# Patient Record
Sex: MALE | Race: WHITE | Employment: OTHER | URBAN - METROPOLITAN AREA
[De-identification: names, ages, dates, MRNs, and addresses within clinical notes are randomized per-mention and may not be internally consistent; named-entity substitution may affect disease eponyms.]

---

## 2017-01-11 ENCOUNTER — CARDPULM VISIT (OUTPATIENT)
Dept: CARDIAC REHAB | Facility: HOSPITAL | Age: 77
End: 2017-01-11
Attending: INTERNAL MEDICINE
Payer: MEDICARE

## 2017-01-13 ENCOUNTER — CARDPULM VISIT (OUTPATIENT)
Dept: CARDIAC REHAB | Facility: HOSPITAL | Age: 77
End: 2017-01-13
Attending: INTERNAL MEDICINE
Payer: MEDICARE

## 2017-01-16 ENCOUNTER — CARDPULM VISIT (OUTPATIENT)
Dept: CARDIAC REHAB | Facility: HOSPITAL | Age: 77
End: 2017-01-16
Attending: INTERNAL MEDICINE
Payer: MEDICARE

## 2017-01-17 ENCOUNTER — TELEPHONE (OUTPATIENT)
Dept: FAMILY MEDICINE CLINIC | Facility: CLINIC | Age: 77
End: 2017-01-17

## 2017-01-17 DIAGNOSIS — I10 ESSENTIAL HYPERTENSION WITH GOAL BLOOD PRESSURE LESS THAN 130/85: ICD-10-CM

## 2017-01-17 DIAGNOSIS — E11.8 CONTROLLED TYPE 2 DIABETES MELLITUS WITH COMPLICATION, WITHOUT LONG-TERM CURRENT USE OF INSULIN (HCC): Primary | ICD-10-CM

## 2017-01-17 RX ORDER — GABAPENTIN 300 MG/1
CAPSULE ORAL
Qty: 360 CAPSULE | Refills: 0 | Status: SHIPPED | OUTPATIENT
Start: 2017-01-17 | End: 2017-02-27

## 2017-01-17 RX ORDER — POTASSIUM CHLORIDE 20 MEQ/1
TABLET, EXTENDED RELEASE ORAL
Qty: 90 TABLET | Refills: 0 | Status: ON HOLD | OUTPATIENT
Start: 2017-01-17 | End: 2017-04-28

## 2017-01-17 NOTE — TELEPHONE ENCOUNTER
Pt needs refill of gabapentin 300mg, Potassium Chloride ER 20 MEQ Oral Tab CR, SITagliptin Phosphate (JANUVIA) 100 MG Oral. Sent to his [facility] 90 day supply pharmacy fax [phone].

## 2017-01-18 ENCOUNTER — CARDPULM VISIT (OUTPATIENT)
Dept: CARDIAC REHAB | Facility: HOSPITAL | Age: 77
End: 2017-01-18
Attending: INTERNAL MEDICINE
Payer: MEDICARE

## 2017-01-20 ENCOUNTER — CARDPULM VISIT (OUTPATIENT)
Dept: CARDIAC REHAB | Facility: HOSPITAL | Age: 77
End: 2017-01-20
Attending: INTERNAL MEDICINE
Payer: MEDICARE

## 2017-01-23 ENCOUNTER — CARDPULM VISIT (OUTPATIENT)
Dept: CARDIAC REHAB | Facility: HOSPITAL | Age: 77
End: 2017-01-23
Attending: INTERNAL MEDICINE
Payer: MEDICARE

## 2017-01-23 ENCOUNTER — PRIOR ORIGINAL RECORDS (OUTPATIENT)
Dept: OTHER | Age: 77
End: 2017-01-23

## 2017-01-25 ENCOUNTER — CARDPULM VISIT (OUTPATIENT)
Dept: CARDIAC REHAB | Facility: HOSPITAL | Age: 77
End: 2017-01-25
Attending: INTERNAL MEDICINE
Payer: MEDICARE

## 2017-01-27 ENCOUNTER — CARDPULM VISIT (OUTPATIENT)
Dept: CARDIAC REHAB | Facility: HOSPITAL | Age: 77
End: 2017-01-27
Attending: INTERNAL MEDICINE
Payer: MEDICARE

## 2017-01-30 ENCOUNTER — CARDPULM VISIT (OUTPATIENT)
Dept: CARDIAC REHAB | Facility: HOSPITAL | Age: 77
End: 2017-01-30
Attending: INTERNAL MEDICINE
Payer: MEDICARE

## 2017-02-01 ENCOUNTER — CARDPULM VISIT (OUTPATIENT)
Dept: CARDIAC REHAB | Facility: HOSPITAL | Age: 77
End: 2017-02-01
Attending: INTERNAL MEDICINE
Payer: MEDICARE

## 2017-02-03 ENCOUNTER — CARDPULM VISIT (OUTPATIENT)
Dept: CARDIAC REHAB | Facility: HOSPITAL | Age: 77
End: 2017-02-03
Attending: INTERNAL MEDICINE
Payer: MEDICARE

## 2017-02-06 ENCOUNTER — CARDPULM VISIT (OUTPATIENT)
Dept: CARDIAC REHAB | Facility: HOSPITAL | Age: 77
End: 2017-02-06
Attending: INTERNAL MEDICINE
Payer: MEDICARE

## 2017-02-08 ENCOUNTER — CARDPULM VISIT (OUTPATIENT)
Dept: CARDIAC REHAB | Facility: HOSPITAL | Age: 77
End: 2017-02-08
Attending: INTERNAL MEDICINE
Payer: MEDICARE

## 2017-02-10 ENCOUNTER — CARDPULM VISIT (OUTPATIENT)
Dept: CARDIAC REHAB | Facility: HOSPITAL | Age: 77
End: 2017-02-10
Attending: INTERNAL MEDICINE
Payer: MEDICARE

## 2017-02-13 PROBLEM — H90.3 SENSORY HEARING LOSS, BILATERAL: Status: ACTIVE | Noted: 2017-02-13

## 2017-02-15 ENCOUNTER — CARDPULM VISIT (OUTPATIENT)
Dept: CARDIAC REHAB | Facility: HOSPITAL | Age: 77
End: 2017-02-15
Attending: INTERNAL MEDICINE
Payer: MEDICARE

## 2017-02-21 ENCOUNTER — HOSPITAL ENCOUNTER (OUTPATIENT)
Dept: GENERAL RADIOLOGY | Facility: HOSPITAL | Age: 77
Discharge: HOME | End: 2017-02-21
Attending: INTERNAL MEDICINE
Payer: MEDICARE

## 2017-02-21 ENCOUNTER — LAB ENCOUNTER (OUTPATIENT)
Dept: LAB | Facility: HOSPITAL | Age: 77
End: 2017-02-21
Attending: INTERNAL MEDICINE
Payer: MEDICARE

## 2017-02-21 ENCOUNTER — MYAURORA ACCOUNT LINK (OUTPATIENT)
Dept: OTHER | Age: 77
End: 2017-02-21

## 2017-02-21 ENCOUNTER — PRIOR ORIGINAL RECORDS (OUTPATIENT)
Dept: OTHER | Age: 77
End: 2017-02-21

## 2017-02-21 DIAGNOSIS — R06.00 DYSPNEA: ICD-10-CM

## 2017-02-21 DIAGNOSIS — R06.03 ACUTE RESPIRATORY DISTRESS: ICD-10-CM

## 2017-02-21 DIAGNOSIS — R60.9 EDEMA: Primary | ICD-10-CM

## 2017-02-21 DIAGNOSIS — R60.9 EDEMA: ICD-10-CM

## 2017-02-21 LAB
BASOPHILS # BLD AUTO: 0.06 X10(3) UL (ref 0–0.1)
BASOPHILS NFR BLD AUTO: 1.2 %
BUN BLD-MCNC: 25 MG/DL (ref 8–20)
CALCIUM BLD-MCNC: 9.8 MG/DL (ref 8.3–10.3)
CHLORIDE: 100 MMOL/L (ref 101–111)
CO2: 33 MMOL/L (ref 22–32)
CREAT BLD-MCNC: 1.25 MG/DL (ref 0.7–1.3)
EOSINOPHIL # BLD AUTO: 0.21 X10(3) UL (ref 0–0.3)
EOSINOPHIL NFR BLD AUTO: 4.2 %
ERYTHROCYTE [DISTWIDTH] IN BLOOD BY AUTOMATED COUNT: 15.9 % (ref 11.5–16)
GLUCOSE BLD-MCNC: 99 MG/DL (ref 70–99)
HCT VFR BLD AUTO: 40.3 % (ref 37–53)
HGB BLD-MCNC: 12.7 G/DL (ref 13–17)
IMMATURE GRANULOCYTE COUNT: 0.02 X10(3) UL (ref 0–1)
IMMATURE GRANULOCYTE RATIO %: 0.4 %
LYMPHOCYTES # BLD AUTO: 0.64 X10(3) UL (ref 0.9–4)
LYMPHOCYTES NFR BLD AUTO: 12.7 %
MCH RBC QN AUTO: 27.8 PG (ref 27–33.2)
MCHC RBC AUTO-ENTMCNC: 31.5 G/DL (ref 31–37)
MCV RBC AUTO: 88.2 FL (ref 80–99)
MONOCYTES # BLD AUTO: 0.47 X10(3) UL (ref 0.1–0.6)
MONOCYTES NFR BLD AUTO: 9.3 %
NEUTROPHIL ABS PRELIM: 3.64 X10 (3) UL (ref 1.3–6.7)
NEUTROPHILS # BLD AUTO: 3.64 X10(3) UL (ref 1.3–6.7)
NEUTROPHILS NFR BLD AUTO: 72.2 %
PLATELET # BLD AUTO: 152 10(3)UL (ref 150–450)
POTASSIUM SERPL-SCNC: 4.2 MMOL/L (ref 3.6–5.1)
RBC # BLD AUTO: 4.57 X10(6)UL (ref 3.8–5.8)
RED CELL DISTRIBUTION WIDTH-SD: 50.5 FL (ref 35.1–46.3)
SODIUM SERPL-SCNC: 140 MMOL/L (ref 136–144)
WBC # BLD AUTO: 5 X10(3) UL (ref 4–13)

## 2017-02-21 PROCEDURE — 36415 COLL VENOUS BLD VENIPUNCTURE: CPT

## 2017-02-21 PROCEDURE — 71035 XR CHEST DECUBITUS (CPT=71035): CPT

## 2017-02-21 PROCEDURE — 71020 XR CHEST PA + LAT CHEST (CPT=71020): CPT

## 2017-02-21 PROCEDURE — 80048 BASIC METABOLIC PNL TOTAL CA: CPT

## 2017-02-21 PROCEDURE — 85025 COMPLETE CBC W/AUTO DIFF WBC: CPT

## 2017-02-23 ENCOUNTER — APPOINTMENT (OUTPATIENT)
Dept: LAB | Age: 77
End: 2017-02-23
Attending: FAMILY MEDICINE
Payer: MEDICARE

## 2017-02-23 ENCOUNTER — NURSE ONLY (OUTPATIENT)
Dept: CARDIAC REHAB | Facility: HOSPITAL | Age: 77
End: 2017-02-23
Attending: FAMILY MEDICINE

## 2017-02-23 DIAGNOSIS — I10 ESSENTIAL HYPERTENSION WITH GOAL BLOOD PRESSURE LESS THAN 130/85: ICD-10-CM

## 2017-02-23 DIAGNOSIS — E11.42 DIABETIC POLYNEUROPATHY ASSOCIATED WITH TYPE 2 DIABETES MELLITUS (HCC): ICD-10-CM

## 2017-02-23 DIAGNOSIS — E11.8 CONTROLLED TYPE 2 DIABETES MELLITUS WITH COMPLICATION, WITHOUT LONG-TERM CURRENT USE OF INSULIN (HCC): ICD-10-CM

## 2017-02-23 DIAGNOSIS — E78.00 PURE HYPERCHOLESTEROLEMIA: ICD-10-CM

## 2017-02-23 LAB
ALBUMIN SERPL-MCNC: 3.2 G/DL (ref 3.5–4.8)
ALP LIVER SERPL-CCNC: 146 U/L (ref 45–117)
ALT SERPL-CCNC: 14 U/L (ref 17–63)
AST SERPL-CCNC: 14 U/L (ref 15–41)
BILIRUB SERPL-MCNC: 0.7 MG/DL (ref 0.1–2)
BUN BLD-MCNC: 37 MG/DL (ref 8–20)
CALCIUM BLD-MCNC: 9.3 MG/DL (ref 8.3–10.3)
CHLORIDE: 95 MMOL/L (ref 101–111)
CHOLEST SMN-MCNC: 102 MG/DL (ref ?–200)
CO2: 33 MMOL/L (ref 22–32)
CREAT BLD-MCNC: 1.58 MG/DL (ref 0.7–1.3)
CREAT UR-SCNC: 42.7 MG/DL
EST. AVERAGE GLUCOSE BLD GHB EST-MCNC: 157 MG/DL (ref 68–126)
GLUCOSE BLD-MCNC: 117 MG/DL (ref 70–99)
HBA1C MFR BLD HPLC: 7.1 % (ref ?–5.7)
HDLC SERPL-MCNC: 37 MG/DL (ref 45–?)
HDLC SERPL: 2.76 {RATIO} (ref ?–4.97)
LDLC SERPL CALC-MCNC: 50 MG/DL (ref ?–130)
M PROTEIN MFR SERPL ELPH: 8 G/DL (ref 6.1–8.3)
MICROALBUMIN UR-MCNC: 0.94 MG/DL
MICROALBUMIN/CREAT 24H UR-RTO: 22 UG/MG (ref ?–30)
NONHDLC SERPL-MCNC: 65 MG/DL (ref ?–130)
POTASSIUM SERPL-SCNC: 4.1 MMOL/L (ref 3.6–5.1)
SODIUM SERPL-SCNC: 135 MMOL/L (ref 136–144)
TRIGLYCERIDES: 75 MG/DL (ref ?–150)
VLDL: 15 MG/DL (ref 5–40)

## 2017-02-23 PROCEDURE — 80053 COMPREHEN METABOLIC PANEL: CPT

## 2017-02-23 PROCEDURE — 83036 HEMOGLOBIN GLYCOSYLATED A1C: CPT

## 2017-02-23 PROCEDURE — 36415 COLL VENOUS BLD VENIPUNCTURE: CPT

## 2017-02-23 PROCEDURE — 82570 ASSAY OF URINE CREATININE: CPT

## 2017-02-23 PROCEDURE — 80061 LIPID PANEL: CPT

## 2017-02-23 PROCEDURE — 82043 UR ALBUMIN QUANTITATIVE: CPT

## 2017-02-27 ENCOUNTER — OFFICE VISIT (OUTPATIENT)
Dept: FAMILY MEDICINE CLINIC | Facility: CLINIC | Age: 77
End: 2017-02-27

## 2017-02-27 VITALS
WEIGHT: 218 LBS | BODY MASS INDEX: 28.89 KG/M2 | DIASTOLIC BLOOD PRESSURE: 60 MMHG | RESPIRATION RATE: 16 BRPM | HEIGHT: 73 IN | HEART RATE: 60 BPM | SYSTOLIC BLOOD PRESSURE: 110 MMHG | TEMPERATURE: 97 F

## 2017-02-27 DIAGNOSIS — E11.42 DIABETIC POLYNEUROPATHY ASSOCIATED WITH TYPE 2 DIABETES MELLITUS (HCC): ICD-10-CM

## 2017-02-27 DIAGNOSIS — I73.9 PERIPHERAL VASCULAR DISEASE, UNSPECIFIED (HCC): ICD-10-CM

## 2017-02-27 DIAGNOSIS — Z95.2 S/P AVR (AORTIC VALVE REPLACEMENT): ICD-10-CM

## 2017-02-27 DIAGNOSIS — E11.8 CONTROLLED TYPE 2 DIABETES MELLITUS WITH COMPLICATION, WITHOUT LONG-TERM CURRENT USE OF INSULIN (HCC): ICD-10-CM

## 2017-02-27 DIAGNOSIS — Z00.00 ENCOUNTER FOR ANNUAL HEALTH EXAMINATION: Primary | ICD-10-CM

## 2017-02-27 DIAGNOSIS — H90.3 SENSORY HEARING LOSS, BILATERAL: ICD-10-CM

## 2017-02-27 DIAGNOSIS — J44.9 CHRONIC OBSTRUCTIVE PULMONARY DISEASE, UNSPECIFIED COPD TYPE (HCC): ICD-10-CM

## 2017-02-27 DIAGNOSIS — I27.20 MILD PULMONARY HYPERTENSION (HCC): ICD-10-CM

## 2017-02-27 DIAGNOSIS — E78.00 PURE HYPERCHOLESTEROLEMIA: ICD-10-CM

## 2017-02-27 DIAGNOSIS — Z99.89 OSA ON CPAP: ICD-10-CM

## 2017-02-27 DIAGNOSIS — G47.33 OSA ON CPAP: ICD-10-CM

## 2017-02-27 DIAGNOSIS — I10 ESSENTIAL HYPERTENSION WITH GOAL BLOOD PRESSURE LESS THAN 130/85: ICD-10-CM

## 2017-02-27 PROCEDURE — 99214 OFFICE O/P EST MOD 30 MIN: CPT | Performed by: FAMILY MEDICINE

## 2017-02-27 PROCEDURE — G0439 PPPS, SUBSEQ VISIT: HCPCS | Performed by: FAMILY MEDICINE

## 2017-02-27 RX ORDER — RAMIPRIL 10 MG/1
CAPSULE ORAL
Qty: 90 CAPSULE | Refills: 1 | Status: ON HOLD | OUTPATIENT
Start: 2017-02-27 | End: 2017-03-21

## 2017-02-27 RX ORDER — GABAPENTIN 300 MG/1
900 CAPSULE ORAL 2 TIMES DAILY
Qty: 540 CAPSULE | Refills: 1 | Status: ON HOLD | OUTPATIENT
Start: 2017-02-27 | End: 2017-03-21

## 2017-02-27 RX ORDER — GLIMEPIRIDE 2 MG/1
TABLET ORAL
Qty: 135 TABLET | Refills: 1 | Status: ON HOLD | OUTPATIENT
Start: 2017-02-27 | End: 2017-03-21

## 2017-02-27 RX ORDER — TORSEMIDE 20 MG/1
TABLET ORAL
Qty: 108 TABLET | Refills: 1 | Status: ON HOLD | OUTPATIENT
Start: 2017-02-27 | End: 2017-03-21

## 2017-02-27 RX ORDER — ATORVASTATIN CALCIUM 40 MG/1
TABLET, FILM COATED ORAL
Qty: 90 TABLET | Refills: 1 | Status: ON HOLD | OUTPATIENT
Start: 2017-02-27 | End: 2017-03-21

## 2017-02-27 NOTE — PROGRESS NOTES
HPI:   Terrance Wooten is a 69year old male who presents for a Medicare Subsequent Annual Wellness visit (Pt already had Initial Annual Wellness). Terrance Wooten is a 77year old male who presents for recheck of hyperlipidemia.  Patient reports rosy 3.2* 02/23/2017   TSH 5.18* 12/07/2015   CREATSERUM 1.58* 02/23/2017   * 02/23/2017        CBC  (most recent labs)     Lab Results  Component Value Date   WBC 5.0 02/21/2017   HGB 12.7* 02/21/2017   .0 02/21/2017        ALLERGIES:   He is all intestinal obstruction ([facility] Prostatitis, unspecified; Aortic stenosis; COPD (chronic obstructive pulmonary disease) ([facility] Type II or unspecified type diabetes mellitus without mention of complication, not stated as uncontrolled;  Unspecified essential hy 28.77 kg/m2  Estimated body mass index is 28.77 kg/(m^2) as calculated from the following:    Height as of this encounter: 73\". Weight as of this encounter: 218 lb.     Medicare Hearing Assessment  (Required for AWV/SWV)    recent hearing test      Visu blood pressure less than 130/85  -     Detailed, Mod Complex (39977)  -     metoprolol Tartrate 25 MG Oral Tab; TAKE 1 BY MOUTH TWICE DAILY  -     ramipril 10 MG Oral Cap; TAKE 1 BY MOUTH DAILY  -     torsemide 20 MG Oral Tab; 30mg on t/thurs, 20mg all oth 2 - No    Has your appetite been poor?: Yes    How does the patient maintain a good energy level?: Other    How would you describe your daily physical activity?: Light    How would you describe your current health state?: Fair    How do you maintain positi and document. Then, refresh your progress note to see your input here.   Cognitive Assessment     What day of the week is this?: Correct    What month is it?: Correct    What year is it?: Correct    Recall \"Ball\": Correct    Recall \"Flag\": Correct Activity if applicable    Pneumoccocal 13 (Prevnar)   Orders placed or performed in visit on 09/26/16  -PNEUMOCOCCAL VACC, 13 MONY IM         Pneumococcal 23 (Pneumovax) No orders found for this or any previous visit.      Hepatitis B No orders found for [facility]

## 2017-02-27 NOTE — PATIENT INSTRUCTIONS
Dougie Campbell's SCREENING SCHEDULE   Tests on this list are recommended by your physician but may not be covered, or covered at this frequency, by your insurer. Please check with your insurance carrier before scheduling to verify coverage.     Lurdes Spencer screening (once between ages 73-68)  No results found for this or any previous visit.  Limited to patients who meet one of the following criteria:   • Men who are 73-68 years old and have smoked more than 100 cigarettes in their lifetime   • Anyone with a f any previous visit.  Medium/high risk factors:   End-stage renal disease   Hemophiliacs who received Factor VIII or IX concentrates   Clients of institutions for the mentally retarded   Persons who live in the same house as a HepB virus carrier   Homosexual

## 2017-02-28 LAB
BUN: 25 MG/DL
CALCIUM: 9.8 MG/DL
CHLORIDE: 100 MEQ/L
CREATININE, SERUM: 1.25 MG/DL
GLUCOSE: 99 MG/DL
HEMATOCRIT: 40.3 %
HEMOGLOBIN: 12.7 G/DL
PLATELETS: 152 K/UL
POTASSIUM, SERUM: 4.2 MEQ/L
RED BLOOD COUNT: 4.57 X 10-6/U
SODIUM: 140 MEQ/L
WHITE BLOOD COUNT: 5 X 10-3/U

## 2017-03-01 PROBLEM — I27.20 MILD PULMONARY HYPERTENSION (HCC): Status: ACTIVE | Noted: 2017-03-01

## 2017-03-20 ENCOUNTER — APPOINTMENT (OUTPATIENT)
Dept: NUCLEAR MEDICINE | Facility: HOSPITAL | Age: 77
End: 2017-03-20
Attending: EMERGENCY MEDICINE
Payer: MEDICARE

## 2017-03-20 ENCOUNTER — APPOINTMENT (OUTPATIENT)
Dept: ULTRASOUND IMAGING | Facility: HOSPITAL | Age: 77
End: 2017-03-20
Attending: EMERGENCY MEDICINE
Payer: MEDICARE

## 2017-03-20 ENCOUNTER — APPOINTMENT (OUTPATIENT)
Dept: GENERAL RADIOLOGY | Facility: HOSPITAL | Age: 77
End: 2017-03-20
Payer: MEDICARE

## 2017-03-20 ENCOUNTER — OFFICE VISIT (OUTPATIENT)
Dept: FAMILY MEDICINE CLINIC | Facility: CLINIC | Age: 77
End: 2017-03-20

## 2017-03-20 ENCOUNTER — HOSPITAL ENCOUNTER (OUTPATIENT)
Facility: HOSPITAL | Age: 77
Setting detail: OBSERVATION
LOS: 1 days | Discharge: HOME | End: 2017-03-22
Attending: EMERGENCY MEDICINE | Admitting: HOSPITALIST
Payer: MEDICARE

## 2017-03-20 VITALS
OXYGEN SATURATION: 96 % | WEIGHT: 214 LBS | HEART RATE: 56 BPM | RESPIRATION RATE: 24 BRPM | TEMPERATURE: 97 F | BODY MASS INDEX: 28 KG/M2 | DIASTOLIC BLOOD PRESSURE: 60 MMHG | SYSTOLIC BLOOD PRESSURE: 118 MMHG

## 2017-03-20 DIAGNOSIS — R07.9 ACUTE CHEST PAIN: ICD-10-CM

## 2017-03-20 DIAGNOSIS — R06.00 DYSPNEA, UNSPECIFIED TYPE: ICD-10-CM

## 2017-03-20 DIAGNOSIS — R06.00 DYSPNEA, UNSPECIFIED TYPE: Primary | ICD-10-CM

## 2017-03-20 DIAGNOSIS — R07.9 CHEST PAIN, UNSPECIFIED TYPE: Primary | ICD-10-CM

## 2017-03-20 DIAGNOSIS — R05.9 COUGH: ICD-10-CM

## 2017-03-20 LAB
ALBUMIN SERPL-MCNC: 3.2 G/DL (ref 3.5–4.8)
ALP LIVER SERPL-CCNC: 142 U/L (ref 45–117)
ALT SERPL-CCNC: 17 U/L (ref 17–63)
AST SERPL-CCNC: 19 U/L (ref 15–41)
BASOPHILS # BLD AUTO: 0.07 X10(3) UL (ref 0–0.1)
BASOPHILS NFR BLD AUTO: 1.4 %
BILIRUB SERPL-MCNC: 0.5 MG/DL (ref 0.1–2)
BUN BLD-MCNC: 27 MG/DL (ref 8–20)
CALCIUM BLD-MCNC: 9.5 MG/DL (ref 8.3–10.3)
CHLORIDE: 101 MMOL/L (ref 101–111)
CO2: 31 MMOL/L (ref 22–32)
CREAT BLD-MCNC: 1.27 MG/DL (ref 0.7–1.3)
D-DIMER: 8.47 UG/ML FEU (ref 0–0.49)
EOSINOPHIL # BLD AUTO: 0.22 X10(3) UL (ref 0–0.3)
EOSINOPHIL NFR BLD AUTO: 4.4 %
ERYTHROCYTE [DISTWIDTH] IN BLOOD BY AUTOMATED COUNT: 16.6 % (ref 11.5–16)
GLUCOSE BLD-MCNC: 94 MG/DL (ref 70–99)
HCT VFR BLD AUTO: 40 % (ref 37–53)
HGB BLD-MCNC: 13 G/DL (ref 13–17)
IMMATURE GRANULOCYTE COUNT: 0.02 X10(3) UL (ref 0–1)
IMMATURE GRANULOCYTE RATIO %: 0.4 %
LYMPHOCYTES # BLD AUTO: 0.61 X10(3) UL (ref 0.9–4)
LYMPHOCYTES NFR BLD AUTO: 12.1 %
M PROTEIN MFR SERPL ELPH: 8.2 G/DL (ref 6.1–8.3)
MCH RBC QN AUTO: 28 PG (ref 27–33.2)
MCHC RBC AUTO-ENTMCNC: 32.5 G/DL (ref 31–37)
MCV RBC AUTO: 86.2 FL (ref 80–99)
MONOCYTES # BLD AUTO: 0.52 X10(3) UL (ref 0.1–0.6)
MONOCYTES NFR BLD AUTO: 10.3 %
NEUTROPHIL ABS PRELIM: 3.6 X10 (3) UL (ref 1.3–6.7)
NEUTROPHILS # BLD AUTO: 3.6 X10(3) UL (ref 1.3–6.7)
NEUTROPHILS NFR BLD AUTO: 71.4 %
PLATELET # BLD AUTO: 158 10(3)UL (ref 150–450)
POTASSIUM SERPL-SCNC: 4.5 MMOL/L (ref 3.6–5.1)
PRO-BETA NATRIURETIC PEPTIDE: 960 PG/ML (ref ?–450)
RBC # BLD AUTO: 4.64 X10(6)UL (ref 3.8–5.8)
RED CELL DISTRIBUTION WIDTH-SD: 51.8 FL (ref 35.1–46.3)
SODIUM SERPL-SCNC: 137 MMOL/L (ref 136–144)
TROPONIN: <0.046 NG/ML (ref ?–0.05)
WBC # BLD AUTO: 5 X10(3) UL (ref 4–13)

## 2017-03-20 PROCEDURE — 93970 EXTREMITY STUDY: CPT

## 2017-03-20 PROCEDURE — 93000 ELECTROCARDIOGRAM COMPLETE: CPT | Performed by: FAMILY MEDICINE

## 2017-03-20 PROCEDURE — 99214 OFFICE O/P EST MOD 30 MIN: CPT | Performed by: FAMILY MEDICINE

## 2017-03-20 PROCEDURE — 78582 LUNG VENTILAT&PERFUS IMAGING: CPT

## 2017-03-20 PROCEDURE — 99223 1ST HOSP IP/OBS HIGH 75: CPT | Performed by: HOSPITALIST

## 2017-03-20 PROCEDURE — 71010 XR CHEST AP PORTABLE  (CPT=71010): CPT

## 2017-03-20 RX ORDER — BUDESONIDE AND FORMOTEROL FUMARATE DIHYDRATE 160; 4.5 UG/1; UG/1
1 AEROSOL RESPIRATORY (INHALATION) 2 TIMES DAILY
Refills: 4 | Status: ON HOLD | COMMUNITY
Start: 2017-02-07 | End: 2017-12-12

## 2017-03-20 RX ORDER — ASPIRIN 81 MG/1
324 TABLET, CHEWABLE ORAL ONCE
Status: COMPLETED | OUTPATIENT
Start: 2017-03-20 | End: 2017-03-20

## 2017-03-20 RX ORDER — FUROSEMIDE 10 MG/ML
40 INJECTION INTRAMUSCULAR; INTRAVENOUS ONCE
Status: COMPLETED | OUTPATIENT
Start: 2017-03-20 | End: 2017-03-20

## 2017-03-20 RX ORDER — ONDANSETRON 2 MG/ML
4 INJECTION INTRAMUSCULAR; INTRAVENOUS EVERY 4 HOURS PRN
Status: DISCONTINUED | OUTPATIENT
Start: 2017-03-20 | End: 2017-03-22

## 2017-03-20 NOTE — CONSULTS
Cardiology Consult Note     PRIMARY CARDIOLOGIST: OPAL/JAROCHO      CONSULT FOR: WORSENING DYSPNEA, CHEST PAIN      HISTORY: 70 Y/O MALE WITH HX OF NONOBSTRUCTIVE CAD AS NOTED ON PRE AO REPLACEMENT CATH  IN 2015.  HAS HAD INCREASING SOB OVER PAST WEEKS AND H

## 2017-03-20 NOTE — PROGRESS NOTES
Esha Rasmussen is a 69year old male. HPI:   Mr. Murtaza Posey is a 67-year-old male who gives a history of approximately 10-14 days of increasing shortness of breath. Denies outright fever. He has had a cough.   He states he has been taking his medications Tiotropium Bromide Monohydrate (SPIRIVA RESPIMAT) 2.5 MCG/ACT Inhalation Aero Soln Inhale 2.5 mcg into the lungs daily. Disp:  Rfl:    aspirin 81 MG Oral Tab EC Take 1 tablet by mouth daily.  Disp: 30 tablet Rfl: 11   Fluticasone Propionate (FLONASE) 50 Smokeless Status: Never Used                        Alcohol Use: Yes                Comment: occ.         REVIEW OF SYSTEMS:   GENERAL HEALTH: feels well otherwise  SKIN: denies any unusual skin lesions or rashes  RESPIRATORY: denies shortness of breath wit

## 2017-03-20 NOTE — HISTORICAL OFFICE NOTE
Ne Foster  : 1940  ACCOUNT:  [id number]  [phone]  PCP: Dr. Kevin Dockery     TODAY'S DATE: 2017  DICTATED BY:  Drue Kawasaki Frommelt, APN]    CHIEF COMPLAINT: [Followup of Dyspnea, Followup of Hypertension and benign.]    HPI: [On  and advised to quit 1 ppd. CAFFEINE: >5 cups daily. ALCOHOL: drinks 1-2 per day and wine 1-2 per day. EXERCISE: cardiac rehab. DIET: no special diet and low carb. MARITAL STATUS: . EDUCATION: high school graduate. OCCUPATION: . up with in 3 months. ASSESSMENT:  1. Aortic stenosis  2. Dyspnea  3. Aneurysm, abdominal  4. Aortic insufficiency  5. COPD  6. DM, Type II, with PV disease  7. Edema, localized  8. Hypercholesteremia, pure  9. Hypertension, benign  10. Obesity  11.  Ple

## 2017-03-20 NOTE — ED INITIAL ASSESSMENT (HPI)
Chest pain since am, EKG changes at MD appt today.  Started having cough on Thurs night after pulmonary therapy

## 2017-03-20 NOTE — ED PROVIDER NOTES
Patient Seen in: [facility] Emergency Department    History   Patient presents with:  Chest Pain Angina (cardiovascular)    Stated Complaint: chest pain with ekg changes - dyspnea    HPI    Of chest pain.   This is a 43-year-old male who arrives here Sleep apnea            Past Surgical History    CHOLECYSTECTOMY  1980    HERNIA SURGERY  2006    Comment ventral hernia repair; complex ventral herniorrhaphy with composix mesh    ARTHROSCOPY OF JOINT UNLISTED Left 1994    Comment knee    OTHER SURGICAL HI Strip,  TEST BLOOD AS DIRECTED  Patient taking differently: TEST BLOOD AS DIRECTED once daily   Glucose Blood (ASCENSIA MICROFILL TEST) In Vitro Strip,  by Other route.  TESTS ONCE DAILY   Betamethasone Dipropionate 0.05 % Apply Externally Cream,  2 (two) t abdomen is soft nondistended nontender. There is no rebound. There is no guarding. EXT: There is good pulses bilaterally. There is no calf tenderness. There is no rash noted. There is no edema    NEURO: Alert and oriented x3.   Muscle strength and s The patient was placed on monitors, IV was started, blood was drawn. The EKG shows sinus bradycardia there is no acute ST elevations o. 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 There are some nonspecific ST changes noted. .  The rest of the

## 2017-03-21 ENCOUNTER — APPOINTMENT (OUTPATIENT)
Dept: CV DIAGNOSTICS | Facility: HOSPITAL | Age: 77
End: 2017-03-21
Attending: INTERNAL MEDICINE
Payer: MEDICARE

## 2017-03-21 ENCOUNTER — PRIOR ORIGINAL RECORDS (OUTPATIENT)
Dept: OTHER | Age: 77
End: 2017-03-21

## 2017-03-21 LAB
ATRIAL RATE: 47 BPM
ATRIAL RATE: 57 BPM
BUN BLD-MCNC: 27 MG/DL (ref 8–20)
CALCIUM BLD-MCNC: 9.2 MG/DL (ref 8.3–10.3)
CHLORIDE: 104 MMOL/L (ref 101–111)
CHOLEST SMN-MCNC: 92 MG/DL (ref ?–200)
CO2: 31 MMOL/L (ref 22–32)
CREAT BLD-MCNC: 1.08 MG/DL (ref 0.7–1.3)
GLUCOSE BLD-MCNC: 110 MG/DL (ref 65–99)
GLUCOSE BLD-MCNC: 129 MG/DL (ref 65–99)
GLUCOSE BLD-MCNC: 130 MG/DL (ref 65–99)
GLUCOSE BLD-MCNC: 130 MG/DL (ref 70–99)
GLUCOSE BLD-MCNC: 138 MG/DL (ref 65–99)
GLUCOSE BLD-MCNC: 141 MG/DL (ref 65–99)
HAV IGM SER QL: 2.5 MG/DL (ref 1.7–3)
HDLC SERPL-MCNC: 30 MG/DL (ref 45–?)
HDLC SERPL: 3.07 {RATIO} (ref ?–4.97)
LDLC SERPL CALC-MCNC: 42 MG/DL (ref ?–130)
NONHDLC SERPL-MCNC: 62 MG/DL (ref ?–130)
P AXIS: 32 DEGREES
P AXIS: 59 DEGREES
P-R INTERVAL: 232 MS
P-R INTERVAL: 256 MS
POTASSIUM SERPL-SCNC: 3.9 MMOL/L (ref 3.6–5.1)
Q-T INTERVAL: 462 MS
Q-T INTERVAL: 480 MS
QRS DURATION: 100 MS
QRS DURATION: 90 MS
QTC CALCULATION (BEZET): 424 MS
QTC CALCULATION (BEZET): 449 MS
R AXIS: -32 DEGREES
R AXIS: -41 DEGREES
RESPIRATORY PANEL NEG:: NEGATIVE
SODIUM SERPL-SCNC: 140 MMOL/L (ref 136–144)
T AXIS: -29 DEGREES
T AXIS: -32 DEGREES
TRIGLYCERIDES: 100 MG/DL (ref ?–150)
TROPONIN: <0.046 NG/ML (ref ?–0.05)
TROPONIN: <0.046 NG/ML (ref ?–0.05)
VENTRICULAR RATE: 47 BPM
VENTRICULAR RATE: 57 BPM
VLDL: 20 MG/DL (ref 5–40)

## 2017-03-21 PROCEDURE — 93306 TTE W/DOPPLER COMPLETE: CPT

## 2017-03-21 PROCEDURE — 93306 TTE W/DOPPLER COMPLETE: CPT | Performed by: INTERNAL MEDICINE

## 2017-03-21 PROCEDURE — 99225 SUBSEQUENT OBSERVATION CARE: CPT | Performed by: INTERNAL MEDICINE

## 2017-03-21 RX ORDER — BETAMETHASONE DIPROPIONATE 0.5 MG/G
CREAM TOPICAL 2 TIMES DAILY PRN
Status: DISCONTINUED | OUTPATIENT
Start: 2017-03-21 | End: 2017-03-22

## 2017-03-21 RX ORDER — ACETAMINOPHEN 325 MG/1
650 TABLET ORAL EVERY 6 HOURS PRN
Status: DISCONTINUED | OUTPATIENT
Start: 2017-03-21 | End: 2017-03-22

## 2017-03-21 RX ORDER — TORSEMIDE 20 MG/1
30 TABLET ORAL SEE ADMIN INSTRUCTIONS
Status: ON HOLD | COMMUNITY
End: 2017-03-22

## 2017-03-21 RX ORDER — DEXTROSE MONOHYDRATE 25 G/50ML
50 INJECTION, SOLUTION INTRAVENOUS
Status: DISCONTINUED | OUTPATIENT
Start: 2017-03-21 | End: 2017-03-22

## 2017-03-21 RX ORDER — GLIMEPIRIDE 2 MG/1
1 TABLET ORAL EVERY EVENING
COMMUNITY
End: 2017-10-10

## 2017-03-21 RX ORDER — TORSEMIDE 20 MG/1
20 TABLET ORAL SEE ADMIN INSTRUCTIONS
Status: ON HOLD | COMMUNITY
End: 2017-03-22

## 2017-03-21 RX ORDER — NITROGLYCERIN 0.4 MG/1
0.4 TABLET SUBLINGUAL EVERY 5 MIN PRN
Status: DISCONTINUED | OUTPATIENT
Start: 2017-03-21 | End: 2017-03-22

## 2017-03-21 RX ORDER — ATORVASTATIN CALCIUM 40 MG/1
40 TABLET, FILM COATED ORAL NIGHTLY
Status: ON HOLD | COMMUNITY
End: 2017-12-08

## 2017-03-21 RX ORDER — GABAPENTIN 300 MG/1
900 CAPSULE ORAL 2 TIMES DAILY
Status: DISCONTINUED | OUTPATIENT
Start: 2017-03-21 | End: 2017-03-22

## 2017-03-21 RX ORDER — TORSEMIDE 20 MG/1
20 TABLET ORAL DAILY
Status: DISCONTINUED | OUTPATIENT
Start: 2017-03-21 | End: 2017-03-21

## 2017-03-21 RX ORDER — IPRATROPIUM BROMIDE AND ALBUTEROL SULFATE 2.5; .5 MG/3ML; MG/3ML
3 SOLUTION RESPIRATORY (INHALATION)
Status: DISCONTINUED | OUTPATIENT
Start: 2017-03-21 | End: 2017-03-22

## 2017-03-21 RX ORDER — RAMIPRIL 10 MG/1
5 CAPSULE ORAL DAILY
Status: ON HOLD | COMMUNITY
End: 2017-07-18

## 2017-03-21 RX ORDER — ASPIRIN 325 MG
325 TABLET ORAL DAILY
Status: DISCONTINUED | OUTPATIENT
Start: 2017-03-21 | End: 2017-03-22

## 2017-03-21 RX ORDER — TORSEMIDE 20 MG/1
20 TABLET ORAL
Status: DISCONTINUED | OUTPATIENT
Start: 2017-03-22 | End: 2017-03-22

## 2017-03-21 RX ORDER — RAMIPRIL 5 MG/1
10 CAPSULE ORAL DAILY
Status: DISCONTINUED | OUTPATIENT
Start: 2017-03-21 | End: 2017-03-22

## 2017-03-21 RX ORDER — POTASSIUM CHLORIDE 20 MEQ/1
20 TABLET, EXTENDED RELEASE ORAL DAILY
COMMUNITY
End: 2017-06-14

## 2017-03-21 RX ORDER — DOXYCYCLINE HYCLATE 100 MG/1
100 CAPSULE ORAL EVERY 12 HOURS SCHEDULED
Status: DISCONTINUED | OUTPATIENT
Start: 2017-03-21 | End: 2017-03-22

## 2017-03-21 RX ORDER — TEMAZEPAM 15 MG/1
15 CAPSULE ORAL NIGHTLY PRN
Status: DISCONTINUED | OUTPATIENT
Start: 2017-03-21 | End: 2017-03-22

## 2017-03-21 RX ORDER — GLIMEPIRIDE 2 MG/1
2 TABLET ORAL 2 TIMES DAILY
COMMUNITY
End: 2017-11-16

## 2017-03-21 RX ORDER — ASPIRIN 81 MG/1
81 TABLET ORAL DAILY
Status: DISCONTINUED | OUTPATIENT
Start: 2017-03-21 | End: 2017-03-21

## 2017-03-21 RX ORDER — GABAPENTIN 300 MG/1
900 CAPSULE ORAL 2 TIMES DAILY
COMMUNITY
End: 2017-11-08

## 2017-03-21 RX ORDER — ATORVASTATIN CALCIUM 40 MG/1
40 TABLET, FILM COATED ORAL NIGHTLY
Status: DISCONTINUED | OUTPATIENT
Start: 2017-03-21 | End: 2017-03-22

## 2017-03-21 RX ORDER — ALBUTEROL SULFATE 90 UG/1
2 AEROSOL, METERED RESPIRATORY (INHALATION) EVERY 6 HOURS PRN
Status: DISCONTINUED | OUTPATIENT
Start: 2017-03-21 | End: 2017-03-22

## 2017-03-21 RX ORDER — TORSEMIDE 20 MG/1
30 TABLET ORAL
Status: DISCONTINUED | OUTPATIENT
Start: 2017-03-21 | End: 2017-03-22

## 2017-03-21 NOTE — RESPIRATORY THERAPY NOTE
SEBLE - Equipment Use Daily Summary:                  . Set Mode:                . Usage in hours:                . 90% Pressure (EPAP) level:                . 90% Insp. Pressure (IPAP): Farrah Dos Santos AHI:                .  Supplemental Oxygen:

## 2017-03-21 NOTE — CM/SW NOTE
Patient failed inpatient criteria. Second level of review completed and supports observation.  UR committee in agreement. Discussed with Dr. Karsten Salvador who approves observation status.  Observation SILVA letter given to the patient and order written.     Katja

## 2017-03-21 NOTE — CONSULTS
[address]    PATIENT'S NAME: Lilibeth Campbell   ATTENDING PHYSICIAN: DANYELLE Cruz [facility]: Shaila Abad M.D.    PATIENT ACCOUNT#:   [de-identified]    LOCATION:  [address] [place] [address]  MEDICAL RECORD #:   [id number]       DATE OF NABIL mg 1-1/2 two times a day, total of 30 mg twice a day. Lipitor 40 mg at bedtime, Symbicort, Januvia, Lyrica, Spiriva, aspirin, Flonase, glimepiride, metoprolol 25 twice a day, potassium 20 mEq a day. ALLERGIES:  Iodine; previous anaphylaxis.     SOCIAL H workup in light of his known history of nonobstructive CAD two years ago. Still waiting for blood work, but ischemic workup is in order in conjunction with his echo.      Dictated By Siri Marrero M.D.  d: 03/20/2017 17:47:59  t: 03/20/2017 23:36:55  J

## 2017-03-21 NOTE — PROGRESS NOTES
03/20/17 2203 03/20/17 2205 03/20/17 2207   Vital Signs   Pulse 54 52 53   Heart Rate Source Monitor Monitor Monitor   Resp 18 18 20   Respiratory Quality Normal Normal Normal   /63 mmHg 126/65 mmHg 131/59 mmHg   BP Location Right arm Right arm Ri

## 2017-03-21 NOTE — PROCEDURES
This test includes spirometry and flow volume loop done at the bedside during an inpatient hospitalization. Spirometry and  flow volume loop show evidence of moderate  obstruction. Spirometry  suggests  restriction.  Complete PFT with lung volume gilmer

## 2017-03-21 NOTE — H&P
[facility] HOSPITALIST  History and Physical     Melba Herrera Patient Status:  Inpatient    1940 MRN [id number]   [facility] 2NE-A Attending Ralph Rico MD   [facility] Day # 1 PCP Matilda Horvath MD     Chief Complaint: Patient pre Procedure: CARPAL TUNNEL RELEASE;  Surgeon: Radha Mcclure MD;  Location: [facility]    VALVE REPLACEMENT       Social History:  reports that he quit smoking about 11 years ago. His smoking use included Cigarettes.  He has a 100 pack-year smoking histo daily. Disp: 30 tablet Rfl: 11   metolazone 2.5 MG Oral Tab  Disp:  Rfl: 2   Glucose Blood (PARAS CONTOUR NEXT TEST) In Vitro Strip Use as directed.  Disp: 100 strip Rfl: 1   Albuterol Sulfate HFA (PROAIR HFA) 108 (90 BASE) MCG/ACT Inhalation Aero [facility]: Appropriate mood and affect.       Diagnostic Data:      Labs:  Recent Labs   Lab  03/20/17   1708   WBC  5.0   HGB  13.0   MCV  86.2   PLT  158.0       Recent Labs   Lab  03/20/17   1708   GLU  94   BUN  27*   CREATSERUM  1.27   CA  9.5   AL

## 2017-03-21 NOTE — PLAN OF CARE
Received pt from er w/ chief complaint of worsening sob and chest discomfort. Alert. Oriented. sbrady per tele. Hr 40-50's. Asymptomatic. No edema to ble. abd soft, distended. bs present. scds in place. Upon arrival to floor pt been pain, free. Denies sob.

## 2017-03-21 NOTE — PROGRESS NOTES
[facility] HOSPITAL[facility]  Progress Note     [facility] Patient Status:  Inpatient    1940 MRN [id number]   [facility] 2NE-A Attending Oneil Freedman MD   Hosp Day # 1 PCP Shania Turcios MD     Chief Complaint: chest pain    S: P Oral Daily   • aspirin  325 mg Oral Daily   • insulin aspart  1-5 Units Subcutaneous TID CC and HS   • [START ON 3/22/2017] torsemide  20 mg Oral Once per day on Sun Mon Wed Fri Sat   • torsemide  30 mg Oral Once per day on Tue Thu       ASSESSMENT / PLAN:

## 2017-03-21 NOTE — CM/SW NOTE
03/21/17 1200   CM/SW Referral Data   Referral Source Physician;Nurse   Reason for Referral Protocol order set   Specify order set CHF   Informant Patient;Spouse   Pertinent Medical Hx   Primary Care Physician Name Dr. Clark Mccullough   Date of Last C Trino Duarte, [address]

## 2017-03-21 NOTE — PROGRESS NOTES
[facility]  Cardiology Progress Note    Lilian Hernandez Patient Status:  Inpatient    1940 MRN [id number]   [facility] 2NE-A Attending Omar Dominguez MD   Hosp Day # 1 PCP Ubaldo Lino MD     Subjective:  Still with SOB, Assessment:  1. Dyspnea, chest pain - troponin negative x 3, EKG changes, Echo and stress test pending. Improvement with lasix. 2.  CAD, normal LVEF , diastolic dysfunction  3. History of  Bioprosthetic AVR  4. HTN  5. DM  6.  Hypercholesterolem

## 2017-03-21 NOTE — PLAN OF CARE
Nuclear stress test was not able to be performed due to VQ scan yesterday. Stress test will be scheduled for tomorrow morning.

## 2017-03-21 NOTE — CONSULTS
[facility]  Report of Consultation    Lamberto Villegas Patient Status:  Inpatient    1940 MRN [id number]   [facility] 2NE-A Attending Charles Douglas MD   Hosp Day # 1 PCP Edita Driscoll MD     Reason for Consultation:  Dy atherosclerosis of unspecified type of vessel, native or graft    • Visual impairment    • Renal disorder      hx of nephrolithiasis (hx of multiple UTI's)   • Pericarditis    • Aortic valve replaced    • Hypercholesterolemia    • Neuropathy (HCC)    • Ashly mg, Oral, Nightly  •  ramipril (ALTACE) cap 10 mg, 10 mg, Oral, Daily  •  Fluticasone Furoate-Vilanterol (BREO ELLIPTA) 200-25 MCG/INH inhaler 1 puff, 1 puff, Inhalation, Daily  •  bisacodyl (DULCOLAX) EC tab 5 mg, 5 mg, Oral, Daily  •  acetaminophen (TYLE S1S2, no murmur. Abdomen: soft, non-tender, non-distended, no masses, no guarding, no     rebound, positive BS. Extremity:  no edema, no cyanosis   Skin: No rashes or lesions.    Neurological: Alert, interactive, no focal deficit    Vital signs in last CHOB      Cultures:       Radiology:  Reviewed  Personally       Assessment:  1. Dyspnea and cough for 4 days and CP x1 day - possible bronchitis with COPD exac  2. Chest pain - troponin negative x 3, EKG changes, Echo and stress test pending.  No PE vy VQ

## 2017-03-22 ENCOUNTER — APPOINTMENT (OUTPATIENT)
Dept: CV DIAGNOSTICS | Facility: HOSPITAL | Age: 77
End: 2017-03-22
Attending: NURSE PRACTITIONER
Payer: MEDICARE

## 2017-03-22 VITALS
TEMPERATURE: 98 F | BODY MASS INDEX: 27.55 KG/M2 | OXYGEN SATURATION: 99 % | WEIGHT: 207.88 LBS | HEIGHT: 73 IN | RESPIRATION RATE: 18 BRPM | HEART RATE: 57 BPM | SYSTOLIC BLOOD PRESSURE: 129 MMHG | DIASTOLIC BLOOD PRESSURE: 65 MMHG

## 2017-03-22 LAB
GLUCOSE BLD-MCNC: 141 MG/DL (ref 65–99)
GLUCOSE BLD-MCNC: 142 MG/DL (ref 65–99)

## 2017-03-22 PROCEDURE — 78452 HT MUSCLE IMAGE SPECT MULT: CPT

## 2017-03-22 PROCEDURE — 99217 OBSERVATION CARE DISCHARGE: CPT | Performed by: HOSPITALIST

## 2017-03-22 PROCEDURE — 93018 CV STRESS TEST I&R ONLY: CPT | Performed by: NURSE PRACTITIONER

## 2017-03-22 PROCEDURE — 93017 CV STRESS TEST TRACING ONLY: CPT

## 2017-03-22 RX ORDER — IPRATROPIUM BROMIDE AND ALBUTEROL SULFATE 2.5; .5 MG/3ML; MG/3ML
3 SOLUTION RESPIRATORY (INHALATION)
Qty: 360 ML | Refills: 0 | Status: SHIPPED | OUTPATIENT
Start: 2017-03-22 | End: 2017-04-21

## 2017-03-22 RX ORDER — TORSEMIDE 20 MG/1
40 TABLET ORAL DAILY
Qty: 15 TABLET | Refills: 5 | Status: ON HOLD | OUTPATIENT
Start: 2017-03-22 | End: 2017-06-18

## 2017-03-22 RX ORDER — DOXYCYCLINE HYCLATE 100 MG/1
100 CAPSULE ORAL EVERY 12 HOURS SCHEDULED
Qty: 14 CAPSULE | Refills: 0 | Status: SHIPPED | OUTPATIENT
Start: 2017-03-22 | End: 2017-03-29

## 2017-03-22 RX ORDER — TORSEMIDE 20 MG/1
20 TABLET ORAL EVERY OTHER DAY
Qty: 15 TABLET | Refills: 5 | Status: SHIPPED | OUTPATIENT
Start: 2017-03-22 | End: 2017-03-30 | Stop reason: ALTCHOICE

## 2017-03-22 NOTE — PROGRESS NOTES
[facility]  Cardiology Progress Note    Olivia Linares Patient Status:  Observation    1940 MRN [id number]   [facility] 2NE-A Attending Prem Rosenthal MD   Hosp Day # 2 PCP Gee Degroot MD     Subjective:  Patient states normal.  6. Right atrium: The atrium was mildly to moderately dilated. 7. Tricuspid valve: There was mild-moderate regurgitation. 8. Pulmonary arteries: Systolic pressure was moderately increased, estimated     to be 44mm Hg.   9. Pericardium, extracardia statin    Young Duty, APN  3/22/2017  11:01 AM    Addendum: Nuclear stress test was negative for ischemia, EF 52%. 2:10 PM    Seen and examined. Notes reviewed. Discussed with Dr. Tia Boxer and nursing staff. Looks and feels better.  Exam still with de

## 2017-03-22 NOTE — CM/SW NOTE
Patient discussed in rounds with RN. Patient to have stress test today, if negative, will be ready for discharge home later. Patient declined [place] [id number] services, will return home with family at discharge.     [place], [facility]

## 2017-03-22 NOTE — PROGRESS NOTES
PRELIMINARY LEXISCAN NUCLEAR EKG NOTE  Pt denied cardiac symptoms, no ekg changes. Nuclear images pending.

## 2017-03-22 NOTE — PROGRESS NOTES
[facility]  Progress Note    Eula Christianson Patient Status:  Observation    1940 MRN [id number]   [facility] 2NE-A Attending Brielle Vasquez MD   Hosp Day # 2 PCP Joann Valdovinos MD     Assessment:  1.  Dyspnea and cough fo Continuous  Oximetry Probe Site Changed: No (pt does remove at times )  Pulse Ox Probe Location: Left hand  Blood pressure 129/65, pulse 57, temperature 98.2 °F (36.8 °C), temperature source Oral, resp.  rate 18, height 6' 1\" (1.854 m), weight 207 lb 14.3 Inhalation Daily   Albuterol Sulfate  (90 Base) MCG/ACT inhaler 2 puff 2 puff Inhalation Q6H PRN   gabapentin (NEURONTIN) cap 900 mg 900 mg Oral BID   metoprolol Tartrate (LOPRESSOR) tab 25 mg 25 mg Oral 2x Daily(Beta Blocker)   Atorvastatin Calcium

## 2017-03-22 NOTE — PLAN OF CARE
Assumed care of patient at 1100, a+ox 4, denies chest pain or sob at this time. Patient ambulated in halls without problems on ra and o2 sats above 95% during ambulation. All mds stated he could be discharged today.  All paperwork and instructions given to

## 2017-03-22 NOTE — RESPIRATORY THERAPY NOTE
SEBLE Equipment Usage Summary :            Set Mode :CPAP          Usage in Hours:1;05          90% Pressure (EPAP) : 10           90% Insp Pressure (IPAP);           AHI : 2          Supplemental Oxygen :      LPM

## 2017-03-23 NOTE — CM/SW NOTE
03/23/17 0800   Discharge disposition   Discharged to: Home or [address] [place] [address] services after discharge None   Patient Refuses Rehab Services Yes   Discharge transportation Private car   Patient discharged 3/22/17

## 2017-03-23 NOTE — DISCHARGE SUMMARY
[facility]  DISCHARGE SUMMARY     Mich Werner Patient Status:  Observation    1940 MRN [id number]   [facility] 2NE-A Attending No att. providers found   Hosp Day # 2 PCP Javi Schaeffer MD     Date of Admission: 3/20/20 a mild nonproductive cough which is his baseline.  He states that this morning he started to develop a mild mid chest discomfort.  He has difficulty describing the quality of the pain.  In the emergency room the patient had an EKG with nonspecific ST and T CHANGE how you take these medications       Instructions Prescription details    torsemide 20 MG Tabs   Last time this was given:  20 mg on 3/22/2017  7:43 AM   Commonly known as:  DEMADEX   What changed:    - how much to take  - when to take this 25 MG Tabs   Last time this was given:  25 mg on 3/21/2017  5:35 PM   Commonly known as:  LOPRESSOR        Take 25 mg by mouth 2 (two) times daily.     Refills:  0       Potassium Chloride ER 20 MEQ Tbcr   Commonly known as:  K-DUR M20        Take 20 mEq by weeks        Vital signs:  Temp:  [98.1 °F (36.7 °C)-98.2 °F (36.8 °C)] 98.2 °F (36.8 °C)  Pulse:  [52-76] 57  Resp:  [16-18] 18  BP: (112-135)/(60-65) 129/65 mmHg    Physical Exam:    General: No acute distress.    Respiratory: Clear to auscultation bilate

## 2017-03-30 ENCOUNTER — OFFICE VISIT (OUTPATIENT)
Dept: FAMILY MEDICINE CLINIC | Facility: CLINIC | Age: 77
End: 2017-03-30

## 2017-03-30 ENCOUNTER — HOSPITAL ENCOUNTER (OUTPATIENT)
Dept: GENERAL RADIOLOGY | Facility: HOSPITAL | Age: 77
Discharge: HOME | End: 2017-03-30
Attending: FAMILY MEDICINE
Payer: MEDICARE

## 2017-03-30 VITALS
WEIGHT: 208 LBS | BODY MASS INDEX: 27.57 KG/M2 | HEIGHT: 73 IN | DIASTOLIC BLOOD PRESSURE: 52 MMHG | OXYGEN SATURATION: 98 % | HEART RATE: 54 BPM | TEMPERATURE: 97 F | SYSTOLIC BLOOD PRESSURE: 112 MMHG | RESPIRATION RATE: 14 BRPM

## 2017-03-30 DIAGNOSIS — R31.9 HEMATURIA: ICD-10-CM

## 2017-03-30 DIAGNOSIS — I10 ESSENTIAL HYPERTENSION: Primary | ICD-10-CM

## 2017-03-30 DIAGNOSIS — I50.42 CHF (CONGESTIVE HEART FAILURE), NYHA CLASS II, CHRONIC, COMBINED (HCC): ICD-10-CM

## 2017-03-30 PROBLEM — I50.9 CHF (CONGESTIVE HEART FAILURE), NYHA CLASS II (HCC): Status: ACTIVE | Noted: 2017-03-30

## 2017-03-30 PROCEDURE — 81001 URINALYSIS AUTO W/SCOPE: CPT | Performed by: FAMILY MEDICINE

## 2017-03-30 PROCEDURE — 71020 XR CHEST PA + LAT CHEST (CPT=71020): CPT

## 2017-03-30 PROCEDURE — 81003 URINALYSIS AUTO W/O SCOPE: CPT | Performed by: FAMILY MEDICINE

## 2017-03-30 PROCEDURE — 99214 OFFICE O/P EST MOD 30 MIN: CPT | Performed by: FAMILY MEDICINE

## 2017-03-30 NOTE — PROGRESS NOTES
Estrada Grande is a 69year old male. HPI:   Patient presents today for a hospital follow up. Patient was hospitalized 3/20/17-3/22/17 for shortness of breath and chest pain. He has been doing albuterol neb treatments about 3 times per day.  He reports h Albuterol Sulfate HFA (PROAIR HFA) 108 (90 BASE) MCG/ACT Inhalation Aero Soln Inhale 2 puffs into the lungs every 6 (six) hours as needed for Wheezing or Shortness of Breath.  Disp: 1 Inhaler Rfl: 0   Tiotropium Bromide Monohydrate (SPIRIVA RESPIMAT) 2.5 adenopathy, thyroid normal to palpation  LUNGS: clear to auscultation no rales, rhonchi or wheezes  CARDIO: RRR without murmur  EXTREMITIES: no cyanosis, clubbing or edema  Musculoskeletal: No gross deficit  Neurological: nerves II through XII grossly [place]

## 2017-04-03 ENCOUNTER — HOSPITAL ENCOUNTER (OUTPATIENT)
Dept: CT IMAGING | Facility: HOSPITAL | Age: 77
Discharge: HOME | End: 2017-04-03
Attending: FAMILY MEDICINE
Payer: MEDICARE

## 2017-04-03 DIAGNOSIS — R31.9 HEMATURIA: ICD-10-CM

## 2017-04-03 PROCEDURE — 74176 CT ABD & PELVIS W/O CONTRAST: CPT

## 2017-04-26 ENCOUNTER — HOSPITAL ENCOUNTER (OUTPATIENT)
Dept: CT IMAGING | Facility: HOSPITAL | Age: 77
Discharge: HOME | End: 2017-04-26
Attending: INTERNAL MEDICINE
Payer: MEDICARE

## 2017-04-26 DIAGNOSIS — J90 PLEURISY WITH EFFUSION: ICD-10-CM

## 2017-04-26 DIAGNOSIS — R06.00 DYSPNEA: ICD-10-CM

## 2017-04-26 PROCEDURE — 71250 CT THORAX DX C-: CPT

## 2017-04-28 RX ORDER — POTASSIUM CHLORIDE 20 MEQ/1
TABLET, EXTENDED RELEASE ORAL
Qty: 90 TABLET | Refills: 0 | Status: ON HOLD | OUTPATIENT
Start: 2017-04-28 | End: 2017-07-18

## 2017-05-09 ENCOUNTER — PRIOR ORIGINAL RECORDS (OUTPATIENT)
Dept: OTHER | Age: 77
End: 2017-05-09

## 2017-06-09 ENCOUNTER — PRIOR ORIGINAL RECORDS (OUTPATIENT)
Dept: OTHER | Age: 77
End: 2017-06-09

## 2017-06-14 ENCOUNTER — PRIOR ORIGINAL RECORDS (OUTPATIENT)
Dept: OTHER | Age: 77
End: 2017-06-14

## 2017-06-14 RX ORDER — PREDNISONE 50 MG/1
50 TABLET ORAL EVERY 6 HOURS
Status: ON HOLD | COMMUNITY
End: 2017-06-18

## 2017-06-14 NOTE — HISTORICAL OFFICE NOTE
[facility]  : 1940  ACCOUNT:  [id number]  [phone]  PCP: Dr. Clark Mccullough    TODAY'S DATE: 2017  DICTATED BY:  Veronique Camejo M.D.]    CHIEF COMPLAINT: [Followup of Dyspnea, Followup of Hypercholesteremia, pure, Followup of Hyper limiting arthritis. NEURO: no localized deficits. HEM/LYMPH: easy bruising. ALL: no new food or environmental allergies.     PAST HISTORY: prostatitis, COPD, nephrolithiasis, neuropathy, SEBLE, pleural effusion 5/2015, cholecystectomy 1980, hernia surgery 200 rub. AUSC:  regular rhythm, normal S1, S2 without S3; 1/6 2/6 systolic ejection murmur, 1/6 2/6 aortic and 1/6 2/6 diastolic murmur. CAROTIDS: carotid pulses normal. ABD AORTA: difficult to assess abdominal aorta. FEM: femoral pulses intact.  PEDAL: difficu 30 mg twice daily on Tues and Thurs,  10/25/16 Symbicort             160-4.5M  as directed  03/08/16 Januvia               100MG     daily  03/08/16 Lyrica                50MG      one tablet nightly  07/22/15 Spiriva Respimat      2. 5MCG/A  as directed

## 2017-06-16 ENCOUNTER — HOSPITAL ENCOUNTER (OUTPATIENT)
Dept: CV DIAGNOSTICS | Facility: HOSPITAL | Age: 77
Discharge: HOME | DRG: 286 | End: 2017-06-16
Attending: INTERNAL MEDICINE
Payer: MEDICARE

## 2017-06-16 ENCOUNTER — HOSPITAL ENCOUNTER (INPATIENT)
Dept: INTERVENTIONAL RADIOLOGY/VASCULAR | Facility: HOSPITAL | Age: 77
LOS: 2 days | Discharge: HOME | DRG: 286 | End: 2017-06-18
Attending: INTERNAL MEDICINE | Admitting: INTERNAL MEDICINE
Payer: MEDICARE

## 2017-06-16 ENCOUNTER — HOSPITAL ENCOUNTER (OUTPATIENT)
Dept: INTERVENTIONAL RADIOLOGY/VASCULAR | Facility: HOSPITAL | Age: 77
Discharge: HOME | DRG: 286 | End: 2017-06-16
Attending: INTERNAL MEDICINE
Payer: MEDICARE

## 2017-06-16 DIAGNOSIS — I35.1 AORTIC VALVE INSUFFICIENCY: ICD-10-CM

## 2017-06-16 DIAGNOSIS — R06.00 DYSPNEA: ICD-10-CM

## 2017-06-16 PROCEDURE — 93005 ELECTROCARDIOGRAM TRACING: CPT

## 2017-06-16 PROCEDURE — 4A023N8 MEASUREMENT OF CARDIAC SAMPLING AND PRESSURE, BILATERAL, PERCUTANEOUS APPROACH: ICD-10-PCS | Performed by: INTERNAL MEDICINE

## 2017-06-16 PROCEDURE — 93320 DOPPLER ECHO COMPLETE: CPT | Performed by: INTERNAL MEDICINE

## 2017-06-16 PROCEDURE — 99153 MOD SED SAME PHYS/QHP EA: CPT

## 2017-06-16 PROCEDURE — 93010 ELECTROCARDIOGRAM REPORT: CPT | Performed by: INTERNAL MEDICINE

## 2017-06-16 PROCEDURE — 94640 AIRWAY INHALATION TREATMENT: CPT

## 2017-06-16 PROCEDURE — 94660 CPAP INITIATION&MGMT: CPT

## 2017-06-16 PROCEDURE — 93567 NJX CAR CTH SPRVLV AORTGRPHY: CPT

## 2017-06-16 PROCEDURE — 82962 GLUCOSE BLOOD TEST: CPT

## 2017-06-16 PROCEDURE — 93460 R&L HRT ART/VENTRICLE ANGIO: CPT

## 2017-06-16 PROCEDURE — B246ZZ4 ULTRASONOGRAPHY OF RIGHT AND LEFT HEART, TRANSESOPHAGEAL: ICD-10-PCS | Performed by: INTERNAL MEDICINE

## 2017-06-16 PROCEDURE — 93325 DOPPLER ECHO COLOR FLOW MAPG: CPT | Performed by: INTERNAL MEDICINE

## 2017-06-16 PROCEDURE — B310YZZ FLUOROSCOPY OF THORACIC AORTA USING OTHER CONTRAST: ICD-10-PCS | Performed by: INTERNAL MEDICINE

## 2017-06-16 PROCEDURE — B211YZZ FLUOROSCOPY OF MULTIPLE CORONARY ARTERIES USING OTHER CONTRAST: ICD-10-PCS | Performed by: INTERNAL MEDICINE

## 2017-06-16 PROCEDURE — B215YZZ FLUOROSCOPY OF LEFT HEART USING OTHER CONTRAST: ICD-10-PCS | Performed by: INTERNAL MEDICINE

## 2017-06-16 PROCEDURE — 85027 COMPLETE CBC AUTOMATED: CPT | Performed by: INTERNAL MEDICINE

## 2017-06-16 PROCEDURE — 80048 BASIC METABOLIC PNL TOTAL CA: CPT | Performed by: INTERNAL MEDICINE

## 2017-06-16 PROCEDURE — 93312 ECHO TRANSESOPHAGEAL: CPT

## 2017-06-16 PROCEDURE — 99152 MOD SED SAME PHYS/QHP 5/>YRS: CPT

## 2017-06-16 RX ORDER — SODIUM CHLORIDE 9 MG/ML
INJECTION, SOLUTION INTRAVENOUS CONTINUOUS
Status: DISCONTINUED | OUTPATIENT
Start: 2017-06-16 | End: 2017-06-18

## 2017-06-16 RX ORDER — POTASSIUM CHLORIDE 20 MEQ/1
20 TABLET, EXTENDED RELEASE ORAL
Status: DISCONTINUED | OUTPATIENT
Start: 2017-06-16 | End: 2017-06-18

## 2017-06-16 RX ORDER — ALBUTEROL SULFATE 90 UG/1
2 AEROSOL, METERED RESPIRATORY (INHALATION) EVERY 6 HOURS PRN
Status: DISCONTINUED | OUTPATIENT
Start: 2017-06-16 | End: 2017-06-18

## 2017-06-16 RX ORDER — GABAPENTIN 300 MG/1
900 CAPSULE ORAL 2 TIMES DAILY
Status: DISCONTINUED | OUTPATIENT
Start: 2017-06-16 | End: 2017-06-18

## 2017-06-16 RX ORDER — SODIUM CHLORIDE 9 MG/ML
INJECTION, SOLUTION INTRAVENOUS CONTINUOUS
Status: ACTIVE | OUTPATIENT
Start: 2017-06-16 | End: 2017-06-16

## 2017-06-16 RX ORDER — DIPHENHYDRAMINE HYDROCHLORIDE 50 MG/ML
INJECTION INTRAMUSCULAR; INTRAVENOUS
Status: DISCONTINUED
Start: 2017-06-16 | End: 2017-06-16 | Stop reason: WASHOUT

## 2017-06-16 RX ORDER — ASPIRIN 81 MG/1
81 TABLET ORAL DAILY
Status: DISCONTINUED | OUTPATIENT
Start: 2017-06-17 | End: 2017-06-18

## 2017-06-16 RX ORDER — HEPARIN SODIUM 5000 [USP'U]/ML
INJECTION, SOLUTION INTRAVENOUS; SUBCUTANEOUS
Status: COMPLETED
Start: 2017-06-16 | End: 2017-06-16

## 2017-06-16 RX ORDER — MIDAZOLAM HYDROCHLORIDE 1 MG/ML
1 INJECTION INTRAMUSCULAR; INTRAVENOUS ONCE AS NEEDED
Status: COMPLETED | OUTPATIENT
Start: 2017-06-16 | End: 2017-06-16

## 2017-06-16 RX ORDER — MIDAZOLAM HYDROCHLORIDE 1 MG/ML
INJECTION INTRAMUSCULAR; INTRAVENOUS
Status: COMPLETED
Start: 2017-06-16 | End: 2017-06-16

## 2017-06-16 RX ORDER — MIDAZOLAM HYDROCHLORIDE 1 MG/ML
INJECTION INTRAMUSCULAR; INTRAVENOUS
Status: DISCONTINUED
Start: 2017-06-16 | End: 2017-06-16 | Stop reason: WASHOUT

## 2017-06-16 RX ORDER — RAMIPRIL 5 MG/1
10 CAPSULE ORAL DAILY
Status: DISCONTINUED | OUTPATIENT
Start: 2017-06-17 | End: 2017-06-18

## 2017-06-16 RX ORDER — TORSEMIDE 20 MG/1
40 TABLET ORAL DAILY
Status: DISCONTINUED | OUTPATIENT
Start: 2017-06-16 | End: 2017-06-17

## 2017-06-16 RX ORDER — ATORVASTATIN CALCIUM 40 MG/1
40 TABLET, FILM COATED ORAL NIGHTLY
Status: DISCONTINUED | OUTPATIENT
Start: 2017-06-16 | End: 2017-06-18

## 2017-06-16 RX ORDER — LIDOCAINE HYDROCHLORIDE 10 MG/ML
INJECTION, SOLUTION INFILTRATION; PERINEURAL
Status: COMPLETED
Start: 2017-06-16 | End: 2017-06-16

## 2017-06-16 RX ADMIN — SODIUM CHLORIDE: 9 INJECTION, SOLUTION INTRAVENOUS at 10:00:00

## 2017-06-16 RX ADMIN — Medication 25 MG: at 17:26:00

## 2017-06-16 RX ADMIN — ATORVASTATIN CALCIUM 40 MG: 40 TABLET, FILM COATED ORAL at 19:35:00

## 2017-06-16 RX ADMIN — MIDAZOLAM HYDROCHLORIDE 1 MG: 1 INJECTION INTRAMUSCULAR; INTRAVENOUS at 11:52:00

## 2017-06-16 RX ADMIN — SODIUM CHLORIDE: 9 INJECTION, SOLUTION INTRAVENOUS at 14:15:00

## 2017-06-16 RX ADMIN — MIDAZOLAM HYDROCHLORIDE 2 MG: 1 INJECTION INTRAMUSCULAR; INTRAVENOUS at 11:51:00

## 2017-06-16 RX ADMIN — GABAPENTIN 900 MG: 300 CAPSULE ORAL at 19:35:00

## 2017-06-16 NOTE — PROGRESS NOTES
Pt transferred to unit via bed; only IVF running. Right femoral site verified with CCL transporting RNs. Pedal pulse site is moderate, 2+. Telemetry monitor applied. VSS.   Right groin site is clean, dry, intact (sheaths were already pulled in CCL; righ

## 2017-06-16 NOTE — PROGRESS NOTES
[facility] CARDIOLOGY  CRISTOBAL Note    Estrada Grande Location:      N [id number]   Admission Date 6/16/2017 Operation Date 6/16/2017   Attending Physician Larissa Stallworth MD Operating Physician Paige Wharton MD     Pre-Operative Diagnosis: Dyspnea, prosthe

## 2017-06-16 NOTE — PLAN OF CARE
CARDIOVASCULAR - ADULT    • Maintains optimal cardiac output and hemodynamic stability Not Progressing    • Absence of cardiac arrhythmias or at baseline Not Progressing        RECEIVED IN BED. PT A&OX4.  VSS, R GROIN SITE D/I/C, NO OOZING NO HEMATOMA NOTED

## 2017-06-16 NOTE — PROGRESS NOTES
Prelim cath    Normal LV function    Equal and elevated filling pressures c/w constriction    Mild AI with no significant valvular gradient. Non-obstructive CAD    Needs Cardiac MRI and surgical consultation.

## 2017-06-16 NOTE — PROGRESS NOTES
Care transferred to Selwyn Serar, CARISA; endorsed to oncoming RN that admission navigator was not completed. Right groin site without s/s complications when care transferred.

## 2017-06-17 ENCOUNTER — APPOINTMENT (OUTPATIENT)
Dept: GENERAL RADIOLOGY | Facility: HOSPITAL | Age: 77
DRG: 286 | End: 2017-06-17
Attending: INTERNAL MEDICINE
Payer: MEDICARE

## 2017-06-17 PROCEDURE — 71020 XR CHEST PA + LAT CHEST (CPT=71020): CPT | Performed by: INTERNAL MEDICINE

## 2017-06-17 PROCEDURE — 93010 ELECTROCARDIOGRAM REPORT: CPT | Performed by: INTERNAL MEDICINE

## 2017-06-17 PROCEDURE — 82962 GLUCOSE BLOOD TEST: CPT

## 2017-06-17 PROCEDURE — 93005 ELECTROCARDIOGRAM TRACING: CPT

## 2017-06-17 RX ORDER — FUROSEMIDE 10 MG/ML
40 INJECTION INTRAMUSCULAR; INTRAVENOUS ONCE
Status: COMPLETED | OUTPATIENT
Start: 2017-06-17 | End: 2017-06-17

## 2017-06-17 RX ADMIN — ATORVASTATIN CALCIUM 40 MG: 40 TABLET, FILM COATED ORAL at 20:13:00

## 2017-06-17 RX ADMIN — FUROSEMIDE 40 MG: 10 INJECTION INTRAMUSCULAR; INTRAVENOUS at 13:00:00

## 2017-06-17 RX ADMIN — RAMIPRIL 10 MG: 5 CAPSULE ORAL at 09:33:00

## 2017-06-17 RX ADMIN — GABAPENTIN 900 MG: 300 CAPSULE ORAL at 20:13:00

## 2017-06-17 RX ADMIN — GABAPENTIN 900 MG: 300 CAPSULE ORAL at 09:33:00

## 2017-06-17 RX ADMIN — Medication 25 MG: at 09:33:00

## 2017-06-17 RX ADMIN — TORSEMIDE 40 MG: 20 TABLET ORAL at 09:33:00

## 2017-06-17 RX ADMIN — POTASSIUM CHLORIDE 20 MEQ: 20 TABLET, EXTENDED RELEASE ORAL at 09:33:00

## 2017-06-17 RX ADMIN — ASPIRIN 81 MG: 81 TABLET ORAL at 09:33:00

## 2017-06-17 NOTE — RESPIRATORY THERAPY NOTE
SEBLE - Equipment Use Daily Summary:                  . Set Mode: AUTO CPAP WITH C-FLEX                . Usage in hours: 5:54                . 90% Pressure (EPAP) level: 15.9                . 90% Insp. Pressure (IPAP): Jaden Lombardo  AHI: 0                .

## 2017-06-17 NOTE — PROGRESS NOTES
Reviewed in detail with Dr. Gian Serrano and again today with patient and his family. Looks good this am yet continues to experience profound dyspnea with minimal to no exertion. No LE edema but feels abdomen is bloated.  Central pressures high on cath as documen

## 2017-06-17 NOTE — PLAN OF CARE
CARDIOVASCULAR - ADULT    • Maintains optimal cardiac output and hemodynamic stability Progressing    • Absence of cardiac arrhythmias or at baseline Progressing        Tele monitoring. I/o. Cardiac MRI.  Possible discharge in am.

## 2017-06-17 NOTE — CM/SW NOTE
06/17/17 1500   CM/SW Referral Data   Referral Source Social Work (self-referral)   Reason for Referral Discharge planning;Psychoscial assessment   Informant Patient   Pertinent Medical Hx   Primary Care Physician Name Tripp Verdin MD   Patient I

## 2017-06-17 NOTE — PROGRESS NOTES
Pt seen by Dr. Bea Morgan 6/16, case dicussed with Dr. Nader Flood. Right heart cath c/w constriction. Recommend consult to [facility] for possible pericardectomy. Further recommendations per cardiology.

## 2017-06-17 NOTE — PLAN OF CARE
CARDIOVASCULAR - ADULT    • Maintains optimal cardiac output and hemodynamic stability Progressing    • Absence of cardiac arrhythmias or at baseline Progressing          Denies c/o pain, malaise, or cardiac symptoms. Remains in SB-NSR, NL S1, S2, RRR.   A

## 2017-06-18 ENCOUNTER — PRIOR ORIGINAL RECORDS (OUTPATIENT)
Dept: OTHER | Age: 77
End: 2017-06-18

## 2017-06-18 ENCOUNTER — APPOINTMENT (OUTPATIENT)
Dept: MRI IMAGING | Facility: HOSPITAL | Age: 77
DRG: 286 | End: 2017-06-18
Attending: INTERNAL MEDICINE
Payer: MEDICARE

## 2017-06-18 VITALS
SYSTOLIC BLOOD PRESSURE: 110 MMHG | OXYGEN SATURATION: 92 % | HEIGHT: 73 IN | RESPIRATION RATE: 20 BRPM | HEART RATE: 51 BPM | BODY MASS INDEX: 26.33 KG/M2 | WEIGHT: 198.63 LBS | TEMPERATURE: 98 F | DIASTOLIC BLOOD PRESSURE: 39 MMHG

## 2017-06-18 PROCEDURE — 82962 GLUCOSE BLOOD TEST: CPT

## 2017-06-18 PROCEDURE — 75561 CARDIAC MRI FOR MORPH W/DYE: CPT | Performed by: INTERNAL MEDICINE

## 2017-06-18 PROCEDURE — A9575 INJ GADOTERATE MEGLUMI 0.1ML: HCPCS | Performed by: INTERNAL MEDICINE

## 2017-06-18 PROCEDURE — 84443 ASSAY THYROID STIM HORMONE: CPT | Performed by: INTERNAL MEDICINE

## 2017-06-18 PROCEDURE — 80048 BASIC METABOLIC PNL TOTAL CA: CPT | Performed by: INTERNAL MEDICINE

## 2017-06-18 RX ORDER — TORSEMIDE 20 MG/1
40 TABLET ORAL 2 TIMES DAILY
Qty: 6 TABLET | Refills: 0 | Status: SHIPPED | OUTPATIENT
Start: 2017-06-19 | End: 2017-06-21

## 2017-06-18 RX ORDER — TORSEMIDE 20 MG/1
40 TABLET ORAL DAILY
Qty: 30 TABLET | Refills: 11 | Status: ON HOLD | OUTPATIENT
Start: 2017-06-22 | End: 2017-07-18

## 2017-06-18 RX ORDER — POTASSIUM CHLORIDE 20 MEQ/1
40 TABLET, EXTENDED RELEASE ORAL ONCE
Status: COMPLETED | OUTPATIENT
Start: 2017-06-18 | End: 2017-06-18

## 2017-06-18 RX ADMIN — POTASSIUM CHLORIDE 40 MEQ: 20 TABLET, EXTENDED RELEASE ORAL at 09:26:00

## 2017-06-18 RX ADMIN — ASPIRIN 81 MG: 81 TABLET ORAL at 09:25:00

## 2017-06-18 RX ADMIN — RAMIPRIL 10 MG: 5 CAPSULE ORAL at 09:26:00

## 2017-06-18 RX ADMIN — GABAPENTIN 900 MG: 300 CAPSULE ORAL at 09:26:00

## 2017-06-18 NOTE — RESPIRATORY THERAPY NOTE
SEBLE - Equipment Use Daily Summary:                  . Set Mode: AUTO CPAP WITH C-FLEX                . Usage in hours: 6:13                . 90% Pressure (EPAP) level: 10.6                . 90% Insp. Pressure (IPAP): Julian Mondragon  AHI: 0.5

## 2017-06-18 NOTE — PLAN OF CARE
CARDIOVASCULAR - ADULT    • Maintains optimal cardiac output and hemodynamic stability Progressing    • Absence of cardiac arrhythmias or at baseline Progressing          Denies c/o pain, malaise, or cardiac symptoms.   Remains in SB-NSR on tele, NL S1, S2,

## 2017-06-18 NOTE — PROGRESS NOTES
· [facility] Cardiology Progress Note     Subjective:  Very tired after MRI.   Feels abdomen is softening with diuresis    Objective:  110/39   SB  I/O - 2180  BUN/cr 31/1.22  TSH 5.060       Cardiac:S1 S2 regular  Lungs: clear anterior  Abdomen:softer pe

## 2017-06-18 NOTE — PLAN OF CARE
CARDIOVASCULAR - ADULT    • Maintains optimal cardiac output and hemodynamic stability Progressing    • Absence of cardiac arrhythmias or at baseline Progressing          Assumed care for pt. At 1.  Pt. A&Ox4, sitting up in chair with visitors at bedside

## 2017-06-20 ENCOUNTER — PATIENT OUTREACH (OUTPATIENT)
Dept: CASE MANAGEMENT | Age: 77
End: 2017-06-20

## 2017-06-20 DIAGNOSIS — Z02.9 ENCOUNTERS FOR ADMINISTRATIVE PURPOSE: ICD-10-CM

## 2017-06-22 LAB
BUN: 31 MG/DL
CALCIUM: 9.2 MG/DL
CHLORIDE: 101 MEQ/L
CREATININE, SERUM: 1.22 MG/DL
GLUCOSE: 138 MG/DL
POTASSIUM, SERUM: 3.7 MEQ/L
SODIUM: 139 MEQ/L

## 2017-06-23 ENCOUNTER — HOSPITAL ENCOUNTER (OUTPATIENT)
Dept: GENERAL RADIOLOGY | Facility: HOSPITAL | Age: 77
Discharge: HOME | End: 2017-06-23
Attending: INTERNAL MEDICINE
Payer: MEDICARE

## 2017-06-23 DIAGNOSIS — J90 PLEURAL EFFUSION: ICD-10-CM

## 2017-06-23 PROCEDURE — 71020 XR CHEST PA + LAT CHEST (CPT=71020): CPT | Performed by: INTERNAL MEDICINE

## 2017-06-23 NOTE — PROCEDURES
[facility]    PATIENT'S NAME: Ricardo Bucio   ATTENDING PHYSICIAN: Geo Celaya M.D. OPERATING PHYSICIAN: Geo Celaya M.D.    PATIENT ACCOUNT#:   [de-identified]    LOCATION:  [address] [place] [address]  MEDICAL RECORD #:   [id number]       DATE OF NABIL right ventricular pressure tracings were performed. The diastolic curves were virtually superimposable with equal end-diastolic pressures approximately 18-20 mmHg. No significant gradient was present across the aortic valve.   The dual-lumen pigtail gilmer atherosclerosis with preserved LV systolic function. He has no significant gradient across his bioprosthetic aortic valve. He does demonstrate mild aortic insufficiency.     Right heart catheterization reveals pulmonary arterial hypertension, and the find

## 2017-06-24 DIAGNOSIS — E11.8 CONTROLLED TYPE 2 DIABETES MELLITUS WITH COMPLICATION, WITHOUT LONG-TERM CURRENT USE OF INSULIN (HCC): ICD-10-CM

## 2017-06-28 ENCOUNTER — TELEPHONE (OUTPATIENT)
Dept: FAMILY MEDICINE CLINIC | Facility: CLINIC | Age: 77
End: 2017-06-28

## 2017-06-28 LAB
ALBUMIN: 3.2 G/DL
ALKALINE PHOSPHATATE(ALK PHOS): 142 IU/L
BILIRUBIN TOTAL: 0.5 MG/DL
BUN: 27 MG/DL
BUN: 27 MG/DL
CALCIUM: 9.2 MG/DL
CALCIUM: 9.5 MG/DL
CHLORIDE: 101 MEQ/L
CHLORIDE: 104 MEQ/L
CHOLESTEROL, TOTAL: 92 MG/DL
CREATININE, SERUM: 1.08 MG/DL
CREATININE, SERUM: 1.27 MG/DL
GLUCOSE: 130 MG/DL
GLUCOSE: 94 MG/DL
HDL CHOLESTEROL: 30 MG/DL
HEMATOCRIT: 40 %
HEMOGLOBIN: 13 G/DL
LDL CHOLESTEROL: 42 MG/DL
MAGNESIUM: 2.5 MG/DL
PLATELETS: 158 K/UL
POTASSIUM, SERUM: 3.9 MEQ/L
POTASSIUM, SERUM: 4.5 MEQ/L
PROBNP: 960 PG/ML
PROTEIN, TOTAL: 8.2 G/DL
RED BLOOD COUNT: 4.64 X 10-6/U
SGOT (AST): 19 IU/L
SGPT (ALT): 17 IU/L
SODIUM: 137 MEQ/L
SODIUM: 140 MEQ/L
TRIGLYCERIDES: 100 MG/DL
WHITE BLOOD COUNT: 5 X 10-3/U

## 2017-06-28 NOTE — TELEPHONE ENCOUNTER
No HFU appts currently available with you between now and 7/2/17. Please advise-we will then try to reach pt-thanks!

## 2017-06-28 NOTE — PROGRESS NOTES
TE sent to PCP office advising NCM has not been able to reach pt for TCM and does not have HFU appt scheduled at this time.

## 2017-06-28 NOTE — TELEPHONE ENCOUNTER
Multiple attempts to reach pt with no return call. Pt does not have TCM/HFU appt scheduled at this time. TCM/HFU appt recommended by 7/2/17 as pt is a moderate risk for readmission. Please advise.     TRIAGE:  Please f/u with pt and try to get him to [facility]

## 2017-06-29 NOTE — DISCHARGE SUMMARY
Discharge Summary    Admit date: 6/16/2017    Discharge date: 6/18/2017  6:10 PM     Discharge Diagnoses:   · Progressive exertional dyspnea. · S/p bioprostheric AVR. 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   Thickened pericardiu

## 2017-06-30 ENCOUNTER — LAB ENCOUNTER (OUTPATIENT)
Dept: LAB | Age: 77
End: 2017-06-30
Attending: FAMILY MEDICINE
Payer: MEDICARE

## 2017-06-30 ENCOUNTER — OFFICE VISIT (OUTPATIENT)
Dept: FAMILY MEDICINE CLINIC | Facility: CLINIC | Age: 77
End: 2017-06-30

## 2017-06-30 VITALS
OXYGEN SATURATION: 94 % | WEIGHT: 194 LBS | TEMPERATURE: 98 F | SYSTOLIC BLOOD PRESSURE: 118 MMHG | RESPIRATION RATE: 44 BRPM | BODY MASS INDEX: 26 KG/M2 | HEART RATE: 80 BPM | DIASTOLIC BLOOD PRESSURE: 62 MMHG

## 2017-06-30 DIAGNOSIS — E78.00 PURE HYPERCHOLESTEROLEMIA: ICD-10-CM

## 2017-06-30 DIAGNOSIS — I10 ESSENTIAL HYPERTENSION: ICD-10-CM

## 2017-06-30 DIAGNOSIS — I31.8 RESTRICTIVE PERICARDITIS: ICD-10-CM

## 2017-06-30 DIAGNOSIS — E11.8 CONTROLLED TYPE 2 DIABETES MELLITUS WITH COMPLICATION, WITHOUT LONG-TERM CURRENT USE OF INSULIN (HCC): ICD-10-CM

## 2017-06-30 DIAGNOSIS — I31.8 RESTRICTIVE PERICARDITIS: Primary | ICD-10-CM

## 2017-06-30 LAB
ALBUMIN SERPL-MCNC: 3.4 G/DL (ref 3.5–4.8)
ALP LIVER SERPL-CCNC: 157 U/L (ref 45–117)
ALT SERPL-CCNC: 18 U/L (ref 17–63)
AST SERPL-CCNC: 15 U/L (ref 15–41)
BASOPHILS # BLD AUTO: 0.04 X10(3) UL (ref 0–0.1)
BASOPHILS NFR BLD AUTO: 0.4 %
BILIRUB SERPL-MCNC: 0.7 MG/DL (ref 0.1–2)
BUN BLD-MCNC: 18 MG/DL (ref 8–20)
CALCIUM BLD-MCNC: 9.9 MG/DL (ref 8.3–10.3)
CHLORIDE: 98 MMOL/L (ref 101–111)
CHOLEST SMN-MCNC: 136 MG/DL (ref ?–200)
CO2: 33 MMOL/L (ref 22–32)
CREAT BLD-MCNC: 1.31 MG/DL (ref 0.7–1.3)
CREAT UR-SCNC: 44.6 MG/DL
EOSINOPHIL # BLD AUTO: 0.09 X10(3) UL (ref 0–0.3)
EOSINOPHIL NFR BLD AUTO: 0.9 %
ERYTHROCYTE [DISTWIDTH] IN BLOOD BY AUTOMATED COUNT: 16.1 % (ref 11.5–16)
EST. AVERAGE GLUCOSE BLD GHB EST-MCNC: 140 MG/DL (ref 68–126)
GLUCOSE BLD-MCNC: 165 MG/DL (ref 70–99)
HBA1C MFR BLD HPLC: 6.5 % (ref ?–5.7)
HCT VFR BLD AUTO: 45.3 % (ref 37–53)
HDLC SERPL-MCNC: 44 MG/DL (ref 45–?)
HDLC SERPL: 3.09 {RATIO} (ref ?–4.97)
HGB BLD-MCNC: 14.5 G/DL (ref 13–17)
IMMATURE GRANULOCYTE COUNT: 0.03 X10(3) UL (ref 0–1)
IMMATURE GRANULOCYTE RATIO %: 0.3 %
LDLC SERPL CALC-MCNC: 75 MG/DL (ref ?–130)
LYMPHOCYTES # BLD AUTO: 0.5 X10(3) UL (ref 0.9–4)
LYMPHOCYTES NFR BLD AUTO: 5.3 %
M PROTEIN MFR SERPL ELPH: 8.5 G/DL (ref 6.1–8.3)
MCH RBC QN AUTO: 27.9 PG (ref 27–33.2)
MCHC RBC AUTO-ENTMCNC: 32 G/DL (ref 31–37)
MCV RBC AUTO: 87.3 FL (ref 80–99)
MICROALBUMIN UR-MCNC: 3.7 MG/DL
MICROALBUMIN/CREAT 24H UR-RTO: 83 UG/MG (ref ?–30)
MONOCYTES # BLD AUTO: 0.56 X10(3) UL (ref 0.1–0.6)
MONOCYTES NFR BLD AUTO: 5.9 %
NEUTROPHIL ABS PRELIM: 8.28 X10 (3) UL (ref 1.3–6.7)
NEUTROPHILS # BLD AUTO: 8.28 X10(3) UL (ref 1.3–6.7)
NEUTROPHILS NFR BLD AUTO: 87.2 %
NONHDLC SERPL-MCNC: 92 MG/DL (ref ?–130)
PLATELET # BLD AUTO: 171 10(3)UL (ref 150–450)
POTASSIUM SERPL-SCNC: 4.3 MMOL/L (ref 3.6–5.1)
RBC # BLD AUTO: 5.19 X10(6)UL (ref 3.8–5.8)
RED CELL DISTRIBUTION WIDTH-SD: 51.2 FL (ref 35.1–46.3)
SODIUM SERPL-SCNC: 139 MMOL/L (ref 136–144)
TRIGLYCERIDES: 84 MG/DL (ref ?–150)
VLDL: 17 MG/DL (ref 5–40)
WBC # BLD AUTO: 9.5 X10(3) UL (ref 4–13)

## 2017-06-30 PROCEDURE — 80053 COMPREHEN METABOLIC PANEL: CPT

## 2017-06-30 PROCEDURE — 83036 HEMOGLOBIN GLYCOSYLATED A1C: CPT

## 2017-06-30 PROCEDURE — 36415 COLL VENOUS BLD VENIPUNCTURE: CPT

## 2017-06-30 PROCEDURE — 80061 LIPID PANEL: CPT

## 2017-06-30 PROCEDURE — 99495 TRANSJ CARE MGMT MOD F2F 14D: CPT | Performed by: FAMILY MEDICINE

## 2017-06-30 PROCEDURE — 85025 COMPLETE CBC W/AUTO DIFF WBC: CPT

## 2017-06-30 PROCEDURE — 82570 ASSAY OF URINE CREATININE: CPT

## 2017-06-30 PROCEDURE — 82043 UR ALBUMIN QUANTITATIVE: CPT

## 2017-06-30 NOTE — PROGRESS NOTES
HPI:    Terrance Wooten is a 69year old male here today for hospital follow up.    He was discharged from Inpatient hospital, [facility] to Home   Admit Date: 6/16/17  Discharge Date: 6/18/17  Hospital Discharge Diagnosis:    Constrictive pericar catheterization showed elevated filling pressures compatible with constriction. 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 He had a good response to furosemide drip.   He is able to be dischar cholesterol; Hypercholesterolemia; Neuropathy ([facility] [address] Other and unspecified hyperlipidemia; Pericarditis; Prostatitis, unspecified; Renal disorder;  Shortness of breath; Sleep apnea; Type II or unspecified type diabetes mellitus without mention of complicati Patient Position: Sitting, Cuff Size: adult)   Pulse 80   Temp (!) 97.5 °F (36.4 °C)   Resp (!) 44   Wt 194 lb   SpO2 94%   BMI 25.60 kg/m²    GENERAL: well developed, well nourished, in no apparent distress  SKIN: no rashes, no suspicious lesions  HEENT: Reviewed: moderate  · Risk of Significant Complications, Morbidity, and/or Mortality: moderate    Overall Risk:   moderate    Patient seen within 14 days from date of discharge.      Johanna Rey MD, 6/30/2017

## 2017-06-30 NOTE — PROGRESS NOTES
Zora Springer is a 69year old male. HPI:   ***    Current Outpatient Prescriptions:  Set.fm CONTOUR NEXT TEST In Vitro Strip USE AS DIRECTED TO TEST BLOOD GLUCOSE ONCE DAILY.  Disp: 100 strip Rfl: 3   torsemide 20 MG Oral Tab Take 2 tablets (40 mg total unspecified type diabetes mellitus without mention of complication, not stated as uncontrolled    • Unspecified essential hypertension    • Unspecified intestinal obstruction (HCC)    • Visual impairment       Social History:  Smoking status: Former Smoker

## 2017-06-30 NOTE — PROGRESS NOTES
HPI:    Estrada Grande is a 69year old male here today for hospital follow up.    He was discharged from {Discharged from which location:7092} to {Discharged to which location:7093::\"Home\"}   Admit Date: ***  Discharge Date: [facility] Discharge D nightly. glimepiride 2 MG Oral Tab Take 2 mg by mouth daily with breakfast.   glimepiride 2 MG Oral Tab Take 1 mg by mouth every evening.   gabapentin 300 MG Oral Cap Take 900 mg by mouth 2 (two) times daily.    SYMBICORT 160-4.5 MCG/ACT Inhalation Aeroso smoking use included Cigarettes. He has a 100.00 pack-year smoking history. He has never used smokeless tobacco. He reports that he drinks alcohol. He reports that he does not use drugs.      ROS:   GENERAL: weight stable, energy stable, no sweating  SKIN: encounter.       Meds & Refills for this Visit:  No prescriptions requested or ordered in this encounter    Imaging & Consults:  None    ***     Transitional Care Management Certification:  I certify that the following are true:  Communication with the artur

## 2017-07-03 ENCOUNTER — TELEPHONE (OUTPATIENT)
Dept: FAMILY MEDICINE CLINIC | Facility: CLINIC | Age: 77
End: 2017-07-03

## 2017-07-03 DIAGNOSIS — R77.9 ELEVATED SERUM PROTEIN LEVEL: ICD-10-CM

## 2017-07-03 DIAGNOSIS — E11.8 CONTROLLED TYPE 2 DIABETES MELLITUS WITH COMPLICATION, WITHOUT LONG-TERM CURRENT USE OF INSULIN (HCC): Primary | ICD-10-CM

## 2017-07-05 ENCOUNTER — APPOINTMENT (OUTPATIENT)
Dept: LAB | Age: 77
DRG: 292 | End: 2017-07-05
Attending: FAMILY MEDICINE
Payer: MEDICARE

## 2017-07-05 DIAGNOSIS — R77.9 ELEVATED SERUM PROTEIN LEVEL: ICD-10-CM

## 2017-07-05 PROCEDURE — 86334 IMMUNOFIX E-PHORESIS SERUM: CPT

## 2017-07-05 PROCEDURE — 84165 PROTEIN E-PHORESIS SERUM: CPT

## 2017-07-05 PROCEDURE — 83883 ASSAY NEPHELOMETRY NOT SPEC: CPT

## 2017-07-05 PROCEDURE — 36415 COLL VENOUS BLD VENIPUNCTURE: CPT

## 2017-07-07 ENCOUNTER — TELEPHONE (OUTPATIENT)
Dept: FAMILY MEDICINE CLINIC | Facility: CLINIC | Age: 77
End: 2017-07-07

## 2017-07-07 ENCOUNTER — PRIOR ORIGINAL RECORDS (OUTPATIENT)
Dept: OTHER | Age: 77
End: 2017-07-07

## 2017-07-07 ENCOUNTER — HOSPITAL ENCOUNTER (INPATIENT)
Facility: HOSPITAL | Age: 77
LOS: 11 days | Discharge: ANOTHER HOSPITAL | DRG: 292 | End: 2017-07-18
Attending: INTERNAL MEDICINE | Admitting: INTERNAL MEDICINE
Payer: MEDICARE

## 2017-07-07 LAB
A/G RATIO: 1.03
ALBUMIN, SERUM: 4.06 G/DL (ref 3.5–4.8)
ALPHA-1 GLOBULIN: 0.33 G/DL (ref 0.1–0.3)
ALPHA-2 GLOBULIN: 1.09 G/DL (ref 0.6–1)
ATRIAL RATE: 72 BPM
BASOPHILS # BLD AUTO: 0.06 X10(3) UL (ref 0–0.1)
BASOPHILS NFR BLD AUTO: 0.9 %
BETA GLOBULIN: 0.91 G/DL (ref 0.7–1.2)
BUN BLD-MCNC: 21 MG/DL (ref 8–20)
CALCIUM BLD-MCNC: 9.7 MG/DL (ref 8.3–10.3)
CHLORIDE: 98 MMOL/L (ref 101–111)
CO2: 35 MMOL/L (ref 22–32)
CREAT BLD-MCNC: 1.17 MG/DL (ref 0.7–1.3)
EOSINOPHIL # BLD AUTO: 0.11 X10(3) UL (ref 0–0.3)
EOSINOPHIL NFR BLD AUTO: 1.7 %
ERYTHROCYTE [DISTWIDTH] IN BLOOD BY AUTOMATED COUNT: 15.4 % (ref 11.5–16)
EST. AVERAGE GLUCOSE BLD GHB EST-MCNC: 143 MG/DL (ref 68–126)
GAMMA GLOBULIN: 1.61 G/DL (ref 0.6–1.6)
GLUCOSE BLD-MCNC: 111 MG/DL (ref 65–99)
GLUCOSE BLD-MCNC: 161 MG/DL (ref 65–99)
GLUCOSE BLD-MCNC: 65 MG/DL (ref 65–99)
GLUCOSE BLD-MCNC: 71 MG/DL (ref 65–99)
GLUCOSE BLD-MCNC: 79 MG/DL (ref 70–99)
HBA1C MFR BLD HPLC: 6.6 % (ref ?–5.7)
HCT VFR BLD AUTO: 46.2 % (ref 37–53)
HGB BLD-MCNC: 15 G/DL (ref 13–17)
IMMATURE GRANULOCYTE COUNT: 0.03 X10(3) UL (ref 0–1)
IMMATURE GRANULOCYTE RATIO %: 0.5 %
KAPPA FREE LIGHT CHAIN: 4.19 MG/DL (ref 0.33–1.94)
KAPPA/LAMBDA FLC RATIO: 1.38 (ref 0.26–1.65)
LAMBDA FREE LIGHT CHAIN: 3.03 MG/DL (ref 0.57–2.63)
LYMPHOCYTES # BLD AUTO: 0.63 X10(3) UL (ref 0.9–4)
LYMPHOCYTES NFR BLD AUTO: 9.9 %
MCH RBC QN AUTO: 27.8 PG (ref 27–33.2)
MCHC RBC AUTO-ENTMCNC: 32.5 G/DL (ref 31–37)
MCV RBC AUTO: 85.6 FL (ref 80–99)
MONOCYTES # BLD AUTO: 0.62 X10(3) UL (ref 0.1–0.6)
MONOCYTES NFR BLD AUTO: 9.7 %
NEUTROPHIL ABS PRELIM: 4.93 X10 (3) UL (ref 1.3–6.7)
NEUTROPHILS # BLD AUTO: 4.93 X10(3) UL (ref 1.3–6.7)
NEUTROPHILS NFR BLD AUTO: 77.3 %
P AXIS: -87 DEGREES
P-R INTERVAL: 152 MS
PLATELET # BLD AUTO: 192 10(3)UL (ref 150–450)
POTASSIUM SERPL-SCNC: 3.9 MMOL/L (ref 3.6–5.1)
Q-T INTERVAL: 418 MS
QRS DURATION: 136 MS
QTC CALCULATION (BEZET): 457 MS
R AXIS: -52 DEGREES
RBC # BLD AUTO: 5.4 X10(6)UL (ref 3.8–5.8)
RED CELL DISTRIBUTION WIDTH-SD: 47.9 FL (ref 35.1–46.3)
SODIUM SERPL-SCNC: 138 MMOL/L (ref 136–144)
T AXIS: -22 DEGREES
TOTAL PROTEIN,SERUM: 8 G/DL (ref 6.1–8.3)
VENTRICULAR RATE: 72 BPM
WBC # BLD AUTO: 6.4 X10(3) UL (ref 4–13)

## 2017-07-07 PROCEDURE — 99223 1ST HOSP IP/OBS HIGH 75: CPT | Performed by: INTERNAL MEDICINE

## 2017-07-07 PROCEDURE — 82962 GLUCOSE BLOOD TEST: CPT

## 2017-07-07 PROCEDURE — 5A09357 ASSISTANCE WITH RESPIRATORY VENTILATION, LESS THAN 24 CONSECUTIVE HOURS, CONTINUOUS POSITIVE AIRWAY PRESSURE: ICD-10-PCS | Performed by: INTERNAL MEDICINE

## 2017-07-07 RX ORDER — RAMIPRIL 5 MG/1
5 CAPSULE ORAL DAILY
Status: DISCONTINUED | OUTPATIENT
Start: 2017-07-08 | End: 2017-07-10

## 2017-07-07 RX ORDER — GABAPENTIN 300 MG/1
900 CAPSULE ORAL 2 TIMES DAILY
Status: DISCONTINUED | OUTPATIENT
Start: 2017-07-07 | End: 2017-07-18

## 2017-07-07 RX ORDER — FUROSEMIDE 10 MG/ML
40 INJECTION INTRAMUSCULAR; INTRAVENOUS ONCE
Status: COMPLETED | OUTPATIENT
Start: 2017-07-07 | End: 2017-07-07

## 2017-07-07 RX ORDER — ATORVASTATIN CALCIUM 40 MG/1
40 TABLET, FILM COATED ORAL NIGHTLY
Status: DISCONTINUED | OUTPATIENT
Start: 2017-07-07 | End: 2017-07-18

## 2017-07-07 RX ORDER — GLIMEPIRIDE 2 MG/1
2 TABLET ORAL
Status: DISCONTINUED | OUTPATIENT
Start: 2017-07-08 | End: 2017-07-07

## 2017-07-07 RX ORDER — ALBUTEROL SULFATE 90 UG/1
2 AEROSOL, METERED RESPIRATORY (INHALATION) EVERY 6 HOURS PRN
Status: DISCONTINUED | OUTPATIENT
Start: 2017-07-07 | End: 2017-07-18

## 2017-07-07 RX ORDER — DEXTROSE MONOHYDRATE 25 G/50ML
50 INJECTION, SOLUTION INTRAVENOUS
Status: DISCONTINUED | OUTPATIENT
Start: 2017-07-07 | End: 2017-07-18

## 2017-07-07 RX ORDER — ASPIRIN 81 MG/1
81 TABLET ORAL DAILY
Status: DISCONTINUED | OUTPATIENT
Start: 2017-07-08 | End: 2017-07-18

## 2017-07-07 RX ORDER — GLIMEPIRIDE 1 MG/1
1 TABLET ORAL EVERY EVENING
Status: DISCONTINUED | OUTPATIENT
Start: 2017-07-07 | End: 2017-07-07

## 2017-07-07 NOTE — HISTORICAL OFFICE NOTE
Sunithacaden Lisbeth  : 1940  ACCOUNT:  [id number]  [phone]  PCP: Dr. Nicole Blizzard    TODAY'S DATE: 2017  DICTATED BY:  Charisma Faith M.D.]    CHIEF COMPLAINT: [Followup of Dyspnea, Followup of Hypercholesteremia, pure, Followup of Hyper limiting arthritis. NEURO: no localized deficits. HEM/LYMPH: easy bruising. ALL: no new food or environmental allergies.     PAST HISTORY: prostatitis, COPD, nephrolithiasis, neuropathy, SEBLE, pleural effusion 5/2015, cholecystectomy 1980, hernia surgery 200 rub. AUSC:  regular rhythm, normal S1, S2 without S3; 1/6 2/6 systolic ejection murmur, 1/6 2/6 aortic and 1/6 2/6 diastolic murmur. CAROTIDS: carotid pulses normal. ABD AORTA: difficult to assess abdominal aorta. FEM: femoral pulses intact.  PEDAL: difficu 30 mg twice daily on Tues and Thurs,  10/25/16 Symbicort             160-4.5M  as directed  03/08/16 Januvia               100MG     daily  03/08/16 Lyrica                50MG      one tablet nightly  07/22/15 Spiriva Respimat      2. 5MCG/A  as directed enteric-coated aspirin 81 mg daily; atorvastatin 40 mg daily; metoprolol was discontinued; gabapentin, Dulcolax, and glimepiride as at home; potassium 20 mEq daily; albuterol, Symbicort, linagliptin as at home.      Plan is to decrease the ramipril to 10 mg

## 2017-07-07 NOTE — TELEPHONE ENCOUNTER
Pt spoke with cardiologist DR Lillie Corrales. He will be directly admitted to [facility] today.

## 2017-07-07 NOTE — TELEPHONE ENCOUNTER
Pt referred to [facility] for testing and possible surgery they advised he could not get in till Sept 14 or 15 wife has concurrences as to this being so far out please advise [phone]

## 2017-07-07 NOTE — H&P
[facility]    Report of Consultation    Nguyen Courser Patient Status:  Inpatient    1940 MRN [id number]   [facility] 2NE-A Attending Yessica Nascimento MD   Hosp Day # 0 PCP Gabi Rice MD     Date of Admission:   aneurysm  1/30/2015: OTHER SURGICAL HISTORY      Comment: aortic valve replacement  4/1/2016: REVISE MEDIAN N/CARPAL TUNNEL SURG Left      Comment: Procedure: CARPAL TUNNEL RELEASE;  Surgeon:                Juan Hillman MD;  Location: [facility] °F (36.4 °C), temperature source Oral, resp. rate 20, height 185.4 cm (6' 1\"), weight 190 lb (86.2 kg), SpO2 95 %.   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 Needs surgery -- if unable to be seen at [facility] until 9/14 will reconsult Dr. Chantelle Shah.  If he cannot operate on him here  Then hopefully he can advise us of an alternative here in t

## 2017-07-08 LAB
BUN BLD-MCNC: 19 MG/DL (ref 8–20)
CALCIUM BLD-MCNC: 9.8 MG/DL (ref 8.3–10.3)
CHLORIDE: 95 MMOL/L (ref 101–111)
CO2: 38 MMOL/L (ref 22–32)
CREAT BLD-MCNC: 1.22 MG/DL (ref 0.7–1.3)
GLUCOSE BLD-MCNC: 104 MG/DL (ref 65–99)
GLUCOSE BLD-MCNC: 126 MG/DL (ref 65–99)
GLUCOSE BLD-MCNC: 136 MG/DL (ref 65–99)
GLUCOSE BLD-MCNC: 136 MG/DL (ref 65–99)
GLUCOSE BLD-MCNC: 140 MG/DL (ref 70–99)
POTASSIUM SERPL-SCNC: 4.2 MMOL/L (ref 3.6–5.1)
SODIUM SERPL-SCNC: 137 MMOL/L (ref 136–144)

## 2017-07-08 PROCEDURE — 99232 SBSQ HOSP IP/OBS MODERATE 35: CPT | Performed by: HOSPITALIST

## 2017-07-08 PROCEDURE — 82962 GLUCOSE BLOOD TEST: CPT

## 2017-07-08 RX ORDER — HEPARIN SODIUM 5000 [USP'U]/ML
5000 INJECTION, SOLUTION INTRAVENOUS; SUBCUTANEOUS EVERY 8 HOURS SCHEDULED
Status: DISCONTINUED | OUTPATIENT
Start: 2017-07-08 | End: 2017-07-13

## 2017-07-08 RX ORDER — BUDESONIDE AND FORMOTEROL FUMARATE DIHYDRATE 160; 4.5 UG/1; UG/1
1 AEROSOL RESPIRATORY (INHALATION) 2 TIMES DAILY
Status: DISCONTINUED | OUTPATIENT
Start: 2017-07-08 | End: 2017-07-18

## 2017-07-08 NOTE — CONSULTS
[facility]  Report of Consultation    Shwetha Humphries Patient Status:  Inpatient    1940 MRN [id number]   [facility] 2NE-A Attending Symone Toro MD   Hosp Day # 0 PCP Cuong Lomeli MD     Reason for Consultation:  [facility] obstructive pulmonary disease) ([facility])    • Coronary atherosclerosis of unspecified type of vessel, native or graft    • Difficulty breathing    • High blood pressure    • High cholesterol    • Hypercholesterolemia    • Neuropathy ([facility])     hands, legs, feet inhaler 2 puff, 2 puff, Inhalation, Q6H PRN  •  [START ON 7/8/2017] aspirin EC tab 81 mg, 81 mg, Oral, Daily  •  atorvastatin (LIPITOR) tab 40 mg, 40 mg, Oral, Nightly  •  [START ON 7/8/2017] bisacodyl (DULCOLAX) EC tab 5 mg, 5 mg, Oral, Daily  •  gabapent .0 07/07/2017   CREATSERUM 1.17 07/07/2017   BUN 21 07/07/2017    07/07/2017   K 3.9 07/07/2017   CL 98 07/07/2017   CO2 35.0 07/07/2017   GLU 79 07/07/2017   CA 9.7 07/07/2017   PGLU 111 07/07/2017               ASSESSMENT / PLAN:     1.  Ex

## 2017-07-08 NOTE — RESPIRATORY THERAPY NOTE
SEBLE : EQUIPMENT USE: DAILY SUMMARY                                            SET MODE:                                          USAGE IN HOURS:                                          90% EXP. PRESSURE(EPAP):

## 2017-07-08 NOTE — DIETARY MALNUTRITION NOTE
[address] ASSESSMENT    Pt is at moderate nutrition risk. Pt meets malnutrition criteria.     NUTRITION DIAGNOSIS/PROBLEM:    Malnutrition related to inadequate protein-energy intake due to chronic illness as evidenced by 14% unin calories/day (25-30 calories per kg)  Protein: 102-127 grams protein/day (1.2-1.5 grams protein per kg)  Fluid: ~1 ml/kcal or per MD discretion    MONITOR AND EVALUATE/NUTRITION GOALS:    1. PO intake to meet at least 75% patient nutrition prescription  2.

## 2017-07-08 NOTE — PROGRESS NOTES
[facility]  Progress Note     Jovi Beck Patient Status:  Inpatient    1940 MRN [id number]   [facility] 2NE-A Attending Gino Melendez MD   Hosp Day # 1 PCP Clark Mccullough MD     Chief Complaint: SOB    S: Patient ASSESSMENT / PLAN:     1. 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 Diuresis per cardiology  2. CVS/cardiology trying to expedite surgical intervention at [facility]  2. DMII  1. SSI  3. COPD  1. Controlled   4. Dyslipidemia  1.  Statin

## 2017-07-08 NOTE — PROGRESS NOTES
· [facility] Progress Note     Subjective:  Hasn't been up so can't comment on dyspnea. Good urine output overnight. No cp. Has been getting vega since a few months after surgery in 2015, but worsening since march.      Objective:  Afebrile  13

## 2017-07-09 LAB
ALBUMIN SERPL-MCNC: 3.1 G/DL (ref 3.5–4.8)
ALP LIVER SERPL-CCNC: 158 U/L (ref 45–117)
ALT SERPL-CCNC: 23 U/L (ref 17–63)
AST SERPL-CCNC: 20 U/L (ref 15–41)
BILIRUB SERPL-MCNC: 0.5 MG/DL (ref 0.1–2)
BUN BLD-MCNC: 22 MG/DL (ref 8–20)
CALCIUM BLD-MCNC: 9.6 MG/DL (ref 8.3–10.3)
CHLORIDE: 95 MMOL/L (ref 101–111)
CO2: 39 MMOL/L (ref 22–32)
CREAT BLD-MCNC: 1.28 MG/DL (ref 0.7–1.3)
ERYTHROCYTE [DISTWIDTH] IN BLOOD BY AUTOMATED COUNT: 15.2 % (ref 11.5–16)
GLUCOSE BLD-MCNC: 101 MG/DL (ref 70–99)
GLUCOSE BLD-MCNC: 114 MG/DL (ref 65–99)
GLUCOSE BLD-MCNC: 114 MG/DL (ref 65–99)
GLUCOSE BLD-MCNC: 133 MG/DL (ref 65–99)
GLUCOSE BLD-MCNC: 134 MG/DL (ref 65–99)
HAV IGM SER QL: 2.1 MG/DL (ref 1.7–3)
HCT VFR BLD AUTO: 46.3 % (ref 37–53)
HGB BLD-MCNC: 15.2 G/DL (ref 13–17)
M PROTEIN MFR SERPL ELPH: 8.2 G/DL (ref 6.1–8.3)
MCH RBC QN AUTO: 27.8 PG (ref 27–33.2)
MCHC RBC AUTO-ENTMCNC: 32.8 G/DL (ref 31–37)
MCV RBC AUTO: 84.6 FL (ref 80–99)
PLATELET # BLD AUTO: 193 10(3)UL (ref 150–450)
POTASSIUM SERPL-SCNC: 3.8 MMOL/L (ref 3.6–5.1)
PRO-BETA NATRIURETIC PEPTIDE: 244 PG/ML (ref ?–450)
RBC # BLD AUTO: 5.47 X10(6)UL (ref 3.8–5.8)
RED CELL DISTRIBUTION WIDTH-SD: 47.1 FL (ref 35.1–46.3)
SODIUM SERPL-SCNC: 138 MMOL/L (ref 136–144)
WBC # BLD AUTO: 5.6 X10(3) UL (ref 4–13)

## 2017-07-09 PROCEDURE — 99232 SBSQ HOSP IP/OBS MODERATE 35: CPT | Performed by: HOSPITALIST

## 2017-07-09 PROCEDURE — 82962 GLUCOSE BLOOD TEST: CPT

## 2017-07-09 RX ORDER — POTASSIUM CHLORIDE 20 MEQ/1
40 TABLET, EXTENDED RELEASE ORAL ONCE
Status: COMPLETED | OUTPATIENT
Start: 2017-07-09 | End: 2017-07-09

## 2017-07-09 NOTE — RESPIRATORY THERAPY NOTE
SEBLE : EQUIPMENT USE: DAILY SUMMARY                                            SET MODE:  CPAP WITH CFLEX                                          USAGE IN HOURS: 0:48                                          90% EX

## 2017-07-09 NOTE — PLAN OF CARE
Pt is alert and oriented denies any pain or discomfort. He is on RA 95% lungs diminished no crackles noted pt is on a lasix gtt at 10cc/hr. TELE SR no edema to le skin is dry and discolored to le 1+PP pt is on a fluid restriction  He has qid glucoscans.

## 2017-07-09 NOTE — PROGRESS NOTES
· [facility] Progress Note     Subjective:  No real changes, still STEVENSON. Objective:  Afebrile  118/51   SR  I/O -1162   BUN/cr 22/1.28    Neuro:awake/alert; nml affect.    HEENT: JVD present and level increases with inspiration; , moist muco

## 2017-07-09 NOTE — PROGRESS NOTES
[facility]  Progress Note     Kathy Johnson Patient Status:  Inpatient    1940 MRN [id number]   [facility] 2NE-A Attending Oriana Garcia MD   Hosp Day # 2 PCP Myrna Pate MD     Chief Complaint: SOB    S: Patient BID   • Tiotropium Bromide Monohydrate  1 puff Inhalation Daily   • aspirin  81 mg Oral Daily   • atorvastatin  40 mg Oral Nightly   • bisacodyl  5 mg Oral Daily   • gabapentin  900 mg Oral BID   • ramipril  5 mg Oral Daily   • insulin aspart  1-10 Units S

## 2017-07-10 LAB
BUN BLD-MCNC: 24 MG/DL (ref 8–20)
CALCIUM BLD-MCNC: 10.3 MG/DL (ref 8.3–10.3)
CHLORIDE: 95 MMOL/L (ref 101–111)
CO2: 38 MMOL/L (ref 22–32)
CREAT BLD-MCNC: 1.22 MG/DL (ref 0.7–1.3)
GLUCOSE BLD-MCNC: 111 MG/DL (ref 65–99)
GLUCOSE BLD-MCNC: 117 MG/DL (ref 65–99)
GLUCOSE BLD-MCNC: 130 MG/DL (ref 70–99)
GLUCOSE BLD-MCNC: 150 MG/DL (ref 65–99)
GLUCOSE BLD-MCNC: 215 MG/DL (ref 65–99)
POTASSIUM SERPL-SCNC: 4 MMOL/L (ref 3.6–5.1)
POTASSIUM SERPL-SCNC: 4 MMOL/L (ref 3.6–5.1)
SODIUM SERPL-SCNC: 137 MMOL/L (ref 136–144)

## 2017-07-10 PROCEDURE — 99232 SBSQ HOSP IP/OBS MODERATE 35: CPT | Performed by: HOSPITALIST

## 2017-07-10 PROCEDURE — 82962 GLUCOSE BLOOD TEST: CPT

## 2017-07-10 PROCEDURE — 80048 BASIC METABOLIC PNL TOTAL CA: CPT | Performed by: NURSE PRACTITIONER

## 2017-07-10 PROCEDURE — 84132 ASSAY OF SERUM POTASSIUM: CPT | Performed by: HOSPITALIST

## 2017-07-10 RX ORDER — RAMIPRIL 2.5 MG/1
2.5 CAPSULE ORAL DAILY
Status: DISCONTINUED | OUTPATIENT
Start: 2017-07-11 | End: 2017-07-18

## 2017-07-10 NOTE — PLAN OF CARE
Patient is alert and oriented. Patient saturates well on room air, CPAP at night. NSR on the tele. Patient denies any pain or SOB. Patient ambulates with SBA. POC updated with patient and family at bedside.   Call light within reach, will continue to [facility]

## 2017-07-10 NOTE — PLAN OF CARE
Pt currently resting comfortably in bed. RA, spo2>93%. SR on tele. A&Ox3. Lasix gtt 10 mg/hr as ordered. Up ad lester. Voids per urinal.  Awaiting plan from Dr. Ciro Wright service. Will continue to monitor.                                 CARDIOVASCULAR - AD

## 2017-07-10 NOTE — PROGRESS NOTES
Seen and examined. Weekend course reviewed with Dr. Bonnetta Najjar. Doesn't feel any better from breathing standpoint.     Afebrile  121/45  62 regular    Lungs clear anteriorly  Ht RRR  abd soft  Ext no edema    Negative almost 4 liters since admission    24/

## 2017-07-10 NOTE — CM/SW NOTE
ORLY met with patient to assess for discharge needs. Patient was recently discharged from [facility] 6/18. Patient states he understood his discharge instructions, knew who to call if things got worse, and was taking his medications as directed.   Aida discharge was follow-up appointment? 8-14 days   Was full assessment completed by SW/CM on prior admission? Yes   Was the recommended discharge plan achieved? Yes   Was pt. discharged w/out services?  Yes   Pertinent Medical Hx   Primary Care Physician Name

## 2017-07-10 NOTE — RESPIRATORY THERAPY NOTE
SEBLE - Equipment Use Daily Summary:                  . Set Mode: CPAP WITH C-FLEX                . Usage in hours: 5:02                . 90% Pressure (EPAP) level:10                . 90% Insp. Pressure (IPAP): Dimitri Dalton AHI:0.4                .  Supple

## 2017-07-11 LAB
BUN BLD-MCNC: 23 MG/DL (ref 8–20)
CALCIUM BLD-MCNC: 9.8 MG/DL (ref 8.3–10.3)
CHLORIDE: 96 MMOL/L (ref 101–111)
CO2: 36 MMOL/L (ref 22–32)
CREAT BLD-MCNC: 1.28 MG/DL (ref 0.7–1.3)
GLUCOSE BLD-MCNC: 108 MG/DL (ref 65–99)
GLUCOSE BLD-MCNC: 128 MG/DL (ref 65–99)
GLUCOSE BLD-MCNC: 134 MG/DL (ref 70–99)
GLUCOSE BLD-MCNC: 151 MG/DL (ref 65–99)
GLUCOSE BLD-MCNC: 152 MG/DL (ref 65–99)
POTASSIUM SERPL-SCNC: 3.8 MMOL/L (ref 3.6–5.1)
SODIUM SERPL-SCNC: 137 MMOL/L (ref 136–144)

## 2017-07-11 PROCEDURE — 99232 SBSQ HOSP IP/OBS MODERATE 35: CPT | Performed by: HOSPITALIST

## 2017-07-11 PROCEDURE — 82962 GLUCOSE BLOOD TEST: CPT

## 2017-07-11 RX ORDER — POTASSIUM CHLORIDE 20 MEQ/1
40 TABLET, EXTENDED RELEASE ORAL ONCE
Status: COMPLETED | OUTPATIENT
Start: 2017-07-11 | End: 2017-07-11

## 2017-07-11 NOTE — PROGRESS NOTES
[facility]  Progress Note     Fer Ibarra Patient Status:  Inpatient    1940 MRN [id number]   [facility] 2NE-A Attending Ashish Farfan MD   [place] Day # 4 PCP Newton Slaughter MD     Chief Complaint: SOB    S: Patient • Tiotropium Bromide Monohydrate  1 puff Inhalation Daily   • aspirin  81 mg Oral Daily   • atorvastatin  40 mg Oral Nightly   • bisacodyl  5 mg Oral Daily   • gabapentin  900 mg Oral BID   • insulin aspart  1-10 Units Subcutaneous TID AC and HS   • insu

## 2017-07-11 NOTE — PROGRESS NOTES
Seen and examined. No major change clinically.     Afebrile  120/39    66 regular    Lungs decreased BS right base  Ht RRR  abd soft  Ext no edema    Good urine output    23/1.3    A/P: Constrictive pericarditis with acute on chronic diastolic heart failure

## 2017-07-11 NOTE — RESPIRATORY THERAPY NOTE
SEBLE - Equipment Use Daily Summary:                  . Set Mode: CPAP WITH C-FLEX                . Usage in hours: 3:44                . 90% Pressure (EPAP) level: 10                . 90% Insp. Pressure (IPAP): Ruchi Mathews AHI: 0                .  Supple

## 2017-07-12 ENCOUNTER — APPOINTMENT (OUTPATIENT)
Dept: CT IMAGING | Facility: HOSPITAL | Age: 77
DRG: 292 | End: 2017-07-12
Attending: NURSE PRACTITIONER
Payer: MEDICARE

## 2017-07-12 LAB
BUN BLD-MCNC: 24 MG/DL (ref 8–20)
CALCIUM BLD-MCNC: 9.7 MG/DL (ref 8.3–10.3)
CHLORIDE: 96 MMOL/L (ref 101–111)
CO2: 35 MMOL/L (ref 22–32)
CREAT BLD-MCNC: 1.18 MG/DL (ref 0.7–1.3)
GLUCOSE BLD-MCNC: 108 MG/DL (ref 65–99)
GLUCOSE BLD-MCNC: 130 MG/DL (ref 70–99)
GLUCOSE BLD-MCNC: 133 MG/DL (ref 65–99)
GLUCOSE BLD-MCNC: 160 MG/DL (ref 65–99)
GLUCOSE BLD-MCNC: 199 MG/DL (ref 65–99)
GLUCOSE BLD-MCNC: 99 MG/DL (ref 65–99)
POTASSIUM SERPL-SCNC: 3.7 MMOL/L (ref 3.6–5.1)
POTASSIUM SERPL-SCNC: 3.7 MMOL/L (ref 3.6–5.1)
PRO-BETA NATRIURETIC PEPTIDE: 232 PG/ML (ref ?–450)
SODIUM SERPL-SCNC: 137 MMOL/L (ref 136–144)

## 2017-07-12 PROCEDURE — 71250 CT THORAX DX C-: CPT | Performed by: NURSE PRACTITIONER

## 2017-07-12 PROCEDURE — 82962 GLUCOSE BLOOD TEST: CPT

## 2017-07-12 PROCEDURE — 99232 SBSQ HOSP IP/OBS MODERATE 35: CPT | Performed by: INTERNAL MEDICINE

## 2017-07-12 RX ORDER — SPIRONOLACTONE 25 MG/1
25 TABLET ORAL DAILY
Status: DISCONTINUED | OUTPATIENT
Start: 2017-07-12 | End: 2017-07-18

## 2017-07-12 RX ORDER — POTASSIUM CHLORIDE 20 MEQ/1
40 TABLET, EXTENDED RELEASE ORAL ONCE
Status: COMPLETED | OUTPATIENT
Start: 2017-07-12 | End: 2017-07-12

## 2017-07-12 NOTE — PROGRESS NOTES
[facility]  Progress Note     Bradly Gandhi Patient Status:  Inpatient    1940 MRN [id number]   [facility] 2NE-A Attending Yudy Clement MD   Hosp Day # 5 PCP Johanna Rey MD     Chief Complaint: SOB    S: Patient Oral Daily   • gabapentin  900 mg Oral BID   • Insulin Aspart Pen  1-10 Units Subcutaneous TID AC and HS   • Insulin Aspart Pen  1-10 Units Subcutaneous TID CC       ASSESSMENT / PLAN:     1. 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  Diuresis pe

## 2017-07-12 NOTE — RESPIRATORY THERAPY NOTE
SEBLE - Equipment Use Daily Summary:                  . Set Mode:  CPAP WITH C-FLEX                . Usage in hours: 4:37                . 90% Pressure (EPAP) level: 10                . 90% Insp. Pressure (IPAP): Zakia Montana AHI: 0.2                .  Sup

## 2017-07-12 NOTE — DIETARY NOTE
[address]    Pt is at moderate nutrition risk. Pt meets malnutrition criteria.     NUTRITION DIAGNOSIS/PROBLEM:    Malnutrition related to inadequate protein-energy intake due to chronic illness as evidenced by 14% uni PHYSICAL FINDINGS:     1. Body Fat/Muscle Mass: some visible muscle wasting present     2.  Fluid Accumulation:     NUTRITION PRESCRIPTION:  Calories: 6284-5287 calories/day (25-30 calories per kg)  Protein: 102-127 grams protein/day (1.2-1.5 grams protein

## 2017-07-12 NOTE — PLAN OF CARE
CT disc given to patient. Plan to [facility] on 7/20/17.     Elisabeth Allen, 07/12/17, 2:05 PM

## 2017-07-12 NOTE — PROGRESS NOTES
[facility]  Cardiology Progress Note    Lamberto Villegas Patient Status:  Inpatient    1940 MRN [id number]   [facility] 2NE-A Attending Stefan Bob MD   Hosp Day # 5 PCP Edita Driscoll MD       Subjective:  Feels tired. JVD  Cardiac:  S1, S2, regular rate soft COSTA  Lungs:   Diminished  BS in the bases  Abdomen:  distended, soft, non-tender, BS+. Extremities:    No LE edema. 2+ peripheral pulses.   Neuro:   Alert and oriented x 3; moves all extremities equally   Skin:   w

## 2017-07-12 NOTE — PLAN OF CARE
Problem: CARDIOVASCULAR - ADULT  Goal: Maintains optimal cardiac output and hemodynamic stability  INTERVENTIONS:  - Monitor vital signs, rhythm, and trends  - Monitor for bleeding, hypotension and signs of decreased cardiac output  - Evaluate effectivenes bladder, last bowel movement was 7/09/17; up with a standby assist, states he uses a walker at home for long distance; furosemide drip infusing as ordered; patient with no complaints.      1215: Per patient's wife, patient is to be at the [facility] on 7/2

## 2017-07-12 NOTE — PHYSICAL THERAPY NOTE
PHYSICAL THERAPY EVALUATION - INPATIENT     Room Number: 7141/8698-K  Evaluation Date: 7/12/2017  Type of Evaluation: Initial  Physician Order: PT Eval and Treat    Presenting Problem: CHF  Reason for Therapy: Mobility Dysfunction and Discharge Planning complex ventral                herniorrhaphy with composix mesh  02/11/2005: OTHER SURGICAL HISTORY      Comment: surgery for abdominal aortic aneurysm  1/30/2015: OTHER SURGICAL HISTORY      Comment: aortic valve replacement  4/1/2016: REVISE MEDIAN N/CAR Fair +  Static Standing: Fair -  Dynamic Standing: Poor +      ACTIVITY TOLERANCE  O2 Saturation at rest 94-95% and with activity 100%  Room air  Shortness of breath  Heart Rate: at rest 64-66 bpm and with activity 79 bpm     MODIFIED ALICIA DYSPNEA SCALE - definitions per department policy    Skilled Therapy Provided: The pt was approached for physical therapy lying supine in bed. Pt completed supine>sit to EOB with Mod I.  Pt completed sit>stand to RW with SBA. Pt ambulated 50 feet with CGA using a RW.   P training;Strengthening;Transfer training;Balance training; Endurance  Rehab Potential : Good  Frequency (Obs): 3x/week  Number of Visits to Meet Established Goals: 3      CURRENT GOALS    Goal #1 Patient is able to demonstrate supine - sit EOB @ level: lizbeth

## 2017-07-12 NOTE — PLAN OF CARE
Call from Prem Parker RN that wife was refusing CT of the chest. She was told it would be done at [facility]. Informed Dr Jackelyn Holt. Later, patient agreed to go for the test. Will get results copied to a CD for the patient.     Carolina Melissa, 07/12/17, 12:53 PM

## 2017-07-13 PROBLEM — E46 MALNUTRITION (HCC): Status: ACTIVE | Noted: 2017-07-13

## 2017-07-13 PROBLEM — I50.30: Status: ACTIVE | Noted: 2017-07-13

## 2017-07-13 LAB
ALLENS TEST: POSITIVE
ARTERIAL BLD GAS O2 SATURATION: 91 % (ref 92–100)
ARTERIAL BLOOD GAS BASE EXCESS: 5.4
ARTERIAL BLOOD GAS HCO3: 31.3 MEQ/L (ref 22–26)
ARTERIAL BLOOD GAS PCO2: 48 MM HG (ref 35–45)
ARTERIAL BLOOD GAS PH: 7.43 (ref 7.35–7.45)
ARTERIAL BLOOD GAS PO2: 60 MM HG (ref 80–105)
BUN BLD-MCNC: 25 MG/DL (ref 8–20)
CALCIUM BLD-MCNC: 9.9 MG/DL (ref 8.3–10.3)
CALCULATED O2 SATURATION: 93 % (ref 92–100)
CARBOXYHEMOGLOBIN: 1.7 % SAT (ref 0–3)
CHLORIDE: 97 MMOL/L (ref 101–111)
CO2: 32 MMOL/L (ref 22–32)
CREAT BLD-MCNC: 1.07 MG/DL (ref 0.7–1.3)
GLUCOSE BLD-MCNC: 108 MG/DL (ref 65–99)
GLUCOSE BLD-MCNC: 131 MG/DL (ref 65–99)
GLUCOSE BLD-MCNC: 134 MG/DL (ref 70–99)
GLUCOSE BLD-MCNC: 163 MG/DL (ref 65–99)
GLUCOSE BLD-MCNC: 181 MG/DL (ref 65–99)
METHEMOGLOBIN: 0.6 % SAT (ref 0.4–1.5)
PATIENT TEMPERATURE: 97 F
POTASSIUM SERPL-SCNC: 3.9 MMOL/L (ref 3.6–5.1)
SODIUM SERPL-SCNC: 136 MMOL/L (ref 136–144)
TOTAL HEMOGLOBIN: 15.5 G/DL (ref 12.6–17.4)

## 2017-07-13 PROCEDURE — 99232 SBSQ HOSP IP/OBS MODERATE 35: CPT | Performed by: INTERNAL MEDICINE

## 2017-07-13 PROCEDURE — 82962 GLUCOSE BLOOD TEST: CPT

## 2017-07-13 NOTE — PROGRESS NOTES
[facility]  Cardiology Progress Note    Estrada Grande Patient Status:  Inpatient    1940 MRN [id number]   [facility] 2NE-A Attending Larissa Stallworth MD   [address] Day # 6 PCP Crissy Hopkins MD       Subjective:  C/O STEVENSON and d • Insulin Aspart Pen  1-10 Units Subcutaneous TID CC       Physical Exam:  General:  Alert, in no apparent distress  Neck:  mild JVD  Cardiac:  S1, S2, regular rate soft COSTA  Lungs:   Diminished  BS in the bases, especially on right.   Tachypnea, abdomina

## 2017-07-13 NOTE — PLAN OF CARE
Problem: Diabetes/Glucose Control  Goal: Glucose maintained within prescribed range  INTERVENTIONS:  - Monitor Blood Glucose as ordered  - Assess for signs and symptoms of hyperglycemia and hypoglycemia  - Administer ordered medications to maintain glucose for suctioning and perform as needed  - Assess and instruct to report SOB or any respiratory difficulty  - Respiratory Therapy support as indicated  - Manage/alleviate anxiety  - Monitor for signs/symptoms of CO2 retention   Outcome: Not Progressing  .

## 2017-07-13 NOTE — CONSULTS
[facility]  Report of Consultation    Terrance Wooten Patient Status:  Inpatient    1940 MRN [id number]   [facility] 2NE-A Attending Julius Middleton MD   [facility] Day # 6 PCP Marshal Ordoñez MD     Reason for Consultation:   Ac since admission he feels no better. There is minimal cough at most he denies any chest pain ongoing mild orthopnea.   He has had no ankle edema but notes increasing abdominal distention    History:  Past Medical History:   Diagnosis Date   • Aortic stenosi Disp:  Rfl:  7/7/2017 at Unknown time   glimepiride 2 MG Oral Tab Take 1 mg by mouth every evening. Disp:  Rfl:  7/6/2017 at Unknown time   gabapentin 300 MG Oral Cap Take 900 mg by mouth 2 (two) times daily.  Disp:  Rfl:  7/7/2017 at Unknown time   [facility] last 24 hours:  Patient Vitals for the past 24 hrs:   BP Temp Temp src Pulse Resp SpO2 Weight   07/13/17 1230 106/53 97.9 °F (36.6 °C) Oral 66 20 94 % -   07/13/17 0906 119/58 (!) 97.4 °F (36.3 °C) Oral 67 20 93 % -   07/13/17 0500 - 98 °F (36.7 °C) Oral 5 plaquing    Assessment and Plan:  Patient Active Problem List:     Peripheral vascular disease, unspecified (HCC)     COPD (chronic obstructive pulmonary disease) ([facility] [address]     Diabetes mellitus type 2, controlled, with complications ([facility]     Essential hypert pleasant patient        Alexis Hernadez  7/13/2017  2:28 PM

## 2017-07-13 NOTE — PROGRESS NOTES
[facility]  Progress Note     Karyna Chase Patient Status:  Inpatient    1940 MRN [id number]   [facility] 2NE-A Attending Bobby Kessler MD   [address] Day # 6 PCP Demario Mclaughlin MD     Chief Complaint: SOB    S: Patient 900 mg Oral BID   • Insulin Aspart Pen  1-10 Units Subcutaneous TID AC and HS   • Insulin Aspart Pen  1-10 Units Subcutaneous TID CC       ASSESSMENT / PLAN:     1. 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  Diuresis per cardiology - on lasix gtt

## 2017-07-13 NOTE — RESPIRATORY THERAPY NOTE
SEBLE : EQUIPMENT USE: DAILY SUMMARY                                            SET MODE: CPAP WITH CFLEX                                          USAGE IN HOURS: 1:51                                          90% EXP

## 2017-07-13 NOTE — PLAN OF CARE
Assumed care of patient around 1930, alert and oriented X4, no c/o CP/SOB, SpO2 95 % on RA, CPAP at night  Rhythm/HR: NSR 60s  Lasix gtt running at 10m/hr  Plan of care updated with patient, all questions answered       CARDIOVASCULAR - ADULT    • Maintain

## 2017-07-14 ENCOUNTER — APPOINTMENT (OUTPATIENT)
Dept: ULTRASOUND IMAGING | Facility: HOSPITAL | Age: 77
DRG: 292 | End: 2017-07-14
Attending: INTERNAL MEDICINE
Payer: MEDICARE

## 2017-07-14 ENCOUNTER — APPOINTMENT (OUTPATIENT)
Dept: GENERAL RADIOLOGY | Facility: HOSPITAL | Age: 77
DRG: 292 | End: 2017-07-14
Attending: RADIOLOGY
Payer: MEDICARE

## 2017-07-14 ENCOUNTER — TELEPHONE (OUTPATIENT)
Dept: FAMILY MEDICINE CLINIC | Facility: CLINIC | Age: 77
End: 2017-07-14

## 2017-07-14 LAB
ANA SCREEN: NEGATIVE
BASOPHIL PLEURAL FLUID: 0 %
EOSINOPHIL PLEURAL FLUID: 0 %
GLUCOSE BLD-MCNC: 124 MG/DL (ref 65–99)
GLUCOSE BLD-MCNC: 128 MG/DL (ref 65–99)
GLUCOSE BLD-MCNC: 145 MG/DL (ref 65–99)
GLUCOSE BLD-MCNC: 203 MG/DL (ref 65–99)
GLUCOSE PLEURAL FLUID: 117 MG/DL
INR BLD: 1.1 (ref 0.89–1.11)
LDH PLEURAL FLUID: 241 U/L
LYMPHOCYTE PLEURAL FLUID: 75 %
MONO/MAC/HISTIO PLEURAL FLUID: 2 %
NEUTROPHIL PLEURAL FLUID: 23 %
PSA SERPL DL<=0.01 NG/ML-MCNC: 14.2 SECONDS (ref 12–14.3)
RBC PLEURAL FLUID: NORMAL /MM3
RHEUMATOID FACT SERPL-ACNC: 149 IU/ML (ref ?–15)
SED RATE-ML: 40 MM/HR (ref 0–12)
TOTAL CELLS COUNTED: 100
TOTAL PROTEIN PLEURAL FLUID: 4.4 G/DL
WBC PLEURAL FLUID: 868 /MM3

## 2017-07-14 PROCEDURE — 99232 SBSQ HOSP IP/OBS MODERATE 35: CPT | Performed by: INTERNAL MEDICINE

## 2017-07-14 PROCEDURE — 32555 ASPIRATE PLEURA W/ IMAGING: CPT | Performed by: INTERNAL MEDICINE

## 2017-07-14 PROCEDURE — 82962 GLUCOSE BLOOD TEST: CPT

## 2017-07-14 PROCEDURE — 71010 XR CHEST AP/PA (1 VIEW) (CPT=71010): CPT | Performed by: RADIOLOGY

## 2017-07-14 PROCEDURE — 0W9B3ZZ DRAINAGE OF LEFT PLEURAL CAVITY, PERCUTANEOUS APPROACH: ICD-10-PCS | Performed by: RADIOLOGY

## 2017-07-14 RX ORDER — HEPARIN SODIUM 5000 [USP'U]/ML
5000 INJECTION, SOLUTION INTRAVENOUS; SUBCUTANEOUS EVERY 8 HOURS SCHEDULED
Status: DISCONTINUED | OUTPATIENT
Start: 2017-07-14 | End: 2017-07-18

## 2017-07-14 NOTE — PROGRESS NOTES
[facility]  Progress Note    Estrada Grande Patient Status:  Inpatient    1940 MRN [id number]   [facility] 2NE-A Attending Larissa Stallworth MD   [address] Day # 7 PCP Crissy Hopkins MD     Subjective:  Estrada Grande is a(n) 7 [facility])     Sensory hearing loss, bilateral     Mild pulmonary hypertension (HCC)     Dyspnea     Dyspnea, unspecified type     Acute chest pain     CHF (congestive heart failure), NYHA class II ([facility])     Chronic combined systolic and diastolic congestive he

## 2017-07-14 NOTE — TELEPHONE ENCOUNTER
LITZY. Pt wife cancelled HFU leticia. He is still in hospital. He will be going to [facility] to have open heart surgery on 7/24.

## 2017-07-14 NOTE — PROGRESS NOTES
[facility]  Cardiology Progress Note    Bradly Gandhi Patient Status:  Inpatient    1940 MRN [id number]   [facility] 2NE-A Attending Yudy Clement MD   Hosp Day # 7 PCP Johanna Rey MD       Subjective:  Still STEVENSON and Physical Exam:  General:  Alert, in no apparent distress  Neck:  mild JVD  Cardiac:  S1, S2, regular rate soft COSTA  Lungs:   Diminished  BS in the bases, especially on right. Tachypnea, abdominal breathing.   Abdomen:  distended, soft, non-tender, BS

## 2017-07-14 NOTE — RESPIRATORY THERAPY NOTE
SEBLE - Equipment Use Daily Summary:                  . Set Mode: CPAP WITH C-FLEX                . Usage in hours: 3:18                . 90% Pressure (EPAP) level: 10                . 90% Insp. Pressure (IPAP): Naveen Bob AHI: 0                .  Supple

## 2017-07-14 NOTE — PROCEDURES
[facility]  Procedure Note    Esha Rasmussen Patient Status:  Inpatient    1940 MRN [id number]   [facility] 2NE-A Attending Ortega Hendricks MD   [facility] Day # 7 PCP Rose Burnett MD     Procedure: US guided left thoracentesi

## 2017-07-14 NOTE — PROGRESS NOTES
[facility]  Progress Note     Leticia Agarwal Patient Status:  Inpatient    1940 MRN [id number]   [facility] 2NE-A Attending Ben Archuleta MD   [facility] Day # 7 PCP Deb Bose MD     Chief Complaint: SOB    S: Patient Subcutaneous TID CC       ASSESSMENT / PLAN:     1. 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 Diuresis per cardiology - on lasix gtt  2. CV[facility]/cardiology trying to expedite surgical intervention at [facility]: tentative plan for [address]   2.  Ple

## 2017-07-14 NOTE — PLAN OF CARE
Problem: Diabetes/Glucose Control  Goal: Glucose maintained within prescribed range  INTERVENTIONS:  - Monitor Blood Glucose as ordered  - Assess for signs and symptoms of hyperglycemia and hypoglycemia  - Administer ordered medications to maintain glucose suctioning and perform as needed  - Assess and instruct to report SOB or any respiratory difficulty  - Respiratory Therapy support as indicated  - Manage/alleviate anxiety  - Monitor for signs/symptoms of CO2 retention   Outcome: Progressing      Problem:

## 2017-07-14 NOTE — IMAGING NOTE
Pt had left sided thoracentesis per . Pt had 300 cc of bloody drainage removed. Pt tolerated procedure well.   Report called to floor and pt transported

## 2017-07-14 NOTE — CM/SW NOTE
Case discussed with ARTHUR Moore. Patient will be going to the [facility] for an appointment on 7/20. At this point, he is too ill to transport himself. Sharath Allen and ORLY discussed possibility of patient going to [facility] by ambulance.       ORLY spoke with LISA

## 2017-07-14 NOTE — PLAN OF CARE
Plan to [facility] on 7/20/17. Insurance will cover medical transport to [facility]. Appt is very early in the morning. Will need to determine when patient and be transferred.      Deborah Sandifer, 07/14/17, 3:51 PM

## 2017-07-15 LAB
ALBUMIN SERPL-MCNC: 3.1 G/DL (ref 3.5–4.8)
ALP LIVER SERPL-CCNC: 152 U/L (ref 45–117)
ALT SERPL-CCNC: 60 U/L (ref 17–63)
AST SERPL-CCNC: 50 U/L (ref 15–41)
BILIRUB SERPL-MCNC: 0.6 MG/DL (ref 0.1–2)
BUN BLD-MCNC: 30 MG/DL (ref 8–20)
BUN BLD-MCNC: 31 MG/DL (ref 8–20)
CALCIUM BLD-MCNC: 9.7 MG/DL (ref 8.3–10.3)
CALCIUM BLD-MCNC: 9.9 MG/DL (ref 8.3–10.3)
CHLORIDE: 94 MMOL/L (ref 101–111)
CHLORIDE: 94 MMOL/L (ref 101–111)
CO2: 32 MMOL/L (ref 22–32)
CO2: 33 MMOL/L (ref 22–32)
CREAT BLD-MCNC: 1.11 MG/DL (ref 0.7–1.3)
CREAT BLD-MCNC: 1.13 MG/DL (ref 0.7–1.3)
GLUCOSE BLD-MCNC: 117 MG/DL (ref 65–99)
GLUCOSE BLD-MCNC: 123 MG/DL (ref 65–99)
GLUCOSE BLD-MCNC: 136 MG/DL (ref 70–99)
GLUCOSE BLD-MCNC: 138 MG/DL (ref 70–99)
GLUCOSE BLD-MCNC: 139 MG/DL (ref 65–99)
GLUCOSE BLD-MCNC: 145 MG/DL (ref 65–99)
LDH: 163 U/L (ref 84–249)
M PROTEIN MFR SERPL ELPH: 7.8 G/DL (ref 6.1–8.3)
POTASSIUM SERPL-SCNC: 3.9 MMOL/L (ref 3.6–5.1)
POTASSIUM SERPL-SCNC: 4 MMOL/L (ref 3.6–5.1)
SODIUM SERPL-SCNC: 133 MMOL/L (ref 136–144)
SODIUM SERPL-SCNC: 134 MMOL/L (ref 136–144)

## 2017-07-15 PROCEDURE — 82962 GLUCOSE BLOOD TEST: CPT

## 2017-07-15 PROCEDURE — 99232 SBSQ HOSP IP/OBS MODERATE 35: CPT | Performed by: INTERNAL MEDICINE

## 2017-07-15 NOTE — RESPIRATORY THERAPY NOTE
SEBLE - Equipment Use Daily Summary:  · Set Mode CPAP  · Usage in hours: 1:00  · 90% Pressure (EPAP) level: 10.0  · 90% Insp Pressure (IPAP):   · AHI: 0.0  · Supplemental Oxygen:   · Comments:

## 2017-07-15 NOTE — PROGRESS NOTES
[facility] Cardiology Progress Note    Subjective:  Feeling a little better after thoracentesis    Objective:  /57 (BP Location: Right arm)   Pulse 61   Temp (!) 97.5 °F (36.4 °C) (Oral)   Resp 16   Ht 185.4 cm (6' 1\")   Wt 184 lb 12.8 oz (83.8 kg)   S

## 2017-07-15 NOTE — PROGRESS NOTES
[facility]  Progress Note     Kathy Johnson Patient Status:  Inpatient    1940 MRN [id number]   [facility] 2NE-A Attending Oriana Garcia MD   [address] Day # 8 PCP Myrna Pate MD     Chief Complaint: SOB    S: Patient Units Subcutaneous TID CC       ASSESSMENT / PLAN:     1. 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 Diuresis per cardiology - on lasix gtt  2.  [facility]/cardiology trying to expedite surgical intervention at [facility]: tentative plan for [facility]

## 2017-07-15 NOTE — PROGRESS NOTES
[facility]  Progress Note    Jonas Orourke Patient Status:  Inpatient    1940 MRN [id number]   [facility] 2NE-A Attending Abebe Granda MD   [facility] Day # 8 PCP Kayla Brandt MD     Subjective:  Jonas Orourke is a(n) 7 S/P AVR (aortic valve replacement)     Perennial non-allergic rhinitis     Pleural effusion     Restrictive pericarditis     Aortic insufficiency     Diabetic polyneuropathy associated with type 2 diabetes mellitus (HCC)     Sensory hearing loss, bilateral

## 2017-07-16 LAB
BUN BLD-MCNC: 27 MG/DL (ref 8–20)
CALCIUM BLD-MCNC: 9.2 MG/DL (ref 8.3–10.3)
CHLORIDE: 95 MMOL/L (ref 101–111)
CO2: 34 MMOL/L (ref 22–32)
CREAT BLD-MCNC: 1.07 MG/DL (ref 0.7–1.3)
GLUCOSE BLD-MCNC: 109 MG/DL (ref 65–99)
GLUCOSE BLD-MCNC: 121 MG/DL (ref 65–99)
GLUCOSE BLD-MCNC: 129 MG/DL (ref 65–99)
GLUCOSE BLD-MCNC: 141 MG/DL (ref 70–99)
GLUCOSE BLD-MCNC: 158 MG/DL (ref 65–99)
POTASSIUM SERPL-SCNC: 3.9 MMOL/L (ref 3.6–5.1)
SODIUM SERPL-SCNC: 136 MMOL/L (ref 136–144)

## 2017-07-16 PROCEDURE — 82962 GLUCOSE BLOOD TEST: CPT

## 2017-07-16 PROCEDURE — 99232 SBSQ HOSP IP/OBS MODERATE 35: CPT | Performed by: INTERNAL MEDICINE

## 2017-07-16 NOTE — PROGRESS NOTES
[facility]  Progress Note    Monika Conteh Patient Status:  Inpatient    1940 MRN [id number]   [facility] 2NE-A Attending Ronda Gregorio MD   [facility] Day # 9 PCP Lanre Morales MD     Subjective:  Monika Conteh is a(n) 7 hypertension     Pure hypercholesterolemia     SEBLE on CPAP     S/P AVR (aortic valve replacement)     Perennial non-allergic rhinitis     Pleural effusion     Restrictive pericarditis     Aortic insufficiency     Diabetic polyneuropathy associated with typ

## 2017-07-16 NOTE — PROGRESS NOTES
[facility] Cardiology Progress Note    Subjective:  Feeling the same, very sob with any activity.      Objective:  /53 (BP Location: Right arm)   Pulse 58   Temp 98.2 °F (36.8 °C) (Oral)   Resp 20   Ht 185.4 cm (6' 1\")   Wt 184 lb 1.4 oz (83.5 kg)   SpO

## 2017-07-16 NOTE — RESPIRATORY THERAPY NOTE
SEBLE - Equipment Use Daily Summary:                  . Set Mode: CPAP WITH C-FLEX                . Usage in hours: 5:33                . 90% Pressure (EPAP) level: 10                . 90% Insp. Pressure (IPAP): Maria De Jesus Peoples AHI: 0.2                .  Supp

## 2017-07-16 NOTE — PROGRESS NOTES
[facility]  Progress Note     Melba Herrera Patient Status:  Inpatient    1940 MRN [id number]   [facility] 2NE-A Attending Hood Biswas MD   [facility] Day # 9 PCP Crystal Garvin MD     Chief Complaint: SOB    S: Patient Daily   • gabapentin  900 mg Oral BID   • Insulin Aspart Pen  1-10 Units Subcutaneous TID AC and HS   • Insulin Aspart Pen  1-10 Units Subcutaneous TID CC       ASSESSMENT / PLAN:     1. 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  Diuresis per car

## 2017-07-17 PROBLEM — I50.30: Chronic | Status: ACTIVE | Noted: 2017-07-13

## 2017-07-17 LAB
BUN BLD-MCNC: 26 MG/DL (ref 8–20)
CALCIUM BLD-MCNC: 9.9 MG/DL (ref 8.3–10.3)
CHLORIDE: 92 MMOL/L (ref 101–111)
CO2: 35 MMOL/L (ref 22–32)
CREAT BLD-MCNC: 1.04 MG/DL (ref 0.7–1.3)
GLUCOSE BLD-MCNC: 126 MG/DL (ref 65–99)
GLUCOSE BLD-MCNC: 131 MG/DL (ref 70–99)
GLUCOSE BLD-MCNC: 135 MG/DL (ref 65–99)
GLUCOSE BLD-MCNC: 144 MG/DL (ref 65–99)
GLUCOSE BLD-MCNC: 232 MG/DL (ref 65–99)
NON GYNE INTERPRETATION: NEGATIVE
POTASSIUM SERPL-SCNC: 3.9 MMOL/L (ref 3.6–5.1)
SODIUM SERPL-SCNC: 134 MMOL/L (ref 136–144)

## 2017-07-17 PROCEDURE — 99232 SBSQ HOSP IP/OBS MODERATE 35: CPT | Performed by: INTERNAL MEDICINE

## 2017-07-17 PROCEDURE — 82962 GLUCOSE BLOOD TEST: CPT

## 2017-07-17 NOTE — PLAN OF CARE
I spoke with Micelle intake at [facility] in [place] at [phone]. They have the paperwork and information they need to accept Mr. Ronan Flowers.      I spoke with Dr. Brynn Slaughter who would like to talk to us again in the am between 9:30 and 10, and they wi

## 2017-07-17 NOTE — PROGRESS NOTES
[facility] Cardiology Progress Note    Subjective:  Feels the same, very sob with any activity.      Objective:  /46 (BP Location: Right arm)   Pulse 61   Temp (!) 97.4 °F (36.3 °C) (Oral)   Resp 20   Ht 185.4 cm (6' 1\")   Wt 184 lb 4.9 oz (83.6 kg)   S

## 2017-07-17 NOTE — PROGRESS NOTES
[facility]  Progress Note     Lorne Doanalli Patient Status:  Inpatient    1940 MRN [id number]   [facility] 2NE-A Attending Zain Cotto MD   Hosp Day # 8 PCP Holden Abbott MD     Chief Complaint: SOB    S: Patient Daily   • aspirin  81 mg Oral Daily   • atorvastatin  40 mg Oral Nightly   • bisacodyl  5 mg Oral Daily   • gabapentin  900 mg Oral BID   • Insulin Aspart Pen  1-10 Units Subcutaneous TID AC and HS   • Insulin Aspart Pen  1-10 Units Subcutaneous TID CC

## 2017-07-17 NOTE — RESPIRATORY THERAPY NOTE
SEBLE Equipment Usage Summary :            Set Mode ; CPAP W FLEX          Usage in Hours:4;10          90% Pressure (EPAP) : 10           90% Insp Pressure (IPAP);           AHI : 0.5          Supplemental Oxygen :      LPM

## 2017-07-17 NOTE — PROCEDURES
This test includes spirometry and flow volume loop done at the bedside during an inpatient hospitalization. Spirometry and flow volume loop appear normal with no evidence of airway obstruction     Spirometry  suggests  severerestriction.  Complete PFT wi

## 2017-07-17 NOTE — PLAN OF CARE
Patient continues on lasix gtt. He remains dyspneic on exertion . He prefers 2L O2 with sats of 99%. SEBLE protocol with cpap at night. Patient to transfer to [facility] via ambulance on July 19 for pericardiectomy and possible redo AVR.

## 2017-07-17 NOTE — PHYSICAL THERAPY NOTE
PHYSICAL THERAPY TREATMENT NOTE - INPATIENT    Room Number: 5354/8591-X     Session: 1   Number of Visits to Meet Established Goals: 3    Presenting Problem: CHF    Problem List  Principal Problem:    Heart failure with preserved ejection fraction, border Procedure: CARPAL TUNNEL RELEASE;  Surgeon:                Ina Gutierrez MD;  Location: [facility]  No date: VALVE REPLACEMENT    SUBJECTIVE  \"God, I hope they can fix this. This is no way to live. \"    Patient’s self-stated goal is to breathe easi Little   -   Need to walk in hospital room?: A Little   -   Climbing 3-5 steps with a railing?: A Little       AM-PAC Score:  Raw Score: 20   PT Approx Degree of Impairment Score: 35.83%   Standardized Score (AM-PAC Scale): 47.67   CMS Modifier (G-Code): C PLAN  PT Treatment Plan: Bed mobility; Energy conservation;Patient education; Family education;Gait training;Strengthening;Transfer training;Balance training; Endurance  Rehab Potential : Good  Frequency (Obs): 3x/week    CURRENT GOALS   Goal #1 Patient i

## 2017-07-17 NOTE — PROGRESS NOTES
[facility]  Progress Note    Kenia Ludwig Patient Status:  Inpatient    1940 MRN [id number]   [facility] 2NE-A Attending Sary Orozco MD   Hosp Day # 10 PCP Greg Coleman MD     Impression     1.  Acute on chronic insufficiency     Diabetic polyneuropathy associated with type 2 diabetes mellitus (HCC)     Sensory hearing loss, bilateral     Mild pulmonary hypertension (HCC)     Dyspnea     Dyspnea, unspecified type     Acute chest pain     CHF (congestive heart fail 4.0  3.9  3.9  3.9   CL  94*  94*  95*  92*   CO2  32.0  33.0*  34.0*  35.0*   ALKPHO  152*   --    --    AST  50*   --    --    ALT  60   --    --    BILT  0.6   --    --    TP  7.8   --    --      @MG@      Lab Results  Component Value Date   INR 1.10 07 PEAK flow  PF Readings from Last 1 Encounters:  No data found for PF        Silvia Angeles  7/17/2017  1:24 PM

## 2017-07-18 ENCOUNTER — APPOINTMENT (OUTPATIENT)
Dept: GENERAL RADIOLOGY | Facility: HOSPITAL | Age: 77
DRG: 292 | End: 2017-07-18
Attending: INTERNAL MEDICINE
Payer: MEDICARE

## 2017-07-18 VITALS
DIASTOLIC BLOOD PRESSURE: 64 MMHG | TEMPERATURE: 98 F | BODY MASS INDEX: 24.34 KG/M2 | SYSTOLIC BLOOD PRESSURE: 133 MMHG | WEIGHT: 183.63 LBS | HEIGHT: 73 IN | HEART RATE: 60 BPM | OXYGEN SATURATION: 98 % | RESPIRATION RATE: 18 BRPM

## 2017-07-18 LAB
BUN BLD-MCNC: 24 MG/DL (ref 8–20)
CALCIUM BLD-MCNC: 9.8 MG/DL (ref 8.3–10.3)
CHLORIDE: 90 MMOL/L (ref 101–111)
CO2: 36 MMOL/L (ref 22–32)
CREAT BLD-MCNC: 1.13 MG/DL (ref 0.7–1.3)
GLUCOSE BLD-MCNC: 131 MG/DL (ref 70–99)
GLUCOSE BLD-MCNC: 137 MG/DL (ref 65–99)
POTASSIUM SERPL-SCNC: 3.7 MMOL/L (ref 3.6–5.1)
SODIUM SERPL-SCNC: 132 MMOL/L (ref 136–144)

## 2017-07-18 PROCEDURE — 82962 GLUCOSE BLOOD TEST: CPT

## 2017-07-18 PROCEDURE — 71010 XR CHEST AP PORTABLE  (CPT=71010): CPT | Performed by: INTERNAL MEDICINE

## 2017-07-18 RX ORDER — RAMIPRIL 2.5 MG/1
2.5 CAPSULE ORAL DAILY
Refills: 0 | Status: SHIPPED | COMMUNITY
Start: 2017-07-18 | End: 2017-10-10

## 2017-07-18 RX ORDER — SPIRONOLACTONE 25 MG/1
25 TABLET ORAL DAILY
Refills: 0 | Status: SHIPPED | COMMUNITY
Start: 2017-07-18 | End: 2017-07-26

## 2017-07-18 RX ORDER — POTASSIUM CHLORIDE 20 MEQ/1
40 TABLET, EXTENDED RELEASE ORAL ONCE
Status: COMPLETED | OUTPATIENT
Start: 2017-07-18 | End: 2017-07-18

## 2017-07-18 NOTE — CM/SW NOTE
Case discussed with ARTHUR Mata. Patient will now be airlifted to [facility]. SW spoke with Gricelda Mayorga,  at [facility] who stated their crew is available to  patient early this afternoon.   Their crew will arrive at [facility]

## 2017-07-18 NOTE — RESPIRATORY THERAPY NOTE
SEBLE - Equipment Use Daily Summary:  · Set Mode CFLEX  · Usage in hours: 6  · 90% Pressure (EPAP) level:   · 90% Insp Pressure (IPAP): 10  · AHI: 0  · Supplemental Oxygen:  · Comments:

## 2017-07-18 NOTE — PROGRESS NOTES
[facility]  Progress Note    Fer Ibarra Patient Status:  Inpatient    1940 MRN [id number]   [facility] 2NE-A Attending Ashish Farfan MD   [address] Day # 11 PCP Newton Slaughter MD       Impression     1.  Acute on chronic Pleural effusion     Restrictive pericarditis     Aortic insufficiency     Diabetic polyneuropathy associated with type 2 diabetes mellitus (HCC)     Sensory hearing loss, bilateral     Mild pulmonary hypertension (HCC)     Dyspnea     Dyspnea, unspecifie 92*  90*   CO2  34.0*  35.0*  36.0*     @MG@      Lab Results  Component Value Date   INR 1.10 07/14/2017   INR 1.20 (H) 05/19/2015   INR 1.23 (H) 05/18/2015        No results for input(s): TSH in the last 72 hours.     No results for input(s): ABGPHT, ABGP

## 2017-07-18 NOTE — PLAN OF CARE
DX: Acute on chronic HF d/t progressive constrictive pericardial disease - continues on IV Lasix; bilateral pleural effusions S/P L thoracentesis 7/14 with 330cc bloody fluid - plan is for hospital to hospital transfer 7/18 to [facility] for consideration

## 2017-07-18 NOTE — PROGRESS NOTES
Tele D/C'd. IV kept in place- as patient is being air lifted to [facility] with lasix gtt infusing. Patient denies chest pain, SOB, dizziness or palpitations. Patient denies calf tenderness.  Patient discharge instructions and medication side effects revie

## 2017-07-18 NOTE — PROGRESS NOTES
[facility]  [facility] Cardiology Progress Note    Maurizio Cymraes Patient Status:  Inpatient    1940 MRN [id number]   [facility] 2NE-A Attending Shaylee Khalil MD   [facility] Day # 11 PCP Charlie Melgar MD     Subjective:  Parris Mcnair Units Subcutaneous TID AC and HS   • Insulin Aspart Pen  1-10 Units Subcutaneous TID CC     Diagnostics:     CRISTOBAL 6/21/17:  FINDINGS:  The rhythm is normal sinus. The left atrium is enlarged.   Left ventricular size appears normal.  Left ventricular systoli Abnormal-appearing right ventricle and pericardium suggesting possible right ventricular constriction. Clinical correlation is advised. cMRI 6/18/17:  FINDINGS: There is no evidence of a significant pericardial effusion.  Pericardial thickness anterio a mean of 37, mean pulmonary capillary wedge pressure 27. Simultaneous left ventricular and right ventricular pressure tracings were performed.   The diastolic curves were virtually superimposable with equal end-diastolic pressures approximately 18-20 mmH catheterization today, was demonstrated to have angiographically nonobstructive coronary atherosclerosis with preserved LV systolic function. He has no significant gradient across his bioprosthetic aortic valve.   He does demonstrate mild aortic insufficie Patient will need hospital to hospital due to patient instability, currently on Lasix drip. · Continue Lasix drip at 10mg/hr.     ARTHUR Zheng  7/18/2017  8:50 AM  [facility] [id number]      =======================================================  Patie

## 2017-07-19 NOTE — CM/SW NOTE
07/19/17 0800   Discharge disposition   Discharged to: (Transfer to [facility])   Discharge transportation Other (comment)  ([employer])

## 2017-07-26 ENCOUNTER — SNF VISIT (OUTPATIENT)
Dept: INTERNAL MEDICINE CLINIC | Age: 77
End: 2017-07-26

## 2017-07-26 ENCOUNTER — LAB ENCOUNTER (OUTPATIENT)
Dept: LAB | Age: 77
End: 2017-07-26
Attending: FAMILY MEDICINE
Payer: COMMERCIAL

## 2017-07-26 ENCOUNTER — SNF ADMIT/H&P (OUTPATIENT)
Dept: FAMILY MEDICINE CLINIC | Facility: CLINIC | Age: 77
End: 2017-07-26

## 2017-07-26 VITALS
HEART RATE: 66 BPM | BODY MASS INDEX: 23 KG/M2 | DIASTOLIC BLOOD PRESSURE: 70 MMHG | WEIGHT: 176.19 LBS | SYSTOLIC BLOOD PRESSURE: 112 MMHG | TEMPERATURE: 99 F

## 2017-07-26 VITALS
OXYGEN SATURATION: 97 % | HEART RATE: 65 BPM | RESPIRATION RATE: 20 BRPM | SYSTOLIC BLOOD PRESSURE: 124 MMHG | DIASTOLIC BLOOD PRESSURE: 71 MMHG

## 2017-07-26 DIAGNOSIS — I31.8 RESTRICTIVE PERICARDITIS: Primary | ICD-10-CM

## 2017-07-26 DIAGNOSIS — E11.8 CONTROLLED TYPE 2 DIABETES MELLITUS WITH COMPLICATION, WITHOUT LONG-TERM CURRENT USE OF INSULIN (HCC): ICD-10-CM

## 2017-07-26 DIAGNOSIS — Z95.2 S/P AVR (AORTIC VALVE REPLACEMENT): ICD-10-CM

## 2017-07-26 DIAGNOSIS — R63.4 WEIGHT LOSS, UNINTENTIONAL: ICD-10-CM

## 2017-07-26 DIAGNOSIS — I25.10 CAD (CORONARY ARTERY DISEASE): ICD-10-CM

## 2017-07-26 DIAGNOSIS — I50.9 CHF (CONGESTIVE HEART FAILURE) (HCC): ICD-10-CM

## 2017-07-26 DIAGNOSIS — R33.9 URINARY RETENTION: ICD-10-CM

## 2017-07-26 DIAGNOSIS — E78.00 PURE HYPERCHOLESTEROLEMIA: ICD-10-CM

## 2017-07-26 DIAGNOSIS — N40.0 BENIGN PROSTATIC HYPERPLASIA, UNSPECIFIED WHETHER LOWER URINARY TRACT SYMPTOMS PRESENT: ICD-10-CM

## 2017-07-26 DIAGNOSIS — G47.33 OSA ON CPAP: ICD-10-CM

## 2017-07-26 DIAGNOSIS — J90 PLEURAL EFFUSION: ICD-10-CM

## 2017-07-26 DIAGNOSIS — I31.8 RESTRICTIVE PERICARDITIS: ICD-10-CM

## 2017-07-26 DIAGNOSIS — I35.1 AORTIC VALVE REGURGITATION, UNSPECIFIED ETIOLOGY: ICD-10-CM

## 2017-07-26 DIAGNOSIS — J44.9 COPD (CHRONIC OBSTRUCTIVE PULMONARY DISEASE) (HCC): Primary | ICD-10-CM

## 2017-07-26 DIAGNOSIS — Z99.89 OSA ON CPAP: ICD-10-CM

## 2017-07-26 DIAGNOSIS — I50.30: Primary | Chronic | ICD-10-CM

## 2017-07-26 DIAGNOSIS — I10 ESSENTIAL HYPERTENSION: ICD-10-CM

## 2017-07-26 DIAGNOSIS — J44.9 CHRONIC OBSTRUCTIVE PULMONARY DISEASE, UNSPECIFIED COPD TYPE (HCC): ICD-10-CM

## 2017-07-26 DIAGNOSIS — I73.9 PERIPHERAL VASCULAR DISEASE, UNSPECIFIED (HCC): ICD-10-CM

## 2017-07-26 LAB
ALBUMIN SERPL-MCNC: 2.9 G/DL (ref 3.5–4.8)
ALP LIVER SERPL-CCNC: 120 U/L (ref 45–117)
ALT SERPL-CCNC: 28 U/L (ref 17–63)
AST SERPL-CCNC: 23 U/L (ref 15–41)
BASOPHILS # BLD AUTO: 0.04 X10(3) UL (ref 0–0.1)
BASOPHILS NFR BLD AUTO: 0.9 %
BILIRUB SERPL-MCNC: 0.5 MG/DL (ref 0.1–2)
BUN BLD-MCNC: 25 MG/DL (ref 8–20)
CALCIUM BLD-MCNC: 9.7 MG/DL (ref 8.3–10.3)
CHLORIDE: 97 MMOL/L (ref 101–111)
CO2: 32 MMOL/L (ref 22–32)
CREAT BLD-MCNC: 1.06 MG/DL (ref 0.7–1.3)
EOSINOPHIL # BLD AUTO: 0.21 X10(3) UL (ref 0–0.3)
EOSINOPHIL NFR BLD AUTO: 4.7 %
ERYTHROCYTE [DISTWIDTH] IN BLOOD BY AUTOMATED COUNT: 15.3 % (ref 11.5–16)
EST. AVERAGE GLUCOSE BLD GHB EST-MCNC: 131 MG/DL (ref 68–126)
GLUCOSE BLD-MCNC: 126 MG/DL (ref 70–99)
HAV IGM SER QL: 2.2 MG/DL (ref 1.7–3)
HBA1C MFR BLD HPLC: 6.2 % (ref ?–5.7)
HCT VFR BLD AUTO: 37.2 % (ref 37–53)
HGB BLD-MCNC: 12.1 G/DL (ref 13–17)
IMMATURE GRANULOCYTE COUNT: 0.04 X10(3) UL (ref 0–1)
IMMATURE GRANULOCYTE RATIO %: 0.9 %
LYMPHOCYTES # BLD AUTO: 0.5 X10(3) UL (ref 0.9–4)
LYMPHOCYTES NFR BLD AUTO: 11.2 %
M PROTEIN MFR SERPL ELPH: 7.1 G/DL (ref 6.1–8.3)
MCH RBC QN AUTO: 28.1 PG (ref 27–33.2)
MCHC RBC AUTO-ENTMCNC: 32.5 G/DL (ref 31–37)
MCV RBC AUTO: 86.5 FL (ref 80–99)
MONOCYTES # BLD AUTO: 0.4 X10(3) UL (ref 0.1–0.6)
MONOCYTES NFR BLD AUTO: 9 %
NEUTROPHIL ABS PRELIM: 3.27 X10 (3) UL (ref 1.3–6.7)
NEUTROPHILS # BLD AUTO: 3.27 X10(3) UL (ref 1.3–6.7)
NEUTROPHILS NFR BLD AUTO: 73.3 %
PLATELET # BLD AUTO: 166 10(3)UL (ref 150–450)
POTASSIUM SERPL-SCNC: 4.5 MMOL/L (ref 3.6–5.1)
RBC # BLD AUTO: 4.3 X10(6)UL (ref 3.8–5.8)
RED CELL DISTRIBUTION WIDTH-SD: 48.5 FL (ref 35.1–46.3)
SODIUM SERPL-SCNC: 136 MMOL/L (ref 136–144)
WBC # BLD AUTO: 4.5 X10(3) UL (ref 4–13)

## 2017-07-26 PROCEDURE — 80053 COMPREHEN METABOLIC PANEL: CPT

## 2017-07-26 PROCEDURE — 99306 1ST NF CARE HIGH MDM 50: CPT | Performed by: FAMILY MEDICINE

## 2017-07-26 PROCEDURE — 83036 HEMOGLOBIN GLYCOSYLATED A1C: CPT

## 2017-07-26 PROCEDURE — 99310 SBSQ NF CARE HIGH MDM 45: CPT | Performed by: NURSE PRACTITIONER

## 2017-07-26 PROCEDURE — 83735 ASSAY OF MAGNESIUM: CPT

## 2017-07-26 PROCEDURE — 36415 COLL VENOUS BLD VENIPUNCTURE: CPT

## 2017-07-26 PROCEDURE — 85025 COMPLETE CBC W/AUTO DIFF WBC: CPT

## 2017-07-26 RX ORDER — COLCHICINE 0.6 MG/1
0.6 TABLET ORAL DAILY
COMMUNITY
End: 2017-08-10 | Stop reason: ALTCHOICE

## 2017-07-26 RX ORDER — INDOMETHACIN 25 MG/1
25 CAPSULE ORAL
COMMUNITY
Start: 2017-07-25 | End: 2017-08-10

## 2017-07-26 RX ORDER — TORSEMIDE 20 MG/1
40 TABLET ORAL DAILY
COMMUNITY
End: 2017-11-16

## 2017-07-26 RX ORDER — ACETAMINOPHEN 325 MG/1
650 TABLET ORAL EVERY 4 HOURS PRN
COMMUNITY
End: 2017-08-10 | Stop reason: ALTCHOICE

## 2017-07-26 RX ORDER — TAMSULOSIN HYDROCHLORIDE 0.4 MG/1
CAPSULE ORAL DAILY
COMMUNITY
End: 2017-10-10

## 2017-07-26 RX ORDER — PANTOPRAZOLE SODIUM 40 MG/1
40 TABLET, DELAYED RELEASE ORAL
COMMUNITY
End: 2017-10-10

## 2017-07-27 ENCOUNTER — MED REC SCAN ONLY (OUTPATIENT)
Dept: FAMILY MEDICINE CLINIC | Facility: CLINIC | Age: 77
End: 2017-07-27

## 2017-07-27 NOTE — PROGRESS NOTES
Chang Torres  : 1940  Age 69year old  male patient is admitted to Facility: [place] [address] for [facility] s/p pericardiectomy and repair of periprosthetic AVR leak.     [facility] Admit date:    17  Discharge date to [facility] increased PVR. Initiated on flomax. States he is voiding w/o difficulty. Denies urinary sxs. Will attempt to DC flomax once stronger and prior to DC.      Admitted from   Past Medical History:   Diagnosis Date   • Aortic stenosis    • Aortic valve repla Alcohol use: Yes           8.4 oz/week     Glasses of wine: 14 per week      ALLERGIES:    Radiology Contrast *    Tightness in Throat    Comment:Pt states he stopped breathing    CODE STATUS:  Full Code    ADVANCED CARE PLANNING TEAM: None      MARCELINA 3       VITALS:  /71   Pulse 65   Resp 20   SpO2 97%      REVIEW OF SYSTEMS:  GENERAL HEALTH:feels well otherwise  SKIN: denies any unusual skin lesions or rashes  WOUNDS: denies   EYES:no visual complaints or deficits  HENT: denies nasal congestion, click, no murmur  ABDOMEN:  normal active BS+, soft, distended; no organomegaly, no masses; no bruits; nontender, no guarding, no rebound tenderness.   :Deferred  LYMPHATIC:no lymphedema  MUSCULOSKELETAL: no acute synovitis upper or lower extremity  EXTRE indomethacin    Acute on chronic diastolic CHF/HTN/HL/CAD/PVD  1. VS q shift  2. Daily wt; notify if increased 2 lb in 24 hrs or 5 lb in one wk  3. Low Na and cardiac diet  4. 2,000cc fluid restriction  5. Atorvastatin 40 mg q HS  6.  Ramipril 2.5 mg daily

## 2017-07-28 ENCOUNTER — PATIENT MESSAGE (OUTPATIENT)
Dept: FAMILY MEDICINE CLINIC | Facility: CLINIC | Age: 77
End: 2017-07-28

## 2017-07-31 ENCOUNTER — SNF VISIT (OUTPATIENT)
Dept: INTERNAL MEDICINE CLINIC | Age: 77
End: 2017-07-31

## 2017-07-31 VITALS
SYSTOLIC BLOOD PRESSURE: 113 MMHG | OXYGEN SATURATION: 98 % | HEART RATE: 78 BPM | TEMPERATURE: 99 F | RESPIRATION RATE: 20 BRPM | DIASTOLIC BLOOD PRESSURE: 58 MMHG | WEIGHT: 188.63 LBS | BODY MASS INDEX: 25 KG/M2

## 2017-07-31 DIAGNOSIS — Z95.2 S/P AVR (AORTIC VALVE REPLACEMENT): ICD-10-CM

## 2017-07-31 DIAGNOSIS — I31.8 RESTRICTIVE PERICARDITIS: Primary | ICD-10-CM

## 2017-07-31 DIAGNOSIS — E11.8 CONTROLLED TYPE 2 DIABETES MELLITUS WITH COMPLICATION, WITHOUT LONG-TERM CURRENT USE OF INSULIN (HCC): ICD-10-CM

## 2017-07-31 DIAGNOSIS — K59.00 CONSTIPATION, UNSPECIFIED CONSTIPATION TYPE: ICD-10-CM

## 2017-07-31 DIAGNOSIS — I35.1 AORTIC VALVE REGURGITATION, UNSPECIFIED ETIOLOGY: ICD-10-CM

## 2017-07-31 PROCEDURE — 99309 SBSQ NF CARE MODERATE MDM 30: CPT | Performed by: NURSE PRACTITIONER

## 2017-07-31 NOTE — PROGRESS NOTES
Jonas Orourke, 1/28/1940, 69year old, male    Chief Complaint:  Patient presents with:   Follow - Up: s/p pericardiectomy and repair of periprosthetic AVR leak  Constipation       Subjective:  PMH significant for severe AS s/p AVR, constrictive pericard commands  PSYCHIATRIC: alert and oriented x 3; affect appropriate    Medications reviewed: Yes    Diagnostics reviewed: Accu check readings reviewed:  Fasting range ; PP range 123-220.     Assessment and plan:  Severe aortic stenosis s/p AVR w/ mod ARTHUR  7/31/2017  12:30 PM

## 2017-07-31 NOTE — TELEPHONE ENCOUNTER
I am not on staff at [facility]. The patient should have the option to refuse the antidepressant. Have him finish the antibiotic as he was directed.

## 2017-07-31 NOTE — TELEPHONE ENCOUNTER
From: Terrance Wooten  To: Jena Beltrán MD  Sent: 7/28/2017 5:09 PM CDT  Subject: Non-Urgent Medical Question    As of Tuesday, July 25th, Dung Saucedo is now back from [facility] after a valve repair on his heart, and have checked into [facility] for ph

## 2017-08-02 ENCOUNTER — LAB ENCOUNTER (OUTPATIENT)
Dept: LAB | Age: 77
End: 2017-08-02
Attending: FAMILY MEDICINE
Payer: COMMERCIAL

## 2017-08-02 ENCOUNTER — MED REC SCAN ONLY (OUTPATIENT)
Dept: FAMILY MEDICINE CLINIC | Facility: CLINIC | Age: 77
End: 2017-08-02

## 2017-08-02 DIAGNOSIS — Z95.2 AORTIC VALVE REPLACED: ICD-10-CM

## 2017-08-02 DIAGNOSIS — I10 HTN (HYPERTENSION): Primary | ICD-10-CM

## 2017-08-02 DIAGNOSIS — E11.8 CONTROLLED TYPE 2 DIABETES MELLITUS WITH COMPLICATION, WITHOUT LONG-TERM CURRENT USE OF INSULIN (HCC): ICD-10-CM

## 2017-08-02 LAB
ALBUMIN SERPL-MCNC: 2.6 G/DL (ref 3.5–4.8)
ALP LIVER SERPL-CCNC: 120 U/L (ref 45–117)
ALT SERPL-CCNC: 23 U/L (ref 17–63)
AST SERPL-CCNC: 18 U/L (ref 15–41)
BASOPHILS # BLD AUTO: 0.02 X10(3) UL (ref 0–0.1)
BASOPHILS NFR BLD AUTO: 0.5 %
BILIRUB SERPL-MCNC: 0.4 MG/DL (ref 0.1–2)
BUN BLD-MCNC: 15 MG/DL (ref 8–20)
CALCIUM BLD-MCNC: 9.4 MG/DL (ref 8.3–10.3)
CHLORIDE: 100 MMOL/L (ref 101–111)
CO2: 33 MMOL/L (ref 22–32)
CREAT BLD-MCNC: 1.12 MG/DL (ref 0.7–1.3)
EOSINOPHIL # BLD AUTO: 0.13 X10(3) UL (ref 0–0.3)
EOSINOPHIL NFR BLD AUTO: 3.3 %
ERYTHROCYTE [DISTWIDTH] IN BLOOD BY AUTOMATED COUNT: 15.8 % (ref 11.5–16)
EST. AVERAGE GLUCOSE BLD GHB EST-MCNC: 131 MG/DL (ref 68–126)
GLUCOSE BLD-MCNC: 117 MG/DL (ref 70–99)
HBA1C MFR BLD HPLC: 6.2 % (ref ?–5.7)
HCT VFR BLD AUTO: 35 % (ref 37–53)
HGB BLD-MCNC: 11 G/DL (ref 13–17)
IMMATURE GRANULOCYTE COUNT: 0.02 X10(3) UL (ref 0–1)
IMMATURE GRANULOCYTE RATIO %: 0.5 %
LYMPHOCYTES # BLD AUTO: 0.39 X10(3) UL (ref 0.9–4)
LYMPHOCYTES NFR BLD AUTO: 9.9 %
M PROTEIN MFR SERPL ELPH: 6.7 G/DL (ref 6.1–8.3)
MCH RBC QN AUTO: 28.1 PG (ref 27–33.2)
MCHC RBC AUTO-ENTMCNC: 31.4 G/DL (ref 31–37)
MCV RBC AUTO: 89.3 FL (ref 80–99)
MONOCYTES # BLD AUTO: 0.41 X10(3) UL (ref 0.1–0.6)
MONOCYTES NFR BLD AUTO: 10.4 %
NEUTROPHIL ABS PRELIM: 2.98 X10 (3) UL (ref 1.3–6.7)
NEUTROPHILS # BLD AUTO: 2.98 X10(3) UL (ref 1.3–6.7)
NEUTROPHILS NFR BLD AUTO: 75.4 %
PLATELET # BLD AUTO: 140 10(3)UL (ref 150–450)
POTASSIUM SERPL-SCNC: 4.2 MMOL/L (ref 3.6–5.1)
RBC # BLD AUTO: 3.92 X10(6)UL (ref 3.8–5.8)
RED CELL DISTRIBUTION WIDTH-SD: 51.9 FL (ref 35.1–46.3)
SODIUM SERPL-SCNC: 138 MMOL/L (ref 136–144)
WBC # BLD AUTO: 4 X10(3) UL (ref 4–13)

## 2017-08-02 PROCEDURE — 83036 HEMOGLOBIN GLYCOSYLATED A1C: CPT

## 2017-08-02 PROCEDURE — 80053 COMPREHEN METABOLIC PANEL: CPT

## 2017-08-02 PROCEDURE — 36415 COLL VENOUS BLD VENIPUNCTURE: CPT

## 2017-08-02 PROCEDURE — 85025 COMPLETE CBC W/AUTO DIFF WBC: CPT

## 2017-08-03 ENCOUNTER — SNF VISIT (OUTPATIENT)
Dept: INTERNAL MEDICINE CLINIC | Age: 77
End: 2017-08-03

## 2017-08-03 VITALS
RESPIRATION RATE: 24 BRPM | SYSTOLIC BLOOD PRESSURE: 115 MMHG | DIASTOLIC BLOOD PRESSURE: 66 MMHG | BODY MASS INDEX: 25 KG/M2 | OXYGEN SATURATION: 92 % | HEART RATE: 67 BPM | WEIGHT: 192 LBS

## 2017-08-03 DIAGNOSIS — I31.8 RESTRICTIVE PERICARDITIS: ICD-10-CM

## 2017-08-03 DIAGNOSIS — Z95.2 S/P AVR (AORTIC VALVE REPLACEMENT): Primary | ICD-10-CM

## 2017-08-03 DIAGNOSIS — I50.9 CONGESTIVE HEART FAILURE, UNSPECIFIED CONGESTIVE HEART FAILURE CHRONICITY, UNSPECIFIED CONGESTIVE HEART FAILURE TYPE: ICD-10-CM

## 2017-08-03 PROCEDURE — 99309 SBSQ NF CARE MODERATE MDM 30: CPT | Performed by: NURSE PRACTITIONER

## 2017-08-03 RX ORDER — SPIRONOLACTONE 25 MG/1
25 TABLET ORAL 2 TIMES DAILY
COMMUNITY
Start: 2017-08-03 | End: 2017-08-05

## 2017-08-03 NOTE — PROGRESS NOTES
Esha aRsmussen, 1/28/1940, 69year old, male    Chief Complaint:  Patient presents with:  Shortness Of Breath  Weight Loss Gain (metabolic)       Subjective:  PMH significant for severe AS s/p AVR, constrictive pericarditis, Diastolic CHF, HTN, HL, CAD, commands  PSYCHIATRIC: alert and oriented x 3; affect appropriate    Medications reviewed: Yes    Diagnostics reviewed:    Daily wts reviewed: Wt up 8 lbs since admit to [facility]. Up 3.4 lb in past 48 hrs.       Lab Results  Component Value Date    (H) q am and 1 mg q pm  4. Gabapentin 900 mg BID     Chronic respiratory failure 2/2 SEBLE/COPD  1. O2 sat q shift; wean as tolerated  2. RT to follow  3. CPAP at noc w/ O2 bleed in at 1 LPM  4. ProAir 108 mcg/act; 2 puffs q 6 hrs prn  5.  Spiriva respimat 2.5 mc

## 2017-08-04 ENCOUNTER — APPOINTMENT (OUTPATIENT)
Dept: CT IMAGING | Facility: HOSPITAL | Age: 77
End: 2017-08-04
Attending: EMERGENCY MEDICINE
Payer: MEDICARE

## 2017-08-04 ENCOUNTER — APPOINTMENT (OUTPATIENT)
Dept: GENERAL RADIOLOGY | Facility: HOSPITAL | Age: 77
End: 2017-08-04
Attending: EMERGENCY MEDICINE
Payer: MEDICARE

## 2017-08-04 ENCOUNTER — HOSPITAL ENCOUNTER (EMERGENCY)
Facility: HOSPITAL | Age: 77
Discharge: HOME | End: 2017-08-04
Attending: EMERGENCY MEDICINE
Payer: MEDICARE

## 2017-08-04 ENCOUNTER — SNF VISIT (OUTPATIENT)
Dept: INTERNAL MEDICINE CLINIC | Age: 77
End: 2017-08-04

## 2017-08-04 VITALS
OXYGEN SATURATION: 89 % | SYSTOLIC BLOOD PRESSURE: 103 MMHG | WEIGHT: 193.63 LBS | HEART RATE: 97 BPM | TEMPERATURE: 98 F | BODY MASS INDEX: 26 KG/M2 | DIASTOLIC BLOOD PRESSURE: 67 MMHG | RESPIRATION RATE: 20 BRPM

## 2017-08-04 VITALS
WEIGHT: 193 LBS | SYSTOLIC BLOOD PRESSURE: 115 MMHG | TEMPERATURE: 97 F | RESPIRATION RATE: 22 BRPM | HEART RATE: 72 BPM | OXYGEN SATURATION: 99 % | HEIGHT: 73 IN | BODY MASS INDEX: 25.58 KG/M2 | DIASTOLIC BLOOD PRESSURE: 75 MMHG

## 2017-08-04 DIAGNOSIS — R09.02 HYPOXIA: ICD-10-CM

## 2017-08-04 DIAGNOSIS — J90 PLEURAL EFFUSION: ICD-10-CM

## 2017-08-04 DIAGNOSIS — R10.84 GENERALIZED ABDOMINAL PAIN: ICD-10-CM

## 2017-08-04 DIAGNOSIS — R14.0 ABDOMINAL BLOATING: Primary | ICD-10-CM

## 2017-08-04 DIAGNOSIS — R06.02 SOB (SHORTNESS OF BREATH): ICD-10-CM

## 2017-08-04 DIAGNOSIS — Z95.2 S/P AVR (AORTIC VALVE REPLACEMENT): Primary | ICD-10-CM

## 2017-08-04 DIAGNOSIS — I31.8 RESTRICTIVE PERICARDITIS: ICD-10-CM

## 2017-08-04 LAB
ALBUMIN SERPL-MCNC: 2.7 G/DL (ref 3.5–4.8)
ALP LIVER SERPL-CCNC: 129 U/L (ref 45–117)
ALT SERPL-CCNC: 20 U/L (ref 17–63)
APTT PPP: 39.4 SECONDS (ref 25–34)
AST SERPL-CCNC: 21 U/L (ref 15–41)
ATRIAL RATE: 72 BPM
BASOPHILS # BLD AUTO: 0.02 X10(3) UL (ref 0–0.1)
BASOPHILS NFR BLD AUTO: 0.5 %
BILIRUB SERPL-MCNC: 0.3 MG/DL (ref 0.1–2)
BILIRUB UR QL STRIP.AUTO: NEGATIVE
BUN BLD-MCNC: 18 MG/DL (ref 8–20)
CALCIUM BLD-MCNC: 9.4 MG/DL (ref 8.3–10.3)
CHLORIDE: 101 MMOL/L (ref 101–111)
CLARITY UR REFRACT.AUTO: CLEAR
CO2: 31 MMOL/L (ref 22–32)
COLOR UR AUTO: YELLOW
CREAT BLD-MCNC: 1.05 MG/DL (ref 0.7–1.3)
EOSINOPHIL # BLD AUTO: 0.1 X10(3) UL (ref 0–0.3)
EOSINOPHIL NFR BLD AUTO: 2.5 %
ERYTHROCYTE [DISTWIDTH] IN BLOOD BY AUTOMATED COUNT: 15.9 % (ref 11.5–16)
GLUCOSE BLD-MCNC: 124 MG/DL (ref 70–99)
GLUCOSE UR STRIP.AUTO-MCNC: 150 MG/DL
HCT VFR BLD AUTO: 37.5 % (ref 37–53)
HGB BLD-MCNC: 11.8 G/DL (ref 13–17)
IMMATURE GRANULOCYTE COUNT: 0.02 X10(3) UL (ref 0–1)
IMMATURE GRANULOCYTE RATIO %: 0.5 %
INR BLD: 1.09 (ref 0.89–1.11)
KETONES UR STRIP.AUTO-MCNC: NEGATIVE MG/DL
LEUKOCYTE ESTERASE UR QL STRIP.AUTO: NEGATIVE
LYMPHOCYTES # BLD AUTO: 0.37 X10(3) UL (ref 0.9–4)
LYMPHOCYTES NFR BLD AUTO: 9.2 %
M PROTEIN MFR SERPL ELPH: 7.3 G/DL (ref 6.1–8.3)
MCH RBC QN AUTO: 27.9 PG (ref 27–33.2)
MCHC RBC AUTO-ENTMCNC: 31.5 G/DL (ref 31–37)
MCV RBC AUTO: 88.7 FL (ref 80–99)
MONOCYTES # BLD AUTO: 0.36 X10(3) UL (ref 0.1–0.6)
MONOCYTES NFR BLD AUTO: 8.9 %
NEUTROPHIL ABS PRELIM: 3.17 X10 (3) UL (ref 1.3–6.7)
NEUTROPHILS # BLD AUTO: 3.17 X10(3) UL (ref 1.3–6.7)
NEUTROPHILS NFR BLD AUTO: 78.4 %
NITRITE UR QL STRIP.AUTO: NEGATIVE
P AXIS: 57 DEGREES
P-R INTERVAL: 208 MS
PH UR STRIP.AUTO: 7 [PH] (ref 4.5–8)
PLATELET # BLD AUTO: 160 10(3)UL (ref 150–450)
POTASSIUM SERPL-SCNC: 4.2 MMOL/L (ref 3.6–5.1)
PRO-BETA NATRIURETIC PEPTIDE: 612 PG/ML (ref ?–450)
PROT UR STRIP.AUTO-MCNC: NEGATIVE MG/DL
PSA SERPL DL<=0.01 NG/ML-MCNC: 14.1 SECONDS (ref 12–14.3)
Q-T INTERVAL: 414 MS
QRS DURATION: 156 MS
QTC CALCULATION (BEZET): 453 MS
R AXIS: -43 DEGREES
RBC # BLD AUTO: 4.23 X10(6)UL (ref 3.8–5.8)
RBC UR QL AUTO: NEGATIVE
RED CELL DISTRIBUTION WIDTH-SD: 51.6 FL (ref 35.1–46.3)
SODIUM SERPL-SCNC: 138 MMOL/L (ref 136–144)
SP GR UR STRIP.AUTO: 1.01 (ref 1–1.03)
T AXIS: -2 DEGREES
TROPONIN: <0.046 NG/ML (ref ?–0.05)
UROBILINOGEN UR STRIP.AUTO-MCNC: <2 MG/DL
VENTRICULAR RATE: 72 BPM
WBC # BLD AUTO: 4 X10(3) UL (ref 4–13)

## 2017-08-04 PROCEDURE — 80053 COMPREHEN METABOLIC PANEL: CPT | Performed by: EMERGENCY MEDICINE

## 2017-08-04 PROCEDURE — 36415 COLL VENOUS BLD VENIPUNCTURE: CPT

## 2017-08-04 PROCEDURE — 85025 COMPLETE CBC W/AUTO DIFF WBC: CPT | Performed by: EMERGENCY MEDICINE

## 2017-08-04 PROCEDURE — 71010 XR CHEST AP PORTABLE  (CPT=71010): CPT | Performed by: EMERGENCY MEDICINE

## 2017-08-04 PROCEDURE — 99310 SBSQ NF CARE HIGH MDM 45: CPT | Performed by: NURSE PRACTITIONER

## 2017-08-04 PROCEDURE — 84484 ASSAY OF TROPONIN QUANT: CPT | Performed by: EMERGENCY MEDICINE

## 2017-08-04 PROCEDURE — 83880 ASSAY OF NATRIURETIC PEPTIDE: CPT | Performed by: EMERGENCY MEDICINE

## 2017-08-04 PROCEDURE — 99285 EMERGENCY DEPT VISIT HI MDM: CPT

## 2017-08-04 PROCEDURE — 93005 ELECTROCARDIOGRAM TRACING: CPT

## 2017-08-04 PROCEDURE — 74176 CT ABD & PELVIS W/O CONTRAST: CPT | Performed by: EMERGENCY MEDICINE

## 2017-08-04 PROCEDURE — 93010 ELECTROCARDIOGRAM REPORT: CPT

## 2017-08-04 PROCEDURE — 85730 THROMBOPLASTIN TIME PARTIAL: CPT | Performed by: EMERGENCY MEDICINE

## 2017-08-04 PROCEDURE — 81003 URINALYSIS AUTO W/O SCOPE: CPT | Performed by: EMERGENCY MEDICINE

## 2017-08-04 PROCEDURE — 85610 PROTHROMBIN TIME: CPT | Performed by: EMERGENCY MEDICINE

## 2017-08-04 NOTE — PROGRESS NOTES
Michelle Luis, 1/28/1940, 69year old, male    Chief Complaint:  Patient presents with:  Shortness Of Breath  Dyspnea MARQUES SOB (respiratory)  Abdominal Pain:  w/ distention       Subjective:  PMH significant for severe AS s/p AVR, constrictive pericardit click, no murmur; heart tones distant  ABDOMEN:  normal active BS+, firm, distended; no organomegaly, no masses; no bruits; nontender, no guarding, no rebound tenderness.   :Deferred  LYMPHATIC:no lymphedema  MUSCULOSKELETAL: no acute synovitis upper or l Natalia/pulm prn     Weakness/deconditioning  1. PT/OT   2. ELOS ~14 days--may require longer pending progress  3. Plan DC on or before 8.8.17     Wt loss in setting of presumed cardiac cachexia  1. Monitor wts  2.  Dietician consulted to recommend supplement

## 2017-08-04 NOTE — ED INITIAL ASSESSMENT (HPI)
Pt with COPD and heart disease who uses O2 sent form [facility] with c/o abdominal swelling since last night with 9lb weight gain since yesterday.    In [facility] care for rehab

## 2017-08-07 ENCOUNTER — SNF DISCHARGE (OUTPATIENT)
Dept: INTERNAL MEDICINE CLINIC | Age: 77
End: 2017-08-07

## 2017-08-07 VITALS
DIASTOLIC BLOOD PRESSURE: 76 MMHG | BODY MASS INDEX: 25 KG/M2 | OXYGEN SATURATION: 97 % | HEART RATE: 77 BPM | WEIGHT: 191.19 LBS | TEMPERATURE: 98 F | SYSTOLIC BLOOD PRESSURE: 128 MMHG | RESPIRATION RATE: 18 BRPM

## 2017-08-07 DIAGNOSIS — Z95.2 S/P AVR (AORTIC VALVE REPLACEMENT): Primary | ICD-10-CM

## 2017-08-07 DIAGNOSIS — R09.02 HYPOXIA: ICD-10-CM

## 2017-08-07 DIAGNOSIS — G47.33 OSA ON CPAP: ICD-10-CM

## 2017-08-07 DIAGNOSIS — R63.4 WEIGHT LOSS, UNINTENTIONAL: ICD-10-CM

## 2017-08-07 DIAGNOSIS — J90 PLEURAL EFFUSION: ICD-10-CM

## 2017-08-07 DIAGNOSIS — E78.00 PURE HYPERCHOLESTEROLEMIA: ICD-10-CM

## 2017-08-07 DIAGNOSIS — I73.9 PERIPHERAL VASCULAR DISEASE, UNSPECIFIED (HCC): ICD-10-CM

## 2017-08-07 DIAGNOSIS — E11.8 CONTROLLED TYPE 2 DIABETES MELLITUS WITH COMPLICATION, WITHOUT LONG-TERM CURRENT USE OF INSULIN (HCC): ICD-10-CM

## 2017-08-07 DIAGNOSIS — K59.00 CONSTIPATION, UNSPECIFIED CONSTIPATION TYPE: ICD-10-CM

## 2017-08-07 DIAGNOSIS — I31.8 RESTRICTIVE PERICARDITIS: ICD-10-CM

## 2017-08-07 DIAGNOSIS — I50.9 CONGESTIVE HEART FAILURE, UNSPECIFIED CONGESTIVE HEART FAILURE CHRONICITY, UNSPECIFIED CONGESTIVE HEART FAILURE TYPE: ICD-10-CM

## 2017-08-07 DIAGNOSIS — I10 ESSENTIAL HYPERTENSION: ICD-10-CM

## 2017-08-07 DIAGNOSIS — J44.9 CHRONIC OBSTRUCTIVE PULMONARY DISEASE, UNSPECIFIED COPD TYPE (HCC): ICD-10-CM

## 2017-08-07 DIAGNOSIS — R33.9 URINARY RETENTION: ICD-10-CM

## 2017-08-07 DIAGNOSIS — Z99.89 OSA ON CPAP: ICD-10-CM

## 2017-08-07 PROCEDURE — 99316 NF DSCHRG MGMT 30 MIN+: CPT | Performed by: NURSE PRACTITIONER

## 2017-08-07 NOTE — DISCHARGE SUMMARY
[facility]  Discharge Summary    Admit date: 7/7/2017    Discharge date and time: 7/18/2017  1:00 PM   Admitted: 07/07/2017  Discharged: 07/18/2017    DISCHARGE SUMMARY    DISCHARGE DIAGNOSES:  1.  Chronic heart failure with preserved EF/valvular heart

## 2017-08-08 NOTE — PROGRESS NOTES
Toño Colorado, 1/28/1940, 69year old, male is being discharged from Facility: [facility]    Date of Admission:  7.25.17    Date of Discharge:  Anticipated on 8.8.17                                 Admitting Diagnose denies excessive thirst or urination; denies unexpected wt gain or wt loss; +T2 DM w/ PN  ALLERGY/IMM.: denies food or seasonal allergies        PHYSICAL EXAM:  GENERAL HEALTH: well developed, well nourished, in no apparent distress;  LINES, TUBES, DRAINS: GFRNAA 41 (L) 11/18/2015   GFRAA 47 (L) 11/18/2015   CA 9.4 08/04/2017   ALKPHO 129 (H) 08/04/2017   AST 21 08/04/2017   ALT 20 08/04/2017   BILT 0.3 08/04/2017   TP 7.3 08/04/2017   ALB 2.7 (L) 08/04/2017   GLOBULT 3.4 11/18/2015   ALBGLOBRAT 1.2 11/18/ 30cc daily prn     Constrictive pericarditis  1. Colcrys 0.6 mg daily  2. Indomethacin 25 mg TID until 8.24.17  3. Pantoprazole 40 mg daily/prophylaxis while on indomethacin     Chronic pleural effusions  1. Monitor for sxs     T2 DM w/ PN  1.  CHO controll

## 2017-08-09 ENCOUNTER — LAB ENCOUNTER (OUTPATIENT)
Dept: LAB | Age: 77
End: 2017-08-09
Attending: FAMILY MEDICINE
Payer: COMMERCIAL

## 2017-08-09 ENCOUNTER — PATIENT OUTREACH (OUTPATIENT)
Dept: CASE MANAGEMENT | Age: 77
End: 2017-08-09

## 2017-08-09 DIAGNOSIS — Z02.9 ENCOUNTERS FOR ADMINISTRATIVE PURPOSE: ICD-10-CM

## 2017-08-09 DIAGNOSIS — I10 HTN (HYPERTENSION): Primary | ICD-10-CM

## 2017-08-09 NOTE — PROGRESS NOTES
Initial Post Discharge Follow Up   Discharge Date: 8/8/17--d/c from [facility]. Contact Date: 8/9/2017    Consent Verification:  Assessment Completed With: Patient  HIPAA Verified?   Yes    Discharge Dx:  Pneumonia, s/p AVR and periprosthetic with breakfast. Disp:  Rfl:    glimepiride 2 MG Oral Tab Take 1 mg by mouth every evening. Disp:  Rfl:    gabapentin 300 MG Oral Cap Take 900 mg by mouth 2 (two) times daily.  Disp:  Rfl:    SYMBICORT 160-4.5 MCG/ACT Inhalation Aerosol Inhale 1 puff into th Now that you are home, are there any needs or concerns you need addressed before your next visit with your PCP?  (DME, meds, disease concerns, Etc): No     Follow up appointments:  HFU appt reviewed with pt as scheduled below.   He states he also has an a

## 2017-08-10 ENCOUNTER — TELEPHONE (OUTPATIENT)
Dept: FAMILY MEDICINE CLINIC | Facility: CLINIC | Age: 77
End: 2017-08-10

## 2017-08-10 ENCOUNTER — OFFICE VISIT (OUTPATIENT)
Dept: FAMILY MEDICINE CLINIC | Facility: CLINIC | Age: 77
End: 2017-08-10

## 2017-08-10 VITALS
OXYGEN SATURATION: 98 % | SYSTOLIC BLOOD PRESSURE: 104 MMHG | DIASTOLIC BLOOD PRESSURE: 62 MMHG | RESPIRATION RATE: 24 BRPM | HEART RATE: 72 BPM | TEMPERATURE: 98 F | BODY MASS INDEX: 26 KG/M2 | WEIGHT: 194 LBS

## 2017-08-10 DIAGNOSIS — I31.8 RESTRICTIVE PERICARDITIS: ICD-10-CM

## 2017-08-10 DIAGNOSIS — E11.42 DIABETIC POLYNEUROPATHY ASSOCIATED WITH TYPE 2 DIABETES MELLITUS (HCC): ICD-10-CM

## 2017-08-10 DIAGNOSIS — J90 PLEURAL EFFUSION: ICD-10-CM

## 2017-08-10 DIAGNOSIS — I31.8 RESTRICTIVE PERICARDITIS: Primary | ICD-10-CM

## 2017-08-10 DIAGNOSIS — Z95.2 S/P AVR (AORTIC VALVE REPLACEMENT): ICD-10-CM

## 2017-08-10 DIAGNOSIS — E11.8 CONTROLLED TYPE 2 DIABETES MELLITUS WITH COMPLICATION, WITHOUT LONG-TERM CURRENT USE OF INSULIN (HCC): ICD-10-CM

## 2017-08-10 DIAGNOSIS — G47.33 OSA ON CPAP: ICD-10-CM

## 2017-08-10 DIAGNOSIS — E78.00 PURE HYPERCHOLESTEROLEMIA: ICD-10-CM

## 2017-08-10 DIAGNOSIS — I27.20 MILD PULMONARY HYPERTENSION (HCC): ICD-10-CM

## 2017-08-10 DIAGNOSIS — I35.1 AORTIC VALVE REGURGITATION, UNSPECIFIED ETIOLOGY: ICD-10-CM

## 2017-08-10 DIAGNOSIS — I50.42 CHRONIC COMBINED SYSTOLIC AND DIASTOLIC CONGESTIVE HEART FAILURE (HCC): ICD-10-CM

## 2017-08-10 DIAGNOSIS — J44.9 CHRONIC OBSTRUCTIVE PULMONARY DISEASE, UNSPECIFIED COPD TYPE (HCC): ICD-10-CM

## 2017-08-10 DIAGNOSIS — Z99.89 OSA ON CPAP: ICD-10-CM

## 2017-08-10 DIAGNOSIS — I10 ESSENTIAL HYPERTENSION: ICD-10-CM

## 2017-08-10 PROCEDURE — 99496 TRANSJ CARE MGMT HIGH F2F 7D: CPT | Performed by: FAMILY MEDICINE

## 2017-08-10 RX ORDER — INDOMETHACIN 25 MG/1
25 CAPSULE ORAL
Qty: 90 CAPSULE | Refills: 1 | Status: SHIPPED | OUTPATIENT
Start: 2017-08-10 | End: 2017-08-10

## 2017-08-10 NOTE — TELEPHONE ENCOUNTER
Request for a PA for patients Indomethacin 25 mg po TID with meals. PA completed and sent to plan via covermymeds.

## 2017-08-10 NOTE — PROGRESS NOTES
HPI:    Kathy Johnson is a 69year old male here today for hospital follow up.    He was discharged from SNF, [address] [place] [address],[address] to Home   Admission Date: 7/7/17   Discharge Date: 7/18/17  [address] from 7/18-7/25/17  SNF admission: 7/25/17  SNF discharge: congestive heart failure. He has a history of COPD and obstructive sleep apnea. Patient was originally admitted to [facility] on 7/7/17 and subsequently airlifted to [facility] on 7/18/17.   There he underwent a periprosthetic Amplatz plug leak clos type of vessel, native or graft; Difficulty breathing; High blood pressure; High cholesterol; Hypercholesterolemia; Neuropathy ([facility] [address] Other and unspecified hyperlipidemia; Pericarditis; Renal disorder;  Shortness of breath; Sleep apnea; Type II or unspecifi 73\".    Weight as of this encounter: 194 lb.    /62 (BP Location: Left arm, Patient Position: Sitting, Cuff Size: adult)   Pulse 72   Temp 97.6 °F (36.4 °C)   Resp 24   Wt 194 lb   SpO2 98%   BMI 25.60 kg/m²    GENERAL: well developed, well nourished HEMOGLOBIN A1C; Future    Essential hypertension  -     COMP METABOLIC PANEL (14); Future    Pure hypercholesterolemia  -     COMP METABOLIC PANEL (14); Future  -     LIPID PANEL;  Future          Orders Placed This Encounter      CMP in 3 months      Grace Luna

## 2017-08-11 RX ORDER — INDOMETHACIN 25 MG/1
CAPSULE ORAL
Qty: 270 CAPSULE | Refills: 1 | Status: SHIPPED | OUTPATIENT
Start: 2017-08-11 | End: 2017-10-10

## 2017-08-15 NOTE — TELEPHONE ENCOUNTER
Per documentation in chart pt is seen at the [facility] for cardiology, there is no doctor listed or contact number. Called to pt to ask for information reaches unidentified vm. LM for pt to cb and discuss.

## 2017-08-18 ENCOUNTER — PRIOR ORIGINAL RECORDS (OUTPATIENT)
Dept: OTHER | Age: 77
End: 2017-08-18

## 2017-09-07 ENCOUNTER — PRIOR ORIGINAL RECORDS (OUTPATIENT)
Dept: OTHER | Age: 77
End: 2017-09-07

## 2017-09-11 ENCOUNTER — PRIOR ORIGINAL RECORDS (OUTPATIENT)
Dept: OTHER | Age: 77
End: 2017-09-11

## 2017-09-28 ENCOUNTER — RT VISIT (OUTPATIENT)
Dept: RESPIRATORY THERAPY | Facility: HOSPITAL | Age: 77
End: 2017-09-28
Attending: INTERNAL MEDICINE
Payer: MEDICARE

## 2017-09-28 DIAGNOSIS — R06.00 DYSPNEA: ICD-10-CM

## 2017-09-28 PROCEDURE — 94729 DIFFUSING CAPACITY: CPT

## 2017-09-28 PROCEDURE — 94010 BREATHING CAPACITY TEST: CPT

## 2017-09-28 PROCEDURE — 94726 PLETHYSMOGRAPHY LUNG VOLUMES: CPT

## 2017-09-29 NOTE — PROCEDURES
Giovanni Azul is a 55-year-old  male who stands 6 feet 1 inches tall and weighs 188 pounds. He underwent standard pulmonary function testing on 9/28/17. He carries a diagnosis of dyspnea.   He has a 100-pack-year smoking history but stopped smok

## 2017-10-10 ENCOUNTER — CARDPULM VISIT (OUTPATIENT)
Dept: CARDIAC REHAB | Facility: HOSPITAL | Age: 77
End: 2017-10-10
Attending: INTERNAL MEDICINE
Payer: MEDICARE

## 2017-10-10 VITALS
WEIGHT: 185 LBS | SYSTOLIC BLOOD PRESSURE: 134 MMHG | HEIGHT: 73 IN | OXYGEN SATURATION: 99 % | BODY MASS INDEX: 24.52 KG/M2 | DIASTOLIC BLOOD PRESSURE: 80 MMHG | HEART RATE: 72 BPM

## 2017-10-10 PROCEDURE — 93798 PHYS/QHP OP CAR RHAB W/ECG: CPT

## 2017-10-10 RX ORDER — COLCHICINE 0.6 MG/1
0.6 TABLET ORAL DAILY
COMMUNITY
End: 2017-11-16

## 2017-10-11 ENCOUNTER — CARDPULM VISIT (OUTPATIENT)
Dept: CARDIAC REHAB | Facility: HOSPITAL | Age: 77
End: 2017-10-11
Attending: INTERNAL MEDICINE
Payer: MEDICARE

## 2017-10-11 PROCEDURE — 93798 PHYS/QHP OP CAR RHAB W/ECG: CPT

## 2017-10-13 ENCOUNTER — CARDPULM VISIT (OUTPATIENT)
Dept: CARDIAC REHAB | Facility: HOSPITAL | Age: 77
End: 2017-10-13
Attending: INTERNAL MEDICINE
Payer: MEDICARE

## 2017-10-13 PROCEDURE — 93798 PHYS/QHP OP CAR RHAB W/ECG: CPT

## 2017-10-18 ENCOUNTER — CARDPULM VISIT (OUTPATIENT)
Dept: CARDIAC REHAB | Facility: HOSPITAL | Age: 77
End: 2017-10-18
Attending: INTERNAL MEDICINE
Payer: MEDICARE

## 2017-10-18 PROCEDURE — 93798 PHYS/QHP OP CAR RHAB W/ECG: CPT

## 2017-10-20 ENCOUNTER — CARDPULM VISIT (OUTPATIENT)
Dept: CARDIAC REHAB | Facility: HOSPITAL | Age: 77
End: 2017-10-20
Attending: INTERNAL MEDICINE
Payer: MEDICARE

## 2017-10-20 PROCEDURE — 93798 PHYS/QHP OP CAR RHAB W/ECG: CPT

## 2017-10-23 ENCOUNTER — CARDPULM VISIT (OUTPATIENT)
Dept: CARDIAC REHAB | Facility: HOSPITAL | Age: 77
End: 2017-10-23
Attending: INTERNAL MEDICINE
Payer: MEDICARE

## 2017-10-23 PROCEDURE — 93798 PHYS/QHP OP CAR RHAB W/ECG: CPT

## 2017-10-25 ENCOUNTER — CARDPULM VISIT (OUTPATIENT)
Dept: CARDIAC REHAB | Facility: HOSPITAL | Age: 77
End: 2017-10-25
Attending: INTERNAL MEDICINE
Payer: MEDICARE

## 2017-10-25 PROCEDURE — 93798 PHYS/QHP OP CAR RHAB W/ECG: CPT

## 2017-10-27 ENCOUNTER — CARDPULM VISIT (OUTPATIENT)
Dept: CARDIAC REHAB | Facility: HOSPITAL | Age: 77
End: 2017-10-27
Attending: INTERNAL MEDICINE
Payer: MEDICARE

## 2017-10-27 PROCEDURE — 93798 PHYS/QHP OP CAR RHAB W/ECG: CPT

## 2017-10-30 ENCOUNTER — CARDPULM VISIT (OUTPATIENT)
Dept: CARDIAC REHAB | Facility: HOSPITAL | Age: 77
End: 2017-10-30
Attending: INTERNAL MEDICINE
Payer: MEDICARE

## 2017-10-30 PROCEDURE — 93798 PHYS/QHP OP CAR RHAB W/ECG: CPT

## 2017-11-01 ENCOUNTER — CARDPULM VISIT (OUTPATIENT)
Dept: CARDIAC REHAB | Facility: HOSPITAL | Age: 77
End: 2017-11-01
Attending: INTERNAL MEDICINE
Payer: MEDICARE

## 2017-11-01 PROCEDURE — 93798 PHYS/QHP OP CAR RHAB W/ECG: CPT

## 2017-11-03 ENCOUNTER — CARDPULM VISIT (OUTPATIENT)
Dept: CARDIAC REHAB | Facility: HOSPITAL | Age: 77
End: 2017-11-03
Attending: INTERNAL MEDICINE
Payer: MEDICARE

## 2017-11-03 PROCEDURE — 93798 PHYS/QHP OP CAR RHAB W/ECG: CPT

## 2017-11-06 ENCOUNTER — CARDPULM VISIT (OUTPATIENT)
Dept: CARDIAC REHAB | Facility: HOSPITAL | Age: 77
End: 2017-11-06
Attending: INTERNAL MEDICINE
Payer: MEDICARE

## 2017-11-06 PROCEDURE — 93798 PHYS/QHP OP CAR RHAB W/ECG: CPT

## 2017-11-08 ENCOUNTER — CARDPULM VISIT (OUTPATIENT)
Dept: CARDIAC REHAB | Facility: HOSPITAL | Age: 77
End: 2017-11-08
Attending: INTERNAL MEDICINE
Payer: MEDICARE

## 2017-11-08 DIAGNOSIS — E11.42 DIABETIC POLYNEUROPATHY ASSOCIATED WITH TYPE 2 DIABETES MELLITUS (HCC): ICD-10-CM

## 2017-11-08 PROCEDURE — 93798 PHYS/QHP OP CAR RHAB W/ECG: CPT

## 2017-11-08 RX ORDER — GABAPENTIN 300 MG/1
CAPSULE ORAL
Qty: 540 CAPSULE | Refills: 0 | Status: ON HOLD | OUTPATIENT
Start: 2017-11-08 | End: 2017-12-11

## 2017-11-10 ENCOUNTER — CARDPULM VISIT (OUTPATIENT)
Dept: CARDIAC REHAB | Facility: HOSPITAL | Age: 77
End: 2017-11-10
Attending: INTERNAL MEDICINE
Payer: MEDICARE

## 2017-11-10 PROCEDURE — 93798 PHYS/QHP OP CAR RHAB W/ECG: CPT

## 2017-11-13 ENCOUNTER — CARDPULM VISIT (OUTPATIENT)
Dept: CARDIAC REHAB | Facility: HOSPITAL | Age: 77
End: 2017-11-13
Attending: INTERNAL MEDICINE
Payer: MEDICARE

## 2017-11-13 PROCEDURE — 93798 PHYS/QHP OP CAR RHAB W/ECG: CPT

## 2017-11-14 ENCOUNTER — LAB ENCOUNTER (OUTPATIENT)
Dept: LAB | Age: 77
End: 2017-11-14
Attending: FAMILY MEDICINE
Payer: MEDICARE

## 2017-11-14 ENCOUNTER — PRIOR ORIGINAL RECORDS (OUTPATIENT)
Dept: OTHER | Age: 77
End: 2017-11-14

## 2017-11-14 DIAGNOSIS — I35.1 AORTIC VALVE REGURGITATION, UNSPECIFIED ETIOLOGY: ICD-10-CM

## 2017-11-14 DIAGNOSIS — E11.8 CONTROLLED TYPE 2 DIABETES MELLITUS WITH COMPLICATION, WITHOUT LONG-TERM CURRENT USE OF INSULIN (HCC): ICD-10-CM

## 2017-11-14 DIAGNOSIS — E78.00 PURE HYPERCHOLESTEROLEMIA: ICD-10-CM

## 2017-11-14 DIAGNOSIS — I10 ESSENTIAL HYPERTENSION: ICD-10-CM

## 2017-11-14 DIAGNOSIS — I27.20 MILD PULMONARY HYPERTENSION (HCC): ICD-10-CM

## 2017-11-14 DIAGNOSIS — J90 PLEURAL EFFUSION: ICD-10-CM

## 2017-11-14 DIAGNOSIS — I50.42 CHRONIC COMBINED SYSTOLIC AND DIASTOLIC CONGESTIVE HEART FAILURE (HCC): ICD-10-CM

## 2017-11-14 DIAGNOSIS — Z95.2 S/P AVR (AORTIC VALVE REPLACEMENT): ICD-10-CM

## 2017-11-14 DIAGNOSIS — J44.9 CHRONIC OBSTRUCTIVE PULMONARY DISEASE, UNSPECIFIED COPD TYPE (HCC): ICD-10-CM

## 2017-11-14 DIAGNOSIS — E11.42 DIABETIC POLYNEUROPATHY ASSOCIATED WITH TYPE 2 DIABETES MELLITUS (HCC): ICD-10-CM

## 2017-11-14 PROCEDURE — 80053 COMPREHEN METABOLIC PANEL: CPT

## 2017-11-14 PROCEDURE — 80061 LIPID PANEL: CPT

## 2017-11-14 PROCEDURE — 83036 HEMOGLOBIN GLYCOSYLATED A1C: CPT

## 2017-11-14 PROCEDURE — 85025 COMPLETE CBC W/AUTO DIFF WBC: CPT

## 2017-11-14 PROCEDURE — 36415 COLL VENOUS BLD VENIPUNCTURE: CPT

## 2017-11-15 ENCOUNTER — MED REC SCAN ONLY (OUTPATIENT)
Dept: FAMILY MEDICINE CLINIC | Facility: CLINIC | Age: 77
End: 2017-11-15

## 2017-11-15 ENCOUNTER — APPOINTMENT (OUTPATIENT)
Dept: CARDIAC REHAB | Facility: HOSPITAL | Age: 77
End: 2017-11-15
Attending: INTERNAL MEDICINE
Payer: MEDICARE

## 2017-11-16 ENCOUNTER — OFFICE VISIT (OUTPATIENT)
Dept: FAMILY MEDICINE CLINIC | Facility: CLINIC | Age: 77
End: 2017-11-16

## 2017-11-16 ENCOUNTER — HOSPITAL ENCOUNTER (OUTPATIENT)
Dept: GENERAL RADIOLOGY | Age: 77
Discharge: HOME | End: 2017-11-16
Attending: FAMILY MEDICINE
Payer: MEDICARE

## 2017-11-16 VITALS
RESPIRATION RATE: 20 BRPM | HEART RATE: 64 BPM | OXYGEN SATURATION: 97 % | BODY MASS INDEX: 25.31 KG/M2 | HEIGHT: 73 IN | SYSTOLIC BLOOD PRESSURE: 122 MMHG | WEIGHT: 191 LBS | DIASTOLIC BLOOD PRESSURE: 70 MMHG | TEMPERATURE: 97 F

## 2017-11-16 DIAGNOSIS — E78.00 PURE HYPERCHOLESTEROLEMIA: ICD-10-CM

## 2017-11-16 DIAGNOSIS — I50.42 CHRONIC COMBINED SYSTOLIC AND DIASTOLIC CONGESTIVE HEART FAILURE, NYHA CLASS 2 (HCC): ICD-10-CM

## 2017-11-16 DIAGNOSIS — I31.8 RESTRICTIVE PERICARDITIS: ICD-10-CM

## 2017-11-16 DIAGNOSIS — E11.8 CONTROLLED TYPE 2 DIABETES MELLITUS WITH COMPLICATION, WITHOUT LONG-TERM CURRENT USE OF INSULIN (HCC): Primary | ICD-10-CM

## 2017-11-16 DIAGNOSIS — J44.9 CHRONIC OBSTRUCTIVE PULMONARY DISEASE, UNSPECIFIED COPD TYPE (HCC): ICD-10-CM

## 2017-11-16 PROCEDURE — 99214 OFFICE O/P EST MOD 30 MIN: CPT | Performed by: FAMILY MEDICINE

## 2017-11-16 PROCEDURE — 71020 XR CHEST PA + LAT CHEST (CPT=71020): CPT | Performed by: FAMILY MEDICINE

## 2017-11-16 PROCEDURE — G0008 ADMIN INFLUENZA VIRUS VAC: HCPCS | Performed by: FAMILY MEDICINE

## 2017-11-16 PROCEDURE — 90653 IIV ADJUVANT VACCINE IM: CPT | Performed by: FAMILY MEDICINE

## 2017-11-16 RX ORDER — GLIMEPIRIDE 2 MG/1
2 TABLET ORAL 2 TIMES DAILY
Qty: 180 TABLET | Refills: 0 | Status: SHIPPED | OUTPATIENT
Start: 2017-11-16 | End: 2018-04-06 | Stop reason: DRUGHIGH

## 2017-11-16 RX ORDER — TORSEMIDE 20 MG/1
40 TABLET ORAL DAILY
Qty: 180 TABLET | Refills: 0 | Status: ON HOLD | OUTPATIENT
Start: 2017-11-16 | End: 2018-02-20

## 2017-11-16 NOTE — PROGRESS NOTES
Eula Christianson is a 69year old male. HPI:   Eula Christianson is a 69year old male who presents for recheck of hyperlipidemia. Patient reports taking medications as instructed, no medication side effects noted. Denies any generalized muscle aches.  Aida needed for Wheezing or Shortness of Breath. Disp: 1 Inhaler Rfl: 0   Tiotropium Bromide Monohydrate (SPIRIVA RESPIMAT) 2.5 MCG/ACT Inhalation Aero Soln Inhale 2.5 mcg into the lungs daily.    Disp:  Rfl:    aspirin 81 MG Oral Tab EC Take 1 tablet by mouth d nourished,in no apparent distress  SKIN: no rashes,no suspicious lesions  NECK: supple,no adenopathy, thyroid normal to palpation  LUNGS: Breath sounds are distant with a few scattered rhonchi  CARDIO: RRR without murmur  EXTREMITIES: no cyanosis, clubbing

## 2017-11-17 ENCOUNTER — CARDPULM VISIT (OUTPATIENT)
Dept: CARDIAC REHAB | Facility: HOSPITAL | Age: 77
End: 2017-11-17
Attending: INTERNAL MEDICINE
Payer: MEDICARE

## 2017-11-17 PROCEDURE — 93798 PHYS/QHP OP CAR RHAB W/ECG: CPT

## 2017-11-20 ENCOUNTER — TELEPHONE (OUTPATIENT)
Dept: FAMILY MEDICINE CLINIC | Facility: CLINIC | Age: 77
End: 2017-11-20

## 2017-11-20 ENCOUNTER — CARDPULM VISIT (OUTPATIENT)
Dept: CARDIAC REHAB | Facility: HOSPITAL | Age: 77
End: 2017-11-20
Attending: INTERNAL MEDICINE
Payer: MEDICARE

## 2017-11-20 ENCOUNTER — PRIOR ORIGINAL RECORDS (OUTPATIENT)
Dept: OTHER | Age: 77
End: 2017-11-20

## 2017-11-20 PROCEDURE — 93798 PHYS/QHP OP CAR RHAB W/ECG: CPT

## 2017-11-20 NOTE — TELEPHONE ENCOUNTER
Called to ask patient to reschedule his appointment for 2/22/18 to after 2/27/18 for Medicare Annual Wellness visit

## 2017-11-21 ENCOUNTER — MYAURORA ACCOUNT LINK (OUTPATIENT)
Dept: OTHER | Age: 77
End: 2017-11-21

## 2017-11-21 ENCOUNTER — PRIOR ORIGINAL RECORDS (OUTPATIENT)
Dept: OTHER | Age: 77
End: 2017-11-21

## 2017-11-24 ENCOUNTER — APPOINTMENT (OUTPATIENT)
Dept: CARDIAC REHAB | Facility: HOSPITAL | Age: 77
End: 2017-11-24
Attending: INTERNAL MEDICINE
Payer: MEDICARE

## 2017-11-29 ENCOUNTER — APPOINTMENT (OUTPATIENT)
Dept: CARDIAC REHAB | Facility: HOSPITAL | Age: 77
End: 2017-11-29
Attending: INTERNAL MEDICINE
Payer: MEDICARE

## 2017-11-30 ENCOUNTER — HOSPITAL ENCOUNTER (OUTPATIENT)
Dept: CT IMAGING | Facility: HOSPITAL | Age: 77
Discharge: HOME | End: 2017-11-30
Attending: INTERNAL MEDICINE
Payer: MEDICARE

## 2017-11-30 DIAGNOSIS — J90 PLEURAL EFFUSION: ICD-10-CM

## 2017-11-30 PROCEDURE — 71250 CT THORAX DX C-: CPT | Performed by: INTERNAL MEDICINE

## 2017-12-01 ENCOUNTER — APPOINTMENT (OUTPATIENT)
Dept: CARDIAC REHAB | Facility: HOSPITAL | Age: 77
End: 2017-12-01
Attending: INTERNAL MEDICINE
Payer: MEDICARE

## 2017-12-04 ENCOUNTER — APPOINTMENT (OUTPATIENT)
Dept: CARDIAC REHAB | Facility: HOSPITAL | Age: 77
End: 2017-12-04
Attending: INTERNAL MEDICINE
Payer: MEDICARE

## 2017-12-06 ENCOUNTER — APPOINTMENT (OUTPATIENT)
Dept: CARDIAC REHAB | Facility: HOSPITAL | Age: 77
End: 2017-12-06
Attending: INTERNAL MEDICINE
Payer: MEDICARE

## 2017-12-08 ENCOUNTER — APPOINTMENT (OUTPATIENT)
Dept: GENERAL RADIOLOGY | Facility: HOSPITAL | Age: 77
DRG: 292 | End: 2017-12-08
Attending: EMERGENCY MEDICINE
Payer: MEDICARE

## 2017-12-08 ENCOUNTER — APPOINTMENT (OUTPATIENT)
Dept: CARDIAC REHAB | Facility: HOSPITAL | Age: 77
End: 2017-12-08
Attending: INTERNAL MEDICINE
Payer: MEDICARE

## 2017-12-08 ENCOUNTER — HOSPITAL ENCOUNTER (INPATIENT)
Facility: HOSPITAL | Age: 77
LOS: 4 days | Discharge: HOME HEALTH | DRG: 292 | End: 2017-12-12
Attending: EMERGENCY MEDICINE | Admitting: INTERNAL MEDICINE
Payer: MEDICARE

## 2017-12-08 DIAGNOSIS — R09.02 HYPOXEMIA: Primary | ICD-10-CM

## 2017-12-08 DIAGNOSIS — R40.4 TRANSIENT ALTERATION OF AWARENESS: ICD-10-CM

## 2017-12-08 DIAGNOSIS — R91.8 MULTIPLE NODULES OF LUNG: ICD-10-CM

## 2017-12-08 DIAGNOSIS — R55 SYNCOPE, NEAR: ICD-10-CM

## 2017-12-08 LAB
ALBUMIN: 3.4 G/DL
ALKALINE PHOSPHATATE(ALK PHOS): 136 IU/L
BILIRUBIN TOTAL: 0.5 MG/DL
BUN: 16 MG/DL
CALCIUM: 9.7 MG/DL
CHLORIDE: 97 MEQ/L
CHOLESTEROL, TOTAL: 192 MG/DL
CREATININE, SERUM: 1.12 MG/DL
GLUCOSE: 128 MG/DL
HDL CHOLESTEROL: 44 MG/DL
HEMOGLOBIN A1C: 6.2 %
LDL CHOLESTEROL: 120 MG/DL
POTASSIUM, SERUM: 4 MEQ/L
PROTEIN, TOTAL: 8.5 G/DL
SGOT (AST): 10 IU/L
SGPT (ALT): 17 IU/L
SODIUM: 136 MEQ/L
TRIGLYCERIDES: 140 MG/DL

## 2017-12-08 PROCEDURE — 71020 XR CHEST PA + LAT CHEST (CPT=71020): CPT | Performed by: EMERGENCY MEDICINE

## 2017-12-08 PROCEDURE — 99223 1ST HOSP IP/OBS HIGH 75: CPT | Performed by: HOSPITALIST

## 2017-12-08 RX ORDER — ATORVASTATIN CALCIUM 40 MG/1
40 TABLET, FILM COATED ORAL NIGHTLY
Status: DISCONTINUED | OUTPATIENT
Start: 2017-12-08 | End: 2017-12-12

## 2017-12-08 RX ORDER — ENOXAPARIN SODIUM 100 MG/ML
40 INJECTION SUBCUTANEOUS DAILY
Status: DISCONTINUED | OUTPATIENT
Start: 2017-12-08 | End: 2017-12-12

## 2017-12-08 RX ORDER — GABAPENTIN 300 MG/1
300 CAPSULE ORAL 2 TIMES DAILY
Status: DISCONTINUED | OUTPATIENT
Start: 2017-12-08 | End: 2017-12-09

## 2017-12-08 RX ORDER — ASPIRIN 81 MG/1
81 TABLET ORAL DAILY
Status: DISCONTINUED | OUTPATIENT
Start: 2017-12-08 | End: 2017-12-12

## 2017-12-08 RX ORDER — DEXTROSE MONOHYDRATE 25 G/50ML
50 INJECTION, SOLUTION INTRAVENOUS
Status: DISCONTINUED | OUTPATIENT
Start: 2017-12-08 | End: 2017-12-12

## 2017-12-08 RX ORDER — TORSEMIDE 20 MG/1
40 TABLET ORAL DAILY
Status: DISCONTINUED | OUTPATIENT
Start: 2017-12-08 | End: 2017-12-12

## 2017-12-08 RX ORDER — ONDANSETRON 2 MG/ML
4 INJECTION INTRAMUSCULAR; INTRAVENOUS EVERY 6 HOURS PRN
Status: DISCONTINUED | OUTPATIENT
Start: 2017-12-08 | End: 2017-12-12

## 2017-12-08 RX ORDER — ACETAMINOPHEN 325 MG/1
650 TABLET ORAL EVERY 6 HOURS PRN
Status: DISCONTINUED | OUTPATIENT
Start: 2017-12-08 | End: 2017-12-12

## 2017-12-08 RX ORDER — IPRATROPIUM BROMIDE AND ALBUTEROL SULFATE 2.5; .5 MG/3ML; MG/3ML
3 SOLUTION RESPIRATORY (INHALATION) EVERY 6 HOURS PRN
Status: DISCONTINUED | OUTPATIENT
Start: 2017-12-08 | End: 2017-12-12

## 2017-12-08 RX ORDER — TORSEMIDE 20 MG/1
20 TABLET ORAL NIGHTLY
COMMUNITY
End: 2018-02-02 | Stop reason: DRUGHIGH

## 2017-12-09 PROCEDURE — 99232 SBSQ HOSP IP/OBS MODERATE 35: CPT | Performed by: INTERNAL MEDICINE

## 2017-12-09 RX ORDER — CEFUROXIME AXETIL 500 MG/1
500 TABLET ORAL 2 TIMES DAILY
Status: DISCONTINUED | OUTPATIENT
Start: 2017-12-09 | End: 2017-12-12

## 2017-12-09 RX ORDER — TORSEMIDE 20 MG/1
20 TABLET ORAL NIGHTLY
Status: DISCONTINUED | OUTPATIENT
Start: 2017-12-09 | End: 2017-12-12

## 2017-12-09 RX ORDER — GUAIFENESIN 600 MG
600 TABLET, EXTENDED RELEASE 12 HR ORAL 2 TIMES DAILY
Status: DISCONTINUED | OUTPATIENT
Start: 2017-12-10 | End: 2017-12-12

## 2017-12-09 RX ORDER — GABAPENTIN 300 MG/1
900 CAPSULE ORAL 2 TIMES DAILY
Status: DISCONTINUED | OUTPATIENT
Start: 2017-12-09 | End: 2017-12-12

## 2017-12-09 NOTE — CONSULTS
[facility]  Report of Consultation    Terrance Wooten Patient Status:  Inpatient    1940 MRN [id number]   [facility] 5NW-A Attending Rodriguez Christian MD   Hosp Day # 1 PCP Marshal Ordoñez MD     Reason for Consultation:   On restaurant. Increasing weakness with confusion and subsequent fall while walking to the car where there was thought to be some transient loss of consciousness. O2 sats were 80% when found by paramedics.   Currently his sats are 100% on 6 L at rest he requ tablet Rfl: 0 12/8/2017 at 0900   Glucose Blood (PARAS CONTOUR NEXT TEST) In Vitro Strip USE AS DIRECTED TO TEST BLOOD GLUCOSE ONCE DAILY. Disp: 100 strip Rfl: 3 12/8/2017 at 0900   torsemide 20 MG Oral Tab Take 2 tablets (40 mg total) by mouth daily.  Disp 130/77 - - 64 24 100 % - -   12/08/17 2134 127/69 - - 63 20 100 % - -   12/08/17 2055 108/71 - - 66 28 100 % - -   12/08/17 1928 125/73 97.9 °F (36.6 °C) Temporal 76 22 100 % 6' 1\" (1.854 m) 191 lb (86.6 kg)           Physical Exam:   General: alert, coop complications ([address]     Essential hypertension     Pure hypercholesterolemia     SEBLE on CPAP     S/P AVR (aortic valve replacement)     Perennial non-allergic rhinitis     Pleural effusion     Restrictive pericarditis     Aortic insufficiency     Diabetic PM

## 2017-12-09 NOTE — ED INITIAL ASSESSMENT (HPI)
Pt, who's normally on 8L oxygen at home, went to a restaurant tonight on 5L, informs ER staff that he didn't think it was enough, and had a near-syncopal episode there, immediately feeling better after the medics put him on nonrebreather (12L).  [employer] pagan

## 2017-12-09 NOTE — PHYSICAL THERAPY NOTE
PHYSICAL THERAPY EVALUATION - INPATIENT     Room Number: 530/530-A  Evaluation Date: 12/9/2017  Type of Evaluation: Initial  Physician Order: PT Eval and Treat    Presenting Problem: syncopal episode resulting in fall  Reason for Therapy: Mobility Dysf N/CARPAL TUNNEL SURG Left      Comment: Procedure: CARPAL TUNNEL RELEASE;  Surgeon:                Bo Bai MD;  Location: [place] [address]  07/21/2017: VALVE REPAIR      Comment: [facility] in [place], [place]  01/30/2015: VALVE REPLACEMENT      Comment: AVR How much help from another person does the patient currently need. ..   -   Moving to and from a bed to a chair (including a wheelchair)?: A Little   -   Need to walk in hospital room?: A Little   -   Climbing 3-5 steps with a railing?: A Lot       AM-PAC evaluation complexity is considered moderate. Pt's mobility limited due to impaired respiratory status  These impairments and comorbidities manifest themselves as functional limitations in independent bed mobility, transfers, and gait/stairs.   The patient

## 2017-12-09 NOTE — PROGRESS NOTES
[facility]  Progress Note     Terrance Wooten Patient Status:  Observation    1940 MRN [id number]   [facility] 5NW-A Attending Rodriguez Christian MD   Hosp Day # 0 PCP Marshal Ordoñez MD     Chief Complaint: syncope    S: Pa 40 mg Subcutaneous Daily   • Insulin Aspart Pen  1-5 Units Subcutaneous TID CC and HS       ASSESSMENT / PLAN:     1. Dyspnea/hypoxia   1. Secondary to using portable oxygen concentrator at 5L when patient requires 8L  2.  Recent HRCT without pulmonary fibr two midnight's based on the clinical documentation in H+P. Based on patients current state of illness, I anticipate that, after discharge, patient will require TBD.

## 2017-12-09 NOTE — H&P
[facility]  History and Physical     aSm Goodwin Patient Status:  Emergency    1940 MRN [id number]   Location [facility] Attending Les Mosley MD   Hosp Day # 0 PCP Makayla Brink MD     Chief Complai Comment: ventral hernia repair; complex ventral                herniorrhaphy with composix mesh  02/11/2005: OTHER SURGICAL HISTORY      Comment: surgery for abdominal aortic aneurysm  1/30/2015: OTHER SURGICAL HISTORY      Comment: aortic valve replacemen daily. Disp:  Rfl: 4   Albuterol Sulfate HFA (PROAIR HFA) 108 (90 BASE) MCG/ACT Inhalation Aero Soln Inhale 2 puffs into the lungs every 6 (six) hours as needed for Wheezing or Shortness of Breath.  Disp: 1 Inhaler Rfl: 0   Tiotropium Bromide Monohydrate (S 1. Dyspnea/hypoxia   1. Secondary to using portable oxygen concentrator at 5L when patient requires 8L  2. Recent HRCT without pulmonary fibrosis  3. Recent CRISTOBAL and stress test earlier this year without acute findings   4. Pulmonary eval   2.  Syncope lik

## 2017-12-09 NOTE — ED PROVIDER NOTES
Patient Seen in: [facility] Emergency Department    History   Patient presents with:  Syncope (cardiovascular, neurologic)  Dyspnea ABY SOB (respiratory)    Stated Complaint: aby and near-syncopal episode    HPI    71-year-old man presents after a n History:  06/16/2017: ANGIOGRAM      Comment: [facility]  1994: ARTHROSCOPY OF JOINT UNLISTED Left      Comment: knee  1980: CHOLECYSTECTOMY  2006: HERNIA SURGERY      Comment: ventral hernia repair; complex ventral                herniorrhaphy with composix mesh No peritoneal signs   Extremities: no edema, normal peripheral pulses   Neuro: Alert oriented and nonfocal   Skin: no rashes or nodules    ED Course     Labs Reviewed   COMP METABOLIC PANEL (14) - Abnormal; Notable for the following:        Result Value additional history at this time. FINDINGS:  LUNGS:  Cardiac silhouette and pulmonary vasculature within normal limits. Persistent bibasilar pleural parenchymal opacity consistent with pleural effusions with atelectasis/consolidation.   Right lower lobe

## 2017-12-09 NOTE — PROGRESS NOTES
NURSING ADMISSION NOTE      Patient admitted via Cart  Oriented to room. Safety precautions initiated. Bed in low position. Call light in reach. Admission navigator complete. All pt and family questions answered at this time. Pt is A&O x4.  VSS af

## 2017-12-10 ENCOUNTER — APPOINTMENT (OUTPATIENT)
Dept: GENERAL RADIOLOGY | Facility: HOSPITAL | Age: 77
DRG: 292 | End: 2017-12-10
Attending: INTERNAL MEDICINE
Payer: MEDICARE

## 2017-12-10 PROCEDURE — 99232 SBSQ HOSP IP/OBS MODERATE 35: CPT | Performed by: INTERNAL MEDICINE

## 2017-12-10 RX ORDER — POTASSIUM CHLORIDE 20 MEQ/1
40 TABLET, EXTENDED RELEASE ORAL EVERY 4 HOURS
Status: COMPLETED | OUTPATIENT
Start: 2017-12-10 | End: 2017-12-10

## 2017-12-10 NOTE — PROGRESS NOTES
[facility]  Progress Note     Paddy Louis Patient Status:  Observation    1940 MRN [id number]   [facility] 5NW-A Attending Kristeen Hashimoto, MD   Hosp Day # 2 PCP Gerson Ledesma MD     Chief Complaint: syncope    S: Pa Nightly   • Fluticasone Furoate-Vilanterol  1 puff Inhalation Daily   • Umeclidinium Bromide  1 puff Inhalation Daily   • torsemide  40 mg Oral Daily   • enoxaparin  40 mg Subcutaneous Daily   • Insulin Aspart Pen  1-5 Units Subcutaneous TID CC and HS

## 2017-12-10 NOTE — PROGRESS NOTES
[facility]  Progress Note    Lorne Kulkarni Patient Status:  Inpatient    1940 MRN [id number]   [facility] 5NW-A Attending Sugar Wilks MD   [facility] Day # 2 PCP Holden Abbott MD     Subjective:  Lorne Kulkarni is a(n) 7 hypertension     Pure hypercholesterolemia     SEBLE on CPAP     S/P AVR (aortic valve replacement)     Perennial non-allergic rhinitis     Pleural effusion     Restrictive pericarditis     Aortic insufficiency     Diabetic polyneuropathy associated with typ

## 2017-12-11 ENCOUNTER — APPOINTMENT (OUTPATIENT)
Dept: GENERAL RADIOLOGY | Facility: HOSPITAL | Age: 77
DRG: 292 | End: 2017-12-11
Attending: INTERNAL MEDICINE
Payer: MEDICARE

## 2017-12-11 ENCOUNTER — APPOINTMENT (OUTPATIENT)
Dept: ULTRASOUND IMAGING | Facility: HOSPITAL | Age: 77
DRG: 292 | End: 2017-12-11
Attending: INTERNAL MEDICINE
Payer: MEDICARE

## 2017-12-11 PROCEDURE — 73562 X-RAY EXAM OF KNEE 3: CPT | Performed by: INTERNAL MEDICINE

## 2017-12-11 PROCEDURE — 71010 XR CHEST AP PORTABLE  (CPT=71010): CPT | Performed by: INTERNAL MEDICINE

## 2017-12-11 PROCEDURE — 0W993ZZ DRAINAGE OF RIGHT PLEURAL CAVITY, PERCUTANEOUS APPROACH: ICD-10-PCS | Performed by: RADIOLOGY

## 2017-12-11 PROCEDURE — 99232 SBSQ HOSP IP/OBS MODERATE 35: CPT | Performed by: INTERNAL MEDICINE

## 2017-12-11 PROCEDURE — 32555 ASPIRATE PLEURA W/ IMAGING: CPT | Performed by: INTERNAL MEDICINE

## 2017-12-11 RX ORDER — GABAPENTIN 300 MG/1
900 CAPSULE ORAL 2 TIMES DAILY
COMMUNITY
End: 2018-03-06

## 2017-12-11 RX ORDER — TRIAMCINOLONE ACETONIDE 40 MG/ML
40 INJECTION, SUSPENSION INTRA-ARTICULAR; INTRAMUSCULAR ONCE
Status: DISCONTINUED | OUTPATIENT
Start: 2017-12-11 | End: 2017-12-12

## 2017-12-11 RX ORDER — POLYETHYLENE GLYCOL 3350 17 G/17G
17 POWDER, FOR SOLUTION ORAL DAILY
Status: DISCONTINUED | OUTPATIENT
Start: 2017-12-11 | End: 2017-12-12

## 2017-12-11 RX ORDER — SENNA AND DOCUSATE SODIUM 50; 8.6 MG/1; MG/1
2 TABLET, FILM COATED ORAL DAILY
Status: DISCONTINUED | OUTPATIENT
Start: 2017-12-11 | End: 2017-12-12

## 2017-12-11 RX ORDER — LIDOCAINE HYDROCHLORIDE 10 MG/ML
3 INJECTION, SOLUTION EPIDURAL; INFILTRATION; INTRACAUDAL; PERINEURAL ONCE
Status: DISCONTINUED | OUTPATIENT
Start: 2017-12-11 | End: 2017-12-12

## 2017-12-11 NOTE — PROGRESS NOTES
[facility]  Progress Note     Zora Bellmont Patient Status:  Observation    1940 MRN [id number]   [facility] 5NW-A Attending Regulo Casas MD   Hosp Day # 3 PCP Bethanie Pierson MD     Chief Complaint: syncope    S: Pa reviewed in Epic.     Medications:   • GuaiFENesin ER  600 mg Oral BID   • torsemide  20 mg Oral Nightly   • gabapentin  900 mg Oral BID   • Cefuroxime Axetil  500 mg Oral BID   • aspirin  81 mg Oral Daily   • atorvastatin  40 mg Oral Nightly   • Fluticason Prophylaxis: lovenox  · CODE status: Full  · Mejia: none  · Central line: none    Estimated date of discharge: TBD  Discharge is dependent on: progress  At this point Mr. Teresa Wetzel is expected to be discharge to: home    Plan of care discussed with patient,

## 2017-12-11 NOTE — PROCEDURES
[facility]  Procedure Note    Chang Torres Patient Status:  Inpatient    1940 MRN [id number]   [facility] 5NW-A Attending Silver Lopez MD   [address] Day # 3 PCP Camilo Madrid MD     Procedure: R effusion    Pre-Procedure

## 2017-12-11 NOTE — IMAGING NOTE
Report given to [facility]  cc removed from the right side. Dressing dry and intact. Transported to  530 by cart.

## 2017-12-11 NOTE — CM/SW NOTE
12/11/17 1500   CM/SW Referral Data   Referral Source    Reason for Referral Discharge planning;Readmission   Informant Patient;Spouse   Pertinent Medical Hx   Primary Care Physician Name (Dr Nolberto Morton)   Significant Past Medical/Mental Heal

## 2017-12-11 NOTE — PHYSICAL THERAPY NOTE
PHYSICAL THERAPY TREATMENT NOTE - INPATIENT    Room Number: 770/989-I     Session: 1/5        Presenting Problem: syncopal episode resulting in fall     History related to current admission: Pt out at dinner and portable O2 only on 5L when he usually need aneurysm  1/30/2015: OTHER SURGICAL HISTORY      Comment: aortic valve replacement  4/1/2016: REVISE MEDIAN N/CARPAL TUNNEL SURG Left      Comment: Procedure: CARPAL TUNNEL RELEASE;  Surgeon:                Belkys Menendez MD;  Location: [place] [address] 40  Assistive Device: Rolling walker  Pattern: R Decreased stance time; Shuffle;Comment (B shld elev; mod kyphosis)  Stoop/Curb Assistance: Not tested       Skilled Therapy Provided: Pt presents to PT lying in semi supine position in bed c wife present.  Pt admitted, the pt was participating in pulmonary rehab. Pt would like to cont pulmonary rehab following HHPT. RN made aware.      DISCHARGE RECOMMENDATIONS  PT Discharge Recommendations: Home with home health PT;Other (Comment) (c family assist PRN)     PLAN

## 2017-12-11 NOTE — PROGRESS NOTES
[facility]  Progress Note    Lilian Hernandez Patient Status:  Inpatient    1940 MRN [id number]   [facility] 5NW-A Attending Omar Dominguez MD   Hosp Day # 3 PCP Ubaldo Lino MD     Impression     1.  History of heart hypertension     Dyspnea     Dyspnea, unspecified type     Acute chest pain     CHF (congestive heart failure), NYHA class II (HCC)     Chronic combined systolic and diastolic congestive heart failure (HCC)     Heart failure with preserved ejection fractio ALB  3.2*   --    --    --    NA  135*  136   --   137   K  3.9  3.4*  4.5  3.8   CL  91*  92*   --   94*   CO2  36.0*  43.0*   --   42.0*   ALKPHO  131*   --    --    --    AST  15   --    --    --    ALT  21   --    --    --    BILT  0.4   --    -- Insulin Aspart Pen (NOVOLOG) 100 UNIT/ML flexpen 1-5 Units 1-5 Units Subcutaneous TID CC and HS       PEAK flow  PF Readings from Last 1 Encounters:  No data found for PF        Marge Angeles  12/11/2017  1:19 PM

## 2017-12-12 ENCOUNTER — APPOINTMENT (OUTPATIENT)
Dept: GENERAL RADIOLOGY | Facility: HOSPITAL | Age: 77
DRG: 292 | End: 2017-12-12
Attending: INTERNAL MEDICINE
Payer: MEDICARE

## 2017-12-12 VITALS
HEIGHT: 73 IN | SYSTOLIC BLOOD PRESSURE: 100 MMHG | BODY MASS INDEX: 25.23 KG/M2 | WEIGHT: 190.38 LBS | DIASTOLIC BLOOD PRESSURE: 55 MMHG | OXYGEN SATURATION: 100 % | RESPIRATION RATE: 20 BRPM | TEMPERATURE: 98 F | HEART RATE: 65 BPM

## 2017-12-12 PROCEDURE — 99239 HOSP IP/OBS DSCHRG MGMT >30: CPT | Performed by: INTERNAL MEDICINE

## 2017-12-12 PROCEDURE — 71010 XR CHEST AP PORTABLE  (CPT=71010): CPT | Performed by: INTERNAL MEDICINE

## 2017-12-12 RX ORDER — FLUTICASONE PROPIONATE AND SALMETEROL 232; 14 UG/1; UG/1
1 POWDER, METERED RESPIRATORY (INHALATION) 2 TIMES DAILY
COMMUNITY
End: 2019-04-05 | Stop reason: ALTCHOICE

## 2017-12-12 RX ORDER — CEFUROXIME AXETIL 500 MG/1
500 TABLET ORAL 2 TIMES DAILY
Qty: 6 TABLET | Refills: 0 | Status: SHIPPED | OUTPATIENT
Start: 2017-12-12 | End: 2018-01-24

## 2017-12-12 RX ORDER — POTASSIUM CHLORIDE 20 MEQ/1
40 TABLET, EXTENDED RELEASE ORAL ONCE
Status: COMPLETED | OUTPATIENT
Start: 2017-12-12 | End: 2017-12-12

## 2017-12-12 NOTE — CONSULTS
[facility]    Report of Consultation    Chang Torres Patient Status:  Inpatient    1940 MRN [id number]   [facility] 5NW-A Attending Silver Lopez MD   Psychiatric Day # 3 PCP Camilo Madrid MD     Date of Admission:   SURG Left      Comment: Procedure: CARPAL TUNNEL RELEASE;  Surgeon:                Thais Swift MD;  Location: [place] [address]  07/21/2017: VALVE REPAIR      Comment: [facility] in [place], [place]  01/30/2015: VALVE REPLACEMENT      Comment: AVR at [facility]    Family Nebulization Q6H PRN   glucose (DEX4) oral liquid 15 g 15 g Oral Q15 Min PRN   Or      Glucose-Vitamin C (DEX-4) 4-0.006 g chewable tab 4 tablet 4 tablet Oral Q15 Min PRN   Or      dextrose 50% injection 50 mL 50 mL Intravenous Q15 Min PRN   Or      glucos dry.  R knee - skin intact, moderate effusion. Painless ROM from 0 degrees to 100 degrees of flexion. No pain in short arcs. Distally nvi.         Results:     Laboratory Data:    Lab Results  Component Value Date   WBC 5.7 12/11/2017   HGB 10.8 (L) 12/1 12/11/2017 at 10:35     Approved by: Mari Mario MD            Xr Chest Ap Portable  (cpt=71010)    Result Date: 12/11/2017  CONCLUSION:  1. Interval decrease in size of right pleural effusion status post thoracentesis without pneumothorax.     Alvin Valdez

## 2017-12-12 NOTE — PHYSICAL THERAPY NOTE
PHYSICAL THERAPY TREATMENT NOTE - INPATIENT    Room Number: 710/240-X     Session: 2/5       Presenting Problem: syncopal episode resulting in fall    Problem List  Principal Problem:    Hypoxemia  Active Problems:    Syncope, near    Transient alteration just sitting here, no pain\"    Patient’s self-stated goal is to go home     OBJECTIVE  Precautions: None    WEIGHT BEARING RESTRICTION  Weight Bearing Restriction: None                PAIN ASSESSMENT   Ratin (at rest, inc to 2/10 w/ amb )  Location: R [facility] from [facility] [place] [facility] [place]/ supervision. Pt ambulated 60ft in hallway using RW w/ CGA to correct balance 2x. Pt transferred from standing to [facility] w/ supervision.  Pt educated to perform therapeutic exercises to continue to decrease edema in R k

## 2017-12-12 NOTE — PROGRESS NOTES
[facility]  Progress Note     [facility] Patient Status:  Observation    1940 MRN [id number]   [facility] 5NW-A Attending Oneil Freedman MD   Hosp Day # 4 PCP Shania Turcios MD     Chief Complaint: syncope    S: Pa triamcinolone acetonide  40 mg Intra-articular Once   • Lidocaine HCl (PF)  3 mL Intra-articular Once   • PEG 3350  17 g Oral Daily   • Senna-Docusate Sodium  2 tablet Oral Daily   • GuaiFENesin ER  600 mg Oral BID   • torsemide  20 mg Oral Nightly   • barry 5/2015  15. H/o constrictive pericarditis   16. Hypertension  17. R foot wound  18. Dyslipidemia  1. Statin   19. DM  1. ISS     Plan of care: await pulguido king, ?  DC planning    Quality:  · DVT Prophylaxis: lovenox  · CODE status: Full  · Mejia: none  · Sj

## 2017-12-12 NOTE — DISCHARGE SUMMARY
[facility] [place] [facility]  DISCHARGE SUMMARY     Sam Goodwin Patient Status:  Inpatient    1940 MRN [id number]   [facility] 5NW-A Attending Echo Prescott MD   [facility] Day # 4 PCP Makayla Brink MD     Date of Admission: 2017  D thoracentesis that demonstrated transudate. He had syncopal episode prior to admission that was likley due to hypoxia. He suffered R knee pain and R toe laceration. He was evalauted by ortho and worked with therapy. He improved with pain control.  Pt resp s Follow-up appointment:   No follow-up provider specified.     Vital signs:  Temp:  [97.3 °F (36.3 °C)-98.2 °F (36.8 °C)] 97.5 °F (36.4 °C)  Pulse:  [64-79] 74  Resp:  [20-22] 20  BP: (104-122)/(54-67) 114/63      --------------------------------------

## 2017-12-12 NOTE — PROGRESS NOTES
[facility]  Progress Note    Javed Peterson Patient Status:  Inpatient    1940 MRN [id number]   [facility] 5NW-A Attending Alfonzo Rasheed MD   Hosp Day # 4 PCP Yessica Costa MD     Impression     1.  History of heart josef Diabetic polyneuropathy associated with type 2 diabetes mellitus (HCC)     Sensory hearing loss, bilateral     Mild pulmonary hypertension     Dyspnea     Dyspnea, unspecified type     Acute chest pain     CHF (congestive heart failure), [facility] 0606  12/12/17   0612   GLU  117*   --   122*  125*   BUN  20   --   20  23*   CREATSERUM  0.89   --   1.00  1.02   CA  9.2   --   9.3  9.1   NA  136   --   137  136   K  3.4*  4.5  3.8  3.7   CL  92*   --   94*  92*   CO2  43.0*   --   42.0*  41.0*     @M tab 4 tablet 4 tablet Oral Q15 Min PRN   Or      dextrose 50% injection 50 mL 50 mL Intravenous Q15 Min PRN   Or      glucose (DEX4) oral liquid 30 g 30 g Oral Q15 Min PRN   Or      Glucose-Vitamin C (DEX-4) 4-0.006 g chewable tab 8 tablet 8 tablet Oral Q1

## 2017-12-12 NOTE — HOME CARE LIAISON
DIAGNOSES AND PERTINENT MEDICAL HISTORY:  SYNCOPE, HYPOXIA    FACILITY NAME AND DC DATE:  [facility] VISIT WITH:  STACY MET WITH PATIENT AT BEDSIDE, CHOICE OFFERED AGREEABLE WITH [facility] HOME [facility]    SERVICES ORDERED:  RN P

## 2017-12-12 NOTE — PROGRESS NOTES
Multidisciplinary Discharge Rounds held 12/12/2017. Treatment team members present today include , , Charge Nurse,  Nurse, RT, PT and Pharmacy caring for Waleska Resendez.      Other care providers present:    Patient Active Prob

## 2017-12-13 ENCOUNTER — APPOINTMENT (OUTPATIENT)
Dept: CARDIAC REHAB | Facility: HOSPITAL | Age: 77
End: 2017-12-13
Attending: INTERNAL MEDICINE
Payer: MEDICARE

## 2017-12-13 ENCOUNTER — PATIENT OUTREACH (OUTPATIENT)
Dept: CASE MANAGEMENT | Age: 77
End: 2017-12-13

## 2017-12-13 DIAGNOSIS — Z02.9 ENCOUNTERS FOR ADMINISTRATIVE PURPOSE: ICD-10-CM

## 2017-12-13 NOTE — PROGRESS NOTES
Initial Post Discharge Follow Up   Discharge Date: 12/12/17  Contact Date: 12/13/2017    Consent Verification:  Assessment Completed With: Patient  HIPAA Verified?   Yes    Discharge Dx:   Chronic hypoxic respiratory failure, Syncope, R Knee Pain    Gene Inhale 1 puff into the lungs 2 (two) times daily. Disp:  Rfl:    CEFUROXIME AXETIL 500 MG Oral Tab Take 1 tablet (500 mg total) by mouth 2 (two) times daily. Disp: 6 tablet Rfl: 0   gabapentin 300 MG Oral Cap Take 900 mg by mouth 2 (two) times daily.  Disp: Surgery:  . TCMCVSURGERY  Pneumonia: . TCMPNEUMONIATEMP  Ortho/Spine:  TCMORTHOSPINETEMP  Re-admit:  . TCMREADMITTEMP           Needs post D/C:   Now that you are home, are there any needs or concerns you need addressed before your next visit with your PCP? your blood glucose is within normal range for this exam, we will proceed with your test. If not, we will reschedule your exam.  You may bring a relaxing music CD to listen to or we can provide one for you.   You will be resting for about 90 minutes followin your exam.  You may bring a relaxing music CD to listen to or we can provide one for you. You will be resting for about 90 minutes following the injection. After you have rested the scan will take about 30-60 minutes.   There are no side effects to the in [facility] [address] [place] [id number]          PCP TCM/HFU appointment: scheduled at D/C within 7-14 days  no     NCM Reviewed/scheduled/rescheduled PCP TCM/HFU appointment with pt:   Yes-

## 2017-12-13 NOTE — CM/SW NOTE
Patient discharged on 12/12/17 as previously planned.        12/13/17 0800   Discharge disposition   Discharged to: Home-Health   Name of [facility] 1 services after discharge Skilled home care   Discharge transportation Pr

## 2017-12-14 ENCOUNTER — TELEPHONE (OUTPATIENT)
Dept: FAMILY MEDICINE CLINIC | Facility: CLINIC | Age: 77
End: 2017-12-14

## 2017-12-14 NOTE — TELEPHONE ENCOUNTER
FYI:  [facility] health called to advise that they were admitting this patient into their care. Thank you.

## 2017-12-15 ENCOUNTER — APPOINTMENT (OUTPATIENT)
Dept: CARDIAC REHAB | Facility: HOSPITAL | Age: 77
End: 2017-12-15
Attending: INTERNAL MEDICINE
Payer: MEDICARE

## 2017-12-18 ENCOUNTER — APPOINTMENT (OUTPATIENT)
Dept: CARDIAC REHAB | Facility: HOSPITAL | Age: 77
End: 2017-12-18
Attending: INTERNAL MEDICINE
Payer: MEDICARE

## 2017-12-18 ENCOUNTER — OFFICE VISIT (OUTPATIENT)
Dept: FAMILY MEDICINE CLINIC | Facility: CLINIC | Age: 77
End: 2017-12-18

## 2017-12-18 VITALS
DIASTOLIC BLOOD PRESSURE: 62 MMHG | SYSTOLIC BLOOD PRESSURE: 128 MMHG | RESPIRATION RATE: 20 BRPM | OXYGEN SATURATION: 99 % | WEIGHT: 196 LBS | HEIGHT: 73 IN | TEMPERATURE: 97 F | HEART RATE: 78 BPM | BODY MASS INDEX: 25.98 KG/M2

## 2017-12-18 DIAGNOSIS — J96.11 CHRONIC RESPIRATORY FAILURE WITH HYPOXIA (HCC): ICD-10-CM

## 2017-12-18 DIAGNOSIS — R39.15 URINARY URGENCY: ICD-10-CM

## 2017-12-18 DIAGNOSIS — Z23 NEED FOR VACCINATION: ICD-10-CM

## 2017-12-18 DIAGNOSIS — R40.4 TRANSIENT ALTERATION OF AWARENESS: Primary | ICD-10-CM

## 2017-12-18 DIAGNOSIS — S51.012D LACERATION OF LEFT ELBOW, SUBSEQUENT ENCOUNTER: ICD-10-CM

## 2017-12-18 DIAGNOSIS — I50.42 CHRONIC COMBINED SYSTOLIC AND DIASTOLIC CONGESTIVE HEART FAILURE (HCC): ICD-10-CM

## 2017-12-18 DIAGNOSIS — M19.91 PRIMARY OSTEOARTHRITIS, UNSPECIFIED SITE: ICD-10-CM

## 2017-12-18 PROBLEM — S51.012A LACERATION OF LEFT ELBOW: Status: ACTIVE | Noted: 2017-12-18

## 2017-12-18 PROCEDURE — 90471 IMMUNIZATION ADMIN: CPT | Performed by: FAMILY MEDICINE

## 2017-12-18 PROCEDURE — 99495 TRANSJ CARE MGMT MOD F2F 14D: CPT | Performed by: FAMILY MEDICINE

## 2017-12-18 PROCEDURE — 90715 TDAP VACCINE 7 YRS/> IM: CPT | Performed by: FAMILY MEDICINE

## 2017-12-18 RX ORDER — ACETAMINOPHEN 500 MG
1000 TABLET ORAL EVERY 6 HOURS PRN
COMMUNITY
End: 2019-04-18 | Stop reason: ALTCHOICE

## 2017-12-18 NOTE — PROGRESS NOTES
HPI:    Cathie Ramos is a 69year old male here today for hospital follow up.    He was discharged from Inpatient hospital, [facility] to Home   Admission Date: 12/8/17   Discharge Date: 12/12/17  Hospital Discharge Diagnosis: Chronic Hypoxic R department he felt that he was near normal with baseline SOB. Denies cough, fever, or chills. Pt was admitted and continued on his regular home regimen of O2. He was evaluated by pulmonary and underwent thoracentesis that demonstrated transudate.   It was nightly. No current facility-administered medications on file prior to visit. HISTORY: reconciled and reviewed with patient  He  has a past medical history of Aortic stenosis;  Aortic valve replaced; COPD (chronic obstructive pulmonary disease) (H extremities  NEURO: denies headaches, denies dizziness, denies weakness  PSYCHE: denies depression or anxiety  HEMATOLOGIC: denies hx of anemia, or bruising, denies bleeding  ENDOCRINE: denies thyroid history  ALL/ASTHMA: denies hx of allergy or asthma Imaging & Consults:  TETANUS, DIPHTHERIA TOXOIDS AND ACELLULAR PERTUSIS VACCINE (TDAP), >7 YEARS, IM USE       Transitional Care Management Certification:  I certify that the following are true:  Communication with the patient was made within 2 busin

## 2017-12-19 ENCOUNTER — TELEPHONE (OUTPATIENT)
Dept: FAMILY MEDICINE CLINIC | Facility: CLINIC | Age: 77
End: 2017-12-19

## 2017-12-19 NOTE — TELEPHONE ENCOUNTER
Gavi Fernandes is asking for a written order for pt to resume cardiac rehab. Pls fax to [phone].

## 2017-12-20 ENCOUNTER — APPOINTMENT (OUTPATIENT)
Dept: CARDIAC REHAB | Facility: HOSPITAL | Age: 77
End: 2017-12-20
Attending: INTERNAL MEDICINE
Payer: MEDICARE

## 2017-12-20 ENCOUNTER — HOSPITAL ENCOUNTER (OUTPATIENT)
Dept: NUCLEAR MEDICINE | Facility: HOSPITAL | Age: 77
Discharge: HOME | End: 2017-12-20
Attending: INTERNAL MEDICINE
Payer: MEDICARE

## 2017-12-20 DIAGNOSIS — R91.8 MULTIPLE NODULES OF LUNG: ICD-10-CM

## 2017-12-20 PROCEDURE — 82962 GLUCOSE BLOOD TEST: CPT

## 2017-12-20 PROCEDURE — 78815 PET IMAGE W/CT SKULL-THIGH: CPT | Performed by: INTERNAL MEDICINE

## 2017-12-22 ENCOUNTER — APPOINTMENT (OUTPATIENT)
Dept: CARDIAC REHAB | Facility: HOSPITAL | Age: 77
End: 2017-12-22
Attending: INTERNAL MEDICINE
Payer: MEDICARE

## 2017-12-25 ENCOUNTER — APPOINTMENT (OUTPATIENT)
Dept: CARDIAC REHAB | Facility: HOSPITAL | Age: 77
End: 2017-12-25
Attending: INTERNAL MEDICINE
Payer: MEDICARE

## 2017-12-27 ENCOUNTER — APPOINTMENT (OUTPATIENT)
Dept: CARDIAC REHAB | Facility: HOSPITAL | Age: 77
End: 2017-12-27
Attending: INTERNAL MEDICINE
Payer: MEDICARE

## 2018-01-04 ENCOUNTER — TELEPHONE (OUTPATIENT)
Dept: FAMILY MEDICINE CLINIC | Facility: CLINIC | Age: 78
End: 2018-01-04

## 2018-01-04 NOTE — TELEPHONE ENCOUNTER
Josephus Eisenmenger PT from [facility] home health called to let you know he will be discharging pt from home health tomorrow. Pt is progressing well and achieving goals. Dr Vanessa Borges recommended out pt pulmonary rehab for pt going forward. Sending as Christiana Snyder.  Nothing needed on our

## 2018-01-08 ENCOUNTER — CARDPULM VISIT (OUTPATIENT)
Dept: CARDIAC REHAB | Facility: HOSPITAL | Age: 78
End: 2018-01-08
Attending: INTERNAL MEDICINE
Payer: MEDICARE

## 2018-01-08 PROCEDURE — 93798 PHYS/QHP OP CAR RHAB W/ECG: CPT

## 2018-01-09 ENCOUNTER — MYAURORA ACCOUNT LINK (OUTPATIENT)
Dept: OTHER | Age: 78
End: 2018-01-09

## 2018-01-09 ENCOUNTER — PRIOR ORIGINAL RECORDS (OUTPATIENT)
Dept: OTHER | Age: 78
End: 2018-01-09

## 2018-01-12 ENCOUNTER — CARDPULM VISIT (OUTPATIENT)
Dept: CARDIAC REHAB | Facility: HOSPITAL | Age: 78
End: 2018-01-12
Attending: FAMILY MEDICINE
Payer: MEDICARE

## 2018-01-12 PROCEDURE — 93798 PHYS/QHP OP CAR RHAB W/ECG: CPT

## 2018-01-15 ENCOUNTER — CARDPULM VISIT (OUTPATIENT)
Dept: CARDIAC REHAB | Facility: HOSPITAL | Age: 78
End: 2018-01-15
Attending: INTERNAL MEDICINE
Payer: MEDICARE

## 2018-01-15 PROCEDURE — 93798 PHYS/QHP OP CAR RHAB W/ECG: CPT

## 2018-01-17 ENCOUNTER — CARDPULM VISIT (OUTPATIENT)
Dept: CARDIAC REHAB | Facility: HOSPITAL | Age: 78
End: 2018-01-17
Attending: INTERNAL MEDICINE
Payer: MEDICARE

## 2018-01-17 PROCEDURE — 93798 PHYS/QHP OP CAR RHAB W/ECG: CPT

## 2018-01-19 ENCOUNTER — CARDPULM VISIT (OUTPATIENT)
Dept: CARDIAC REHAB | Facility: HOSPITAL | Age: 78
End: 2018-01-19
Attending: INTERNAL MEDICINE
Payer: MEDICARE

## 2018-01-19 PROCEDURE — 93798 PHYS/QHP OP CAR RHAB W/ECG: CPT

## 2018-01-24 ENCOUNTER — APPOINTMENT (OUTPATIENT)
Dept: CARDIAC REHAB | Facility: HOSPITAL | Age: 78
End: 2018-01-24
Attending: INTERNAL MEDICINE
Payer: MEDICARE

## 2018-01-24 ENCOUNTER — APPOINTMENT (OUTPATIENT)
Dept: GENERAL RADIOLOGY | Facility: HOSPITAL | Age: 78
DRG: 291 | End: 2018-01-24
Attending: EMERGENCY MEDICINE
Payer: MEDICARE

## 2018-01-24 ENCOUNTER — APPOINTMENT (OUTPATIENT)
Dept: ULTRASOUND IMAGING | Facility: HOSPITAL | Age: 78
DRG: 291 | End: 2018-01-24
Attending: INTERNAL MEDICINE
Payer: MEDICARE

## 2018-01-24 ENCOUNTER — HOSPITAL ENCOUNTER (INPATIENT)
Facility: HOSPITAL | Age: 78
LOS: 9 days | Discharge: HOME HEALTH | DRG: 291 | End: 2018-02-02
Attending: EMERGENCY MEDICINE | Admitting: HOSPITALIST
Payer: MEDICARE

## 2018-01-24 DIAGNOSIS — I31.1 CHRONIC CONSTRICTIVE PERICARDITIS, UNSPECIFIED TYPE: Primary | ICD-10-CM

## 2018-01-24 DIAGNOSIS — J44.1 COPD EXACERBATION (HCC): ICD-10-CM

## 2018-01-24 PROBLEM — R73.9 HYPERGLYCEMIA: Status: ACTIVE | Noted: 2018-01-24

## 2018-01-24 PROBLEM — R79.89 AZOTEMIA: Status: ACTIVE | Noted: 2018-01-24

## 2018-01-24 PROBLEM — E87.3 METABOLIC ALKALOSIS: Status: ACTIVE | Noted: 2018-01-24

## 2018-01-24 PROBLEM — D64.9 ANEMIA: Status: ACTIVE | Noted: 2018-01-24

## 2018-01-24 PROBLEM — E87.6 HYPOKALEMIA: Status: ACTIVE | Noted: 2018-01-24

## 2018-01-24 LAB
ADENOVIRUS PCR:: NEGATIVE
ALBUMIN SERPL-MCNC: 2.8 G/DL (ref 3.5–4.8)
ALLENS TEST: POSITIVE
ALP LIVER SERPL-CCNC: 142 U/L (ref 45–117)
ALT SERPL-CCNC: 26 U/L (ref 17–63)
ARTERIAL BLD GAS O2 SATURATION: 95 % (ref 92–100)
ARTERIAL BLOOD GAS BASE EXCESS: 12
ARTERIAL BLOOD GAS HCO3: 37.4 MEQ/L (ref 22–26)
ARTERIAL BLOOD GAS PCO2: 53 MM HG (ref 35–45)
ARTERIAL BLOOD GAS PH: 7.47 (ref 7.35–7.45)
ARTERIAL BLOOD GAS PO2: 72 MM HG (ref 80–105)
AST SERPL-CCNC: 23 U/L (ref 15–41)
B PERT DNA SPEC QL NAA+PROBE: NEGATIVE
BASOPHILS # BLD AUTO: 0.03 X10(3) UL (ref 0–0.1)
BASOPHILS NFR BLD AUTO: 0.3 %
BILIRUB SERPL-MCNC: 0.7 MG/DL (ref 0.1–2)
BILIRUB UR QL STRIP.AUTO: NEGATIVE
BUN BLD-MCNC: 21 MG/DL (ref 8–20)
C PNEUM DNA SPEC QL NAA+PROBE: NEGATIVE
C-REACTIVE PROTEIN: 24.4 MG/DL (ref ?–1)
CALCIUM BLD-MCNC: 9.4 MG/DL (ref 8.3–10.3)
CALCULATED O2 SATURATION: 96 % (ref 92–100)
CARBOXYHEMOGLOBIN: 1.6 % SAT (ref 0–3)
CHLORIDE: 92 MMOL/L (ref 101–111)
CLARITY UR REFRACT.AUTO: CLEAR
CO2: 37 MMOL/L (ref 22–32)
CORONAVIRUS 229E PCR:: NEGATIVE
CORONAVIRUS HKU1 PCR:: NEGATIVE
CORONAVIRUS NL63 PCR:: NEGATIVE
CORONAVIRUS OC43 PCR:: NEGATIVE
CREAT BLD-MCNC: 1 MG/DL (ref 0.7–1.3)
D-DIMER: 7.23 UG/ML FEU (ref 0–0.49)
EOSINOPHIL # BLD AUTO: 0.03 X10(3) UL (ref 0–0.3)
EOSINOPHIL NFR BLD AUTO: 0.3 %
ERYTHROCYTE [DISTWIDTH] IN BLOOD BY AUTOMATED COUNT: 15.2 % (ref 11.5–16)
EXPIRATORY PRESSURE: 5 CM H2O
FIO2: 40 %
FLUAV RNA SPEC QL NAA+PROBE: NEGATIVE
FLUBV RNA SPEC QL NAA+PROBE: NEGATIVE
GLUCOSE BLD-MCNC: 127 MG/DL (ref 70–99)
GLUCOSE BLD-MCNC: 134 MG/DL (ref 65–99)
GLUCOSE BLD-MCNC: 213 MG/DL (ref 65–99)
GLUCOSE UR STRIP.AUTO-MCNC: NEGATIVE MG/DL
HCT VFR BLD AUTO: 33.6 % (ref 37–53)
HGB BLD-MCNC: 10.3 G/DL (ref 13–17)
IMMATURE GRANULOCYTE COUNT: 0.05 X10(3) UL (ref 0–1)
IMMATURE GRANULOCYTE RATIO %: 0.5 %
INR BLD: 1.38 (ref 0.89–1.11)
INSP PRESSURE: 15 CM H2O
KETONES UR STRIP.AUTO-MCNC: NEGATIVE MG/DL
LEUKOCYTE ESTERASE UR QL STRIP.AUTO: NEGATIVE
LYMPHOCYTES # BLD AUTO: 0.38 X10(3) UL (ref 0.9–4)
LYMPHOCYTES NFR BLD AUTO: 3.9 %
M PROTEIN MFR SERPL ELPH: 8.5 G/DL (ref 6.1–8.3)
MCH RBC QN AUTO: 26.4 PG (ref 27–33.2)
MCHC RBC AUTO-ENTMCNC: 30.7 G/DL (ref 31–37)
MCV RBC AUTO: 86.2 FL (ref 80–99)
METAPNEUMOVIRUS PCR:: NEGATIVE
METHEMOGLOBIN: 0.4 % SAT (ref 0.4–1.5)
MONOCYTES # BLD AUTO: 0.87 X10(3) UL (ref 0.1–0.6)
MONOCYTES NFR BLD AUTO: 9 %
MYCOPLASMA PNEUMONIA PCR:: NEGATIVE
NEUTROPHIL ABS PRELIM: 8.29 X10 (3) UL (ref 1.3–6.7)
NEUTROPHILS # BLD AUTO: 8.29 X10(3) UL (ref 1.3–6.7)
NEUTROPHILS NFR BLD AUTO: 86 %
NITRITE UR QL STRIP.AUTO: NEGATIVE
P/F RATIO: 182.5 MMHG
PARAINFLUENZA 1 PCR:: NEGATIVE
PARAINFLUENZA 2 PCR:: NEGATIVE
PARAINFLUENZA 3 PCR:: NEGATIVE
PARAINFLUENZA 4 PCR:: NEGATIVE
PATIENT TEMPERATURE: 98.1 F
PH UR STRIP.AUTO: 6 [PH] (ref 4.5–8)
PLATELET # BLD AUTO: 233 10(3)UL (ref 150–450)
POTASSIUM SERPL-SCNC: 3.3 MMOL/L (ref 3.6–5.1)
PRO-BETA NATRIURETIC PEPTIDE: 2087 PG/ML (ref ?–450)
PROCALCITONIN SERPL-MCNC: 0.11 NG/ML (ref ?–0.11)
PROT UR STRIP.AUTO-MCNC: NEGATIVE MG/DL
PSA SERPL DL<=0.01 NG/ML-MCNC: 16.6 SECONDS (ref 11.7–13.9)
RBC # BLD AUTO: 3.9 X10(6)UL (ref 3.8–5.8)
RBC UR QL AUTO: NEGATIVE
RED CELL DISTRIBUTION WIDTH-SD: 47.9 FL (ref 35.1–46.3)
RHINOVIRUS/ENTERO PCR:: NEGATIVE
RSV RNA SPEC QL NAA+PROBE: NEGATIVE
SED RATE-ML: 88 MM/HR (ref 0–12)
SODIUM SERPL-SCNC: 137 MMOL/L (ref 136–144)
SP GR UR STRIP.AUTO: 1.01 (ref 1–1.03)
TOTAL HEMOGLOBIN: 9.8 G/DL (ref 12.6–17.4)
TROPONIN: <0.046 NG/ML (ref ?–0.05)
TROPONIN: <0.046 NG/ML (ref ?–0.05)
UROBILINOGEN UR STRIP.AUTO-MCNC: <2 MG/DL
VENT RATE: 16 /MIN
WBC # BLD AUTO: 9.7 X10(3) UL (ref 4–13)

## 2018-01-24 PROCEDURE — 71045 X-RAY EXAM CHEST 1 VIEW: CPT | Performed by: EMERGENCY MEDICINE

## 2018-01-24 PROCEDURE — 99222 1ST HOSP IP/OBS MODERATE 55: CPT | Performed by: STUDENT IN AN ORGANIZED HEALTH CARE EDUCATION/TRAINING PROGRAM

## 2018-01-24 PROCEDURE — 93970 EXTREMITY STUDY: CPT | Performed by: INTERNAL MEDICINE

## 2018-01-24 RX ORDER — ONDANSETRON 2 MG/ML
4 INJECTION INTRAMUSCULAR; INTRAVENOUS EVERY 6 HOURS PRN
Status: DISCONTINUED | OUTPATIENT
Start: 2018-01-24 | End: 2018-01-24

## 2018-01-24 RX ORDER — FUROSEMIDE 10 MG/ML
40 INJECTION INTRAMUSCULAR; INTRAVENOUS ONCE
Status: DISCONTINUED | OUTPATIENT
Start: 2018-01-24 | End: 2018-01-24

## 2018-01-24 RX ORDER — IPRATROPIUM BROMIDE AND ALBUTEROL SULFATE 2.5; .5 MG/3ML; MG/3ML
3 SOLUTION RESPIRATORY (INHALATION)
Status: DISCONTINUED | OUTPATIENT
Start: 2018-01-24 | End: 2018-01-29

## 2018-01-24 RX ORDER — ACETAMINOPHEN 325 MG/1
650 TABLET ORAL EVERY 6 HOURS PRN
Status: DISCONTINUED | OUTPATIENT
Start: 2018-01-24 | End: 2018-02-02

## 2018-01-24 RX ORDER — DEXTROSE MONOHYDRATE 25 G/50ML
50 INJECTION, SOLUTION INTRAVENOUS
Status: DISCONTINUED | OUTPATIENT
Start: 2018-01-24 | End: 2018-02-02

## 2018-01-24 RX ORDER — ENOXAPARIN SODIUM 100 MG/ML
40 INJECTION SUBCUTANEOUS DAILY
Status: DISCONTINUED | OUTPATIENT
Start: 2018-01-24 | End: 2018-02-02

## 2018-01-24 RX ORDER — IPRATROPIUM BROMIDE AND ALBUTEROL SULFATE 2.5; .5 MG/3ML; MG/3ML
3 SOLUTION RESPIRATORY (INHALATION) ONCE
Status: COMPLETED | OUTPATIENT
Start: 2018-01-24 | End: 2018-01-24

## 2018-01-24 RX ORDER — ALBUTEROL SULFATE 2.5 MG/3ML
2.5 SOLUTION RESPIRATORY (INHALATION) EVERY 6 HOURS
Status: DISCONTINUED | OUTPATIENT
Start: 2018-01-24 | End: 2018-01-24

## 2018-01-24 RX ORDER — FLUTICASONE PROPIONATE AND SALMETEROL 232; 14 UG/1; UG/1
1 POWDER, METERED RESPIRATORY (INHALATION) 2 TIMES DAILY
Status: DISCONTINUED | OUTPATIENT
Start: 2018-01-24 | End: 2018-01-24

## 2018-01-24 RX ORDER — DOCUSATE SODIUM 100 MG/1
100 CAPSULE, LIQUID FILLED ORAL EVERY MORNING
COMMUNITY

## 2018-01-24 RX ORDER — ALBUTEROL SULFATE 2.5 MG/3ML
2.5 SOLUTION RESPIRATORY (INHALATION) EVERY 2 HOUR PRN
Status: DISCONTINUED | OUTPATIENT
Start: 2018-01-24 | End: 2018-02-02

## 2018-01-24 RX ORDER — ASPIRIN 81 MG/1
81 TABLET ORAL DAILY
Status: DISCONTINUED | OUTPATIENT
Start: 2018-01-24 | End: 2018-02-02

## 2018-01-24 RX ORDER — GABAPENTIN 300 MG/1
900 CAPSULE ORAL 2 TIMES DAILY
Status: DISCONTINUED | OUTPATIENT
Start: 2018-01-24 | End: 2018-02-02

## 2018-01-24 RX ORDER — POTASSIUM CHLORIDE 20 MEQ/1
40 TABLET, EXTENDED RELEASE ORAL EVERY 4 HOURS
Status: COMPLETED | OUTPATIENT
Start: 2018-01-24 | End: 2018-01-25

## 2018-01-24 RX ORDER — ALBUTEROL SULFATE 2.5 MG/3ML
2.5 SOLUTION RESPIRATORY (INHALATION) EVERY 6 HOURS PRN
COMMUNITY
End: 2018-02-02

## 2018-01-24 RX ORDER — DOCUSATE SODIUM 100 MG/1
100 CAPSULE, LIQUID FILLED ORAL 2 TIMES DAILY
Status: DISCONTINUED | OUTPATIENT
Start: 2018-01-24 | End: 2018-02-02

## 2018-01-24 NOTE — CONSULTS
[facility]  Report of Consultation    Carroll James Patient Status:  Emergency    1940 MRN [id number]   Location [address] Attending Karla Angelo MD   Hosp Day # 0 PCP Brain Lindquist MD     Reason for Co Daisy Howell MD;  Location: [facility]   07/21/2017: VALVE REPAIR      Comment: [facility] in [place], [place]  01/30/2015: VALVE REPLACEMENT      Comment: AVR at [address] [facility]  Family History   Problem Relation Age of Onset   • Heart Disease Father    • Cirrhosis Terrence Arias Intake/Output:  No intake or output data in the 24 hours ending 01/24/18 5363    Physical Exam:   General: alert, cooperative, oriented. No respiratory distress. Head: Normocephalic, without obvious abnormality, atraumatic.    Eyes: Conjunctivae/corn been ordered.   · Status post aortic valve replacement with persistent aortic insufficiency  · Echocardiography findings consistent with constrictive pericarditis affecting the right ventricle  · COPD  · SEBLE  · Type 2 diabetes mellitus  · Essential hyperten

## 2018-01-24 NOTE — ED INITIAL ASSESSMENT (HPI)
MARQUES for 2 days. Family with patient. Patient said he is scheduled to have surgery for his pericarditis on 2/6/18. Was supposed to see Dr. Verona Patel (pulmonologist) today, but patient said he was too SOB to go there.  Patient on continuous home O2 at 8L via [place]

## 2018-01-24 NOTE — ED NOTES
Report given to [facility], RN, admit bed is not ready at this time but RN will call when bed is ready.

## 2018-01-24 NOTE — HISTORICAL OFFICE NOTE
Acosta Lal  : 1940  ACCOUNT:  [id number]  [phone]  PCP: Dr. Ilya Palacios     TODAY'S DATE: 2018  DICTATED BY:  ARTHUR Palencia]      CHIEF COMPLAINT: [Followup of Dyspnea, Followup of Pericarditis and constrictive.]    HPI: dyslipidemia, hypertension, diabetes type II, pericarditis, PVD, aortic stenosis, aortic insuffiency, aortic tube graft w/re-incorporation L renal artery, cardiac catheterization, left right cardiac catheterization June 2017 and CRISTOBAL    FAMILY HISTORY: Souleymane Kramer increase in his oxygen demand. Patient was taken over to Dr. Chase Sharma office on the way out today to schedule an appointment. ASSESSMENT:  1. Aortic stenosis  2. Dyspnea  3. Aneurysm, abdominal  4. Aortic insufficiency  5.  COPD  6. DM, Type II, with PV di overall functional status and exertional dyspnea has progressed. His increasing oxygen requirements. He had a chest x-ray performed on 1116 that revealed stable bilateral pleural effusions with stable airspace disease in the lower lungs.     He was supp tobacco use. quit 2005. CAFFEINE: >5 cups daily. ALCOHOL: drinks 1-2 per day and wine 1-2 per day. EXERCISE: cardiac rehab. DIET: no special diet and low carb. MARITAL STATUS: . EDUCATION: high school graduate. OCCUPATION: .      VIT Danyel Nolasco, a 78-year-old male, presented with dyspnea, severe.]    This is a follow-up visit for Leora Mackey. A lot has transpired since I last saw him.   He went up to the [facility] for an evaluation and underwent percutaneous treatment for his perivalvular leak rashes, lesions. MS: no limiting arthritis. NEURO: no localized deficits. HEM/LYMPH: easy bruising. ALL: no new food or environmental allergies.       PAST HISTORY: prostatitis, COPD, nephrolithiasis, neuropathy, SEBLE, pleural effusion 5/2015, cholecystectom oriented to time, place and person and normal affect. CV: PALP: PMI not displaced, no lifts and thrills or rub. AUSC:  regular rhythm, normal S1, S2 without S3; no AI by my exam today.  CAROTIDS: carotid pulses normal. ABD AORTA: difficult to assess ab 08/18/17 Gabapentin            300MG     1 po twice daily                         08/18/17 Indomethacin          25MG      1 po tid                                 08/18/17 ProAir HFA            108 (90   as directed                              08

## 2018-01-24 NOTE — ED PROVIDER NOTES
Patient Seen in: [facility] Emergency Department    History   Patient presents with:  Dyspnea ABY SOB (respiratory)    Stated Complaint: aby    HPI    42-year-old male with a complex medical history he has had progressive hypoxia with a combination replacement  4/1/2016: REVISE MEDIAN N/CARPAL TUNNEL SURG Left      Comment: Procedure: CARPAL TUNNEL RELEASE;  Surgeon:                Mik Andrew MD;  Location: [place] [address]  07/21/2017: VALVE REPAIR      Comment: [facility] in [place], [place]  01/30/2015: Albumin 2.8 (*)     Potassium 3.3 (*)     Chloride 92 (*)     CO2 37.0 (*)     All other components within normal limits   PRO BETA NATRIURETIC PEPTIDE - Abnormal; Notable for the following:     Pro-Beta Natriuretic Peptide 2,087 (*)     All other componen TROPONIN I - Normal   RESPIRATORY PANEL FLU EXPANDED - Normal   URINALYSIS WITH CULTURE REFLEX   CBC WITH DIFFERENTIAL WITH PLATELET    Narrative: The following orders were created for panel order CBC WITH DIFFERENTIAL WITH PLATELET.   Procedure (primary encounter diagnosis)  COPD exacerbation ([facility]  Progressive dyspnea with hypoxia  Disposition:  Admit  1/24/2018  3:20 pm    Follow-up:  No follow-up provider specified.       Medications Prescribed:  Current Discharge Medication List        Present

## 2018-01-24 NOTE — H&P
[facility]  History and Physical     Kenia Ludwig Patient Status:  Emergency    1940 MRN [id number]   Location [address] [facility] Attending Sam Dorsey MD   Hosp Day # 0 PCP Sofi Urbina MD     Chief Compl replacement  4/1/2016: REVISE MEDIAN N/CARPAL TUNNEL SURG Left      Comment: Procedure: CARPAL TUNNEL RELEASE;  Surgeon:                Nina Hernández MD;  Location: [place] [address]  07/21/2017: VALVE REPAIR      Comment: [facility] in [place], [place]  01/30/2015: MG Oral Tab Take 1 tablet (2 mg total) by mouth 2 (two) times daily. Disp: 180 tablet Rfl: 0   torsemide 20 MG Oral Tab Take 2 tablets (40 mg total) by mouth daily.  Disp: 180 tablet Rfl: 0   Tiotropium Bromide Monohydrate (SPIRIVA RESPIMAT) 2.5 MCG/ACT Inh <0.046       Imaging: Imaging data reviewed in Epic. ASSESSMENT / PLAN:     1. Dyspnea due to pneumonia and fluid overload   1. Trend CE  2. Diuresis   3. Abx   4. Cards and Pulm on Cs  5. Elevated D Dimer   2. Health care acquired pneumonia   1.  Cont

## 2018-01-25 ENCOUNTER — APPOINTMENT (OUTPATIENT)
Dept: CV DIAGNOSTICS | Facility: HOSPITAL | Age: 78
DRG: 291 | End: 2018-01-25
Attending: INTERNAL MEDICINE
Payer: MEDICARE

## 2018-01-25 ENCOUNTER — APPOINTMENT (OUTPATIENT)
Dept: GENERAL RADIOLOGY | Facility: HOSPITAL | Age: 78
DRG: 291 | End: 2018-01-25
Attending: INTERNAL MEDICINE
Payer: MEDICARE

## 2018-01-25 LAB
ALBUMIN SERPL-MCNC: 2.5 G/DL (ref 3.5–4.8)
ALP LIVER SERPL-CCNC: 138 U/L (ref 45–117)
ALT SERPL-CCNC: 28 U/L (ref 17–63)
AST SERPL-CCNC: 27 U/L (ref 15–41)
ATRIAL RATE: 81 BPM
BASOPHILS # BLD AUTO: 0.02 X10(3) UL (ref 0–0.1)
BASOPHILS NFR BLD AUTO: 0.3 %
BILIRUB SERPL-MCNC: 0.8 MG/DL (ref 0.1–2)
BUN BLD-MCNC: 19 MG/DL (ref 8–20)
CALCIUM BLD-MCNC: 9.1 MG/DL (ref 8.3–10.3)
CHLORIDE: 98 MMOL/L (ref 101–111)
CO2: 37 MMOL/L (ref 22–32)
CREAT BLD-MCNC: 1.14 MG/DL (ref 0.7–1.3)
EOSINOPHIL # BLD AUTO: 0.02 X10(3) UL (ref 0–0.3)
EOSINOPHIL NFR BLD AUTO: 0.3 %
ERYTHROCYTE [DISTWIDTH] IN BLOOD BY AUTOMATED COUNT: 15.4 % (ref 11.5–16)
GLUCOSE BLD-MCNC: 153 MG/DL (ref 65–99)
GLUCOSE BLD-MCNC: 163 MG/DL (ref 70–99)
GLUCOSE BLD-MCNC: 186 MG/DL (ref 65–99)
GLUCOSE BLD-MCNC: 223 MG/DL (ref 65–99)
GLUCOSE BLD-MCNC: 227 MG/DL (ref 65–99)
HAV IGM SER QL: 2.1 MG/DL (ref 1.7–3)
HCT VFR BLD AUTO: 30.9 % (ref 37–53)
HGB BLD-MCNC: 9.6 G/DL (ref 13–17)
IMMATURE GRANULOCYTE COUNT: 0.07 X10(3) UL (ref 0–1)
IMMATURE GRANULOCYTE RATIO %: 0.9 %
LYMPHOCYTES # BLD AUTO: 0.38 X10(3) UL (ref 0.9–4)
LYMPHOCYTES NFR BLD AUTO: 5 %
M PROTEIN MFR SERPL ELPH: 7.8 G/DL (ref 6.1–8.3)
MCH RBC QN AUTO: 26.5 PG (ref 27–33.2)
MCHC RBC AUTO-ENTMCNC: 31.1 G/DL (ref 31–37)
MCV RBC AUTO: 85.4 FL (ref 80–99)
MONOCYTES # BLD AUTO: 0.68 X10(3) UL (ref 0.1–0.6)
MONOCYTES NFR BLD AUTO: 9 %
NEUTROPHIL ABS PRELIM: 6.42 X10 (3) UL (ref 1.3–6.7)
NEUTROPHILS # BLD AUTO: 6.42 X10(3) UL (ref 1.3–6.7)
NEUTROPHILS NFR BLD AUTO: 84.5 %
P AXIS: 51 DEGREES
P-R INTERVAL: 186 MS
PHOSPHATE SERPL-MCNC: 2.3 MG/DL (ref 2.5–4.9)
PLATELET # BLD AUTO: 212 10(3)UL (ref 150–450)
POTASSIUM SERPL-SCNC: 4 MMOL/L (ref 3.6–5.1)
POTASSIUM SERPL-SCNC: 4 MMOL/L (ref 3.6–5.1)
Q-T INTERVAL: 432 MS
QRS DURATION: 162 MS
QTC CALCULATION (BEZET): 501 MS
R AXIS: -51 DEGREES
RBC # BLD AUTO: 3.62 X10(6)UL (ref 3.8–5.8)
RED CELL DISTRIBUTION WIDTH-SD: 48.5 FL (ref 35.1–46.3)
SODIUM SERPL-SCNC: 140 MMOL/L (ref 136–144)
T AXIS: 24 DEGREES
TROPONIN: <0.046 NG/ML (ref ?–0.05)
VENTRICULAR RATE: 81 BPM
WBC # BLD AUTO: 7.6 X10(3) UL (ref 4–13)

## 2018-01-25 PROCEDURE — 99233 SBSQ HOSP IP/OBS HIGH 50: CPT | Performed by: STUDENT IN AN ORGANIZED HEALTH CARE EDUCATION/TRAINING PROGRAM

## 2018-01-25 PROCEDURE — 71045 X-RAY EXAM CHEST 1 VIEW: CPT | Performed by: INTERNAL MEDICINE

## 2018-01-25 PROCEDURE — 93306 TTE W/DOPPLER COMPLETE: CPT | Performed by: INTERNAL MEDICINE

## 2018-01-25 RX ORDER — FUROSEMIDE 10 MG/ML
40 INJECTION INTRAMUSCULAR; INTRAVENOUS
Status: DISCONTINUED | OUTPATIENT
Start: 2018-01-25 | End: 2018-01-29

## 2018-01-25 NOTE — PROGRESS NOTES
NURSING ADMISSION NOTE      Patient admitted via ED  Oriented to room. Safety precautions initiated. Bed in low position. Call light in reach.     Assumed care of patient at 1730  AOx4, received on 6L NC- O2 sats 94-96%, NSR on tele  SOB- fatigues ea

## 2018-01-25 NOTE — PROGRESS NOTES
[facility]  Progress Note    Javed Peterson Patient Status:  Inpatient    1940 MRN [id number]   [facility] 7NE-A Attending Celine Lopez MD   [facility] Day # 1 PCP Yessica Costa MD     Subjective:  Javed Peterson is a(n) 69 y NEPRELIM  8.29*  6.42   WBC  9.7  7.6   PLT  233.0  212.0     Recent Labs   Lab  01/24/18   1425  01/25/18   0554   GLU  127*  163*   BUN  21*  19   CREATSERUM  1.00  1.14   CA  9.4  9.1   NA  137  140   K  3.3*  4.0  4.0   CL  92*  98*   CO2  37.0*  37.

## 2018-01-25 NOTE — PROGRESS NOTES
01/25/18 7316   Clinical Encounter Type   Visited With Patient   Routine Visit (Responded to the Advanced Directive consult)   Continue Visiting ( remain available at the pager # [phone] as requested/needed)   Patient's Supportive Strategies/Resour

## 2018-01-25 NOTE — RESPIRATORY THERAPY NOTE
SEBLE Equipment Usage Summary :            Set Mode : CPAP W FLEX          Usage in Hours:2;58          90% Pressure (EPAP) : 10           90% Insp Pressure (IPAP);           AHI : 0.3          Supplemental Oxygen :   5   LPM

## 2018-01-25 NOTE — PROGRESS NOTES
[facility]  Progress Note     Lilianyissel Hernandez Patient Status:  Inpatient    1940 MRN [id number]   [facility] 7NE-A Attending Eris Powell MD   Hosp Day # 1 PCP Ubaldo Lino MD     Chief Complaint: SOB     S: Patient  F Intravenous Q12H   • ipratropium-albuterol  3 mL Nebulization 6 times per day   • aspirin  81 mg Oral Daily   • docusate sodium  100 mg Oral BID   • gabapentin  900 mg Oral BID   • azithromycin  500 mg Intravenous Q24H   • enoxaparin  40 mg Subcutaneous Da

## 2018-01-25 NOTE — CM/SW NOTE
01/25/18 1500   CM/SW Referral Data   Referral Source Physician   Reason for Referral Discharge planning   Informant Spouse; Children   Pertinent Medical Hx   Primary Care Physician Name dr Omaira Cadena Drug/Alcohol Use n   Ma

## 2018-01-25 NOTE — PLAN OF CARE
AOx4, VSS on 6L NC while awake, CPAP while sleeping  NSR per tele  No signs of respiratory distress noted or reported. Kulwant LE Doppler, (-) DVT  Due medications given, needs attended, will continue to monitor.     CARDIOVASCULAR - ADULT    • Maintains optim

## 2018-01-25 NOTE — CONSULTS
Monika Morrisonsins  1/28/1940    Reason for consult: CHF      HPI:   69year old with h/o of COPD, presumed diagnosis of constrictive pericarditis, with recent LHC, CRISTOBAL, and echo imaging, with anticipated chest surgery in February.   Over the last 2-1/2 days Packs/day  For 50.00 Years     Types: Cigarettes    Quit date: 12/31/2005    Smokeless tobacco: Never Used    Alcohol use Yes  8.4 oz/week    14 Glasses of wine per week         Comment: Red wine - about two glasses a day    Drug use: No    Sexual activity diast m   Lungs: b/l rales   Abd: Soft , NT. distended BS+   Ext: No C/C/E      Lab Results  Component Value Date   WBC 9.7 01/24/2018   HGB 10.3 01/24/2018   HCT 33.6 01/24/2018   .0 01/24/2018   CREATSERUM 1.00 01/24/2018   BUN 21 01/24/2018   NA bilateral basilar opacities, left greater than right, suggestive of pneumonia. Followup chest radiograph after appropriate treatment is recommended to document resolution.  2.  Nodular opacity within the right lung base which corresponds with finding within mean pulmonary capillary wedge pressure 27. Simultaneous left ventricular and right ventricular pressure tracings were performed. The diastolic curves were virtually superimposable with equal end-diastolic pressures approximately 18-20 mmHg.    No signif

## 2018-01-25 NOTE — PROGRESS NOTES
Seen and examined. Reviewed with patient and his family.     Breathing a bit easier today at rest. Afebrile  109/63   86 regular    Lungs decreased BS bases  Ht RRR no AI  abd protuberant - s/o ascites  Ext no edema    CXR with patchy bilateral ASD and effu

## 2018-01-26 ENCOUNTER — APPOINTMENT (OUTPATIENT)
Dept: CARDIAC REHAB | Facility: HOSPITAL | Age: 78
End: 2018-01-26
Attending: INTERNAL MEDICINE
Payer: MEDICARE

## 2018-01-26 ENCOUNTER — APPOINTMENT (OUTPATIENT)
Dept: GENERAL RADIOLOGY | Facility: HOSPITAL | Age: 78
DRG: 291 | End: 2018-01-26
Attending: INTERNAL MEDICINE
Payer: MEDICARE

## 2018-01-26 LAB
BUN BLD-MCNC: 17 MG/DL (ref 8–20)
CALCIUM BLD-MCNC: 9 MG/DL (ref 8.3–10.3)
CHLORIDE: 95 MMOL/L (ref 101–111)
CO2: 37 MMOL/L (ref 22–32)
CREAT BLD-MCNC: 1.05 MG/DL (ref 0.7–1.3)
GLUCOSE BLD-MCNC: 171 MG/DL (ref 70–99)
GLUCOSE BLD-MCNC: 190 MG/DL (ref 65–99)
GLUCOSE BLD-MCNC: 247 MG/DL (ref 65–99)
LEGIONELLA PNEUMOPHILA AG, URI: NEGATIVE
POTASSIUM SERPL-SCNC: 3.6 MMOL/L (ref 3.6–5.1)
SODIUM SERPL-SCNC: 137 MMOL/L (ref 136–144)
STREPTOCOCCUS PNEUMONIAE AG, U: NEGATIVE

## 2018-01-26 PROCEDURE — 99232 SBSQ HOSP IP/OBS MODERATE 35: CPT | Performed by: STUDENT IN AN ORGANIZED HEALTH CARE EDUCATION/TRAINING PROGRAM

## 2018-01-26 PROCEDURE — 71045 X-RAY EXAM CHEST 1 VIEW: CPT | Performed by: INTERNAL MEDICINE

## 2018-01-26 RX ORDER — POTASSIUM CHLORIDE 20 MEQ/1
40 TABLET, EXTENDED RELEASE ORAL EVERY 4 HOURS
Status: COMPLETED | OUTPATIENT
Start: 2018-01-26 | End: 2018-01-26

## 2018-01-26 NOTE — PLAN OF CARE
Assumed care at 0730. A&Ox4. NSR on tele. 6L O2 nasal cannula. Voiding per urinial. Denies pain. Will continue to monitor.     CARDIOVASCULAR - ADULT    • Maintains optimal cardiac output and hemodynamic stability Progressing        Patient/Family Goals

## 2018-01-26 NOTE — CDS QUERY
Pneumonia Specificity  CLINICAL DOCUMENTATION CLARIFICATION FORM    Dear Doctor:  Clinical information (provided below) indicates pneumonia.  For accurate ICD-10-CM code assignment to reflect severity of illness and risk of mortality,   PLEASE INDICATE THE

## 2018-01-26 NOTE — PROGRESS NOTES
[facility]  Progress Note    Shwetha Humphries Patient Status:  Inpatient    1940 MRN [id number]   [facility] 7NE-A Attending Alexei Haney MD   [facility] Day # 2 PCP Cuong Lomeli MD     Subjective:  Shwetha Humphries is a(n) 69 y Essential hypertension     Pure hypercholesterolemia     SEBLE on CPAP     S/P AVR (aortic valve replacement)     Perennial non-allergic rhinitis     Pleural effusion     Restrictive pericarditis     Aortic insufficiency     Diabetic polyneuropathy associate

## 2018-01-26 NOTE — PROGRESS NOTES
· [facility] Progress Note     Subjective:  Feels breathing is improving daily    Objective:  115/66  Afebrile  SR   I/O - 1700    BUN/cr 17/1.05        Cardiac: S1 S2 regular  Lungs:  Decreased BS bases  Abdomen: soft  Extremities: mildly dis

## 2018-01-26 NOTE — CM/SW NOTE
Pt needing HH. Referral made to [facility] - she saw pt and accepted him.

## 2018-01-26 NOTE — PROGRESS NOTES
[facility]  Progress Note     Sam Goodwin Patient Status:  Inpatient    1940 MRN [id number]   [facility] 7NE-A Attending Tita Galicia MD   Hosp Day # 2 PCP Makayla Brink MD     Chief Complaint: SOB     S: Patient do Intravenous Q12H   • ipratropium-albuterol  3 mL Nebulization 6 times per day   • aspirin  81 mg Oral Daily   • docusate sodium  100 mg Oral BID   • gabapentin  900 mg Oral BID   • azithromycin  500 mg Intravenous Q24H   • enoxaparin  40 mg Subcutaneous Da

## 2018-01-26 NOTE — HOME CARE LIAISON
MET WITH PTNT TO DISCUSS HOME HEALTH SERVICES AND COVERAGE CRITERIA. PTNT AGREEABLE TO Oz Decker. PTNT GIVEN RESIDENTIAL BROCHURE. RESIDENTIAL WITH PROVIDE SN/PT ON DISCHARGE.     Thank you for this referral,   Will Torres

## 2018-01-27 ENCOUNTER — APPOINTMENT (OUTPATIENT)
Dept: GENERAL RADIOLOGY | Facility: HOSPITAL | Age: 78
DRG: 291 | End: 2018-01-27
Attending: INTERNAL MEDICINE
Payer: MEDICARE

## 2018-01-27 LAB
BUN BLD-MCNC: 21 MG/DL (ref 8–20)
CALCIUM BLD-MCNC: 9.1 MG/DL (ref 8.3–10.3)
CHLORIDE: 98 MMOL/L (ref 101–111)
CO2: 35 MMOL/L (ref 22–32)
CREAT BLD-MCNC: 1.02 MG/DL (ref 0.7–1.3)
GLUCOSE BLD-MCNC: 151 MG/DL (ref 65–99)
GLUCOSE BLD-MCNC: 170 MG/DL (ref 70–99)
GLUCOSE BLD-MCNC: 182 MG/DL (ref 65–99)
GLUCOSE BLD-MCNC: 183 MG/DL (ref 65–99)
GLUCOSE BLD-MCNC: 184 MG/DL (ref 65–99)
GLUCOSE BLD-MCNC: 210 MG/DL (ref 65–99)
POTASSIUM SERPL-SCNC: 4 MMOL/L (ref 3.6–5.1)
SODIUM SERPL-SCNC: 137 MMOL/L (ref 136–144)

## 2018-01-27 PROCEDURE — 99232 SBSQ HOSP IP/OBS MODERATE 35: CPT | Performed by: STUDENT IN AN ORGANIZED HEALTH CARE EDUCATION/TRAINING PROGRAM

## 2018-01-27 PROCEDURE — 71045 X-RAY EXAM CHEST 1 VIEW: CPT | Performed by: INTERNAL MEDICINE

## 2018-01-27 RX ORDER — POLYETHYLENE GLYCOL 3350 17 G/17G
17 POWDER, FOR SOLUTION ORAL DAILY PRN
Status: DISCONTINUED | OUTPATIENT
Start: 2018-01-27 | End: 2018-02-02

## 2018-01-27 NOTE — PROGRESS NOTES
[facility]  Progress Note    Estrada Grande Patient Status:  Inpatient    1940 MRN [id number]   [facility] 7NE-A Attending Sapna Torres MD   [address] Day # 3 PCP Crissy Hokpins MD     Subjective:  Estrada Grande is a(n) 69 y unchanged    Medications reviewed     Assessment and Plan:  · Suspected viral syndrome with worsening dyspnea/hypoxia; improved  · Status post aortic valve replacement with persistent aortic insufficiency- s/p 'clip' of periaortic valve leak   · Echocardio

## 2018-01-27 NOTE — PLAN OF CARE
Pt A/O X4. RA. Denies SOB or difficulty breathing. NSR on tele. Voids per urinal. Denies pain. Pt is sleeping comfortably. Will continue to monitor.      CARDIOVASCULAR - ADULT    • Maintains optimal cardiac output and hemodynamic stability Progressing

## 2018-01-27 NOTE — PROGRESS NOTES
· [facility] Progress Note     Subjective:  Up in chair, breathing a bit better, abdomen softer    Objective:  118/62  Afebrile  SR  I/O -1073      BUN/cr  21/1.02        Cardiac: S1 S2 regular  Lungs:  Decreased BS bases  Abdomen: soft  Extre

## 2018-01-27 NOTE — PROGRESS NOTES
[facility]  Progress Note     Amie Amyshukri Patient Status:  Inpatient    1940 MRN [id number]   [facility] 7NE-A Attending Travon Morrison MD   [facility] Day # 3 PCP Iline Felty, MD     Chief Complaint: SOB     S: Patient co piperacillin-tazobactam  3.375 g Intravenous Q8H   • doxycycline  100 mg Intravenous Q12H   • ipratropium-albuterol  3 mL Nebulization 6 times per day   • aspirin  81 mg Oral Daily   • docusate sodium  100 mg Oral BID   • gabapentin  900 mg Oral BID   • en

## 2018-01-27 NOTE — PLAN OF CARE
Pt alert and oriented x 4, breathing shallow on 6L NC , denies short of breath or chest pain. Dyspnea on exertion. Is on IV Zosyn and IV Doxy.      CARDIOVASCULAR - ADULT    • Maintains optimal cardiac output and hemodynamic stability Progressing

## 2018-01-27 NOTE — PLAN OF CARE
Assumed care. A&Ox4. NSR on tele. Stating 98% O2 on 6L. Denies pain. Voiding per urinal. K replaced per protocol. Will continue to monitor.       CARDIOVASCULAR - ADULT    • Maintains optimal cardiac output and hemodynamic stability Progressing        Patie

## 2018-01-28 LAB
ANGIOTENSIN CONVERTING ENZYME: 20 U/L
BASOPHILS # BLD AUTO: 0.03 X10(3) UL (ref 0–0.1)
BASOPHILS NFR BLD AUTO: 0.6 %
BUN BLD-MCNC: 21 MG/DL (ref 8–20)
CALCIUM BLD-MCNC: 8.9 MG/DL (ref 8.3–10.3)
CHLORIDE: 96 MMOL/L (ref 101–111)
CO2: 37 MMOL/L (ref 22–32)
CREAT BLD-MCNC: 1.01 MG/DL (ref 0.7–1.3)
EOSINOPHIL # BLD AUTO: 0.15 X10(3) UL (ref 0–0.3)
EOSINOPHIL NFR BLD AUTO: 2.8 %
ERYTHROCYTE [DISTWIDTH] IN BLOOD BY AUTOMATED COUNT: 15.2 % (ref 11.5–16)
GLUCOSE BLD-MCNC: 145 MG/DL (ref 65–99)
GLUCOSE BLD-MCNC: 146 MG/DL (ref 70–99)
GLUCOSE BLD-MCNC: 165 MG/DL (ref 65–99)
GLUCOSE BLD-MCNC: 174 MG/DL (ref 65–99)
GLUCOSE BLD-MCNC: 240 MG/DL (ref 65–99)
GLUCOSE BLD-MCNC: 243 MG/DL (ref 65–99)
HCT VFR BLD AUTO: 27.7 % (ref 37–53)
HGB BLD-MCNC: 8.5 G/DL (ref 13–17)
IMMATURE GRANULOCYTE COUNT: 0.04 X10(3) UL (ref 0–1)
IMMATURE GRANULOCYTE RATIO %: 0.7 %
LYMPHOCYTES # BLD AUTO: 0.38 X10(3) UL (ref 0.9–4)
LYMPHOCYTES NFR BLD AUTO: 7.1 %
MCH RBC QN AUTO: 25.7 PG (ref 27–33.2)
MCHC RBC AUTO-ENTMCNC: 30.7 G/DL (ref 31–37)
MCV RBC AUTO: 83.7 FL (ref 80–99)
MONOCYTES # BLD AUTO: 0.45 X10(3) UL (ref 0.1–0.6)
MONOCYTES NFR BLD AUTO: 8.3 %
NEUTROPHIL ABS PRELIM: 4.34 X10 (3) UL (ref 1.3–6.7)
NEUTROPHILS # BLD AUTO: 4.34 X10(3) UL (ref 1.3–6.7)
NEUTROPHILS NFR BLD AUTO: 80.5 %
PLATELET # BLD AUTO: 250 10(3)UL (ref 150–450)
POTASSIUM SERPL-SCNC: 3.5 MMOL/L (ref 3.6–5.1)
POTASSIUM SERPL-SCNC: 4.4 MMOL/L (ref 3.6–5.1)
RBC # BLD AUTO: 3.31 X10(6)UL (ref 3.8–5.8)
RED CELL DISTRIBUTION WIDTH-SD: 46.5 FL (ref 35.1–46.3)
SODIUM SERPL-SCNC: 137 MMOL/L (ref 136–144)
WBC # BLD AUTO: 5.4 X10(3) UL (ref 4–13)

## 2018-01-28 PROCEDURE — 99232 SBSQ HOSP IP/OBS MODERATE 35: CPT | Performed by: STUDENT IN AN ORGANIZED HEALTH CARE EDUCATION/TRAINING PROGRAM

## 2018-01-28 RX ORDER — POTASSIUM CHLORIDE 20 MEQ/1
40 TABLET, EXTENDED RELEASE ORAL EVERY 4 HOURS
Status: COMPLETED | OUTPATIENT
Start: 2018-01-28 | End: 2018-01-28

## 2018-01-28 NOTE — PROGRESS NOTES
[facility]  Progress Note     Amie Amyshukri Patient Status:  Inpatient    1940 MRN [id number]   [facility] 7NE-A Attending Travon Morrison MD   [facility] Day # 4 PCP Iline Felty, MD     Chief Complaint: SOB     S: Patient do Insulin Aspart Pen  1-5 Units Subcutaneous TID CC and HS   • Fluticasone Furoate-Vilanterol  1 puff Inhalation Daily       ASSESSMENT / PLAN:     1. Dyspnea due to pneumonia and fluid overload   1. Diuresis   2. Abx   3. Cards and Pulm on Cs  2.  Health car

## 2018-01-28 NOTE — PHYSICAL THERAPY NOTE
PHYSICAL THERAPY EVALUATION - INPATIENT     Room Number: 3406/2028-P  Evaluation Date: 1/28/2018  Type of Evaluation: Initial  Physician Order: PT Eval and Treat    Presenting Problem: STEVENSON; Pneumonia  Reason for Therapy: Mobility Dysfunction and Discha date: Unspecified intestinal obstruction  No date: Visual impairment    Past Surgical History  Past Surgical History:  06/16/2017: ANGIOGRAM      Comment: [facility]  1994: ARTHROSCOPY OF JOINT UNLISTED Left      Comment: knee  1980: CHOLECYSTECTOMY  2006: HERNIA except for the following:    Right Shoulder flexion WFL ; Strength 4+/5  Left Shoulder flexion WFL ; Strength 4+/5  Right Elbow flexion WNL ; Strength 4+/5  Left Elbow flexion WNL ; Strength 4+/5  Right Elbow extension WNL ; Strength 4+/5  Left Elbow extension CGA)  Distance (ft): 12, 100  Assistive Device: Rolling walker  Pattern: Comment (decreased step length B, ER of B LEs.)  Stoop/Curb Assistance: Not tested  Comment : above scores based on FIM per dept policy    Skilled Therapy Provided: Pt laying in bed u complexity is considered high. These impairments and comorbidities manifest themselves as functional limitations in independent bed mobility, transfers, gait and stair negotiation.   The patient is below baseline and would benefit from skilled inpatient PT

## 2018-01-28 NOTE — PLAN OF CARE
Worked with therapy today. Was up in chair today for several hours. Adequate urine return with lasix. Compliant with fluid restriction. Adequate PO intake. SOB with exertion. IV to right hand removed per patient request. LFA IV intact.      CARDIOVASCULAR -

## 2018-01-28 NOTE — PROGRESS NOTES
S Cardiology Progress Note    Subjective: No complaints.   Wants to move to chair    Objective:  /61 (BP Location: Right arm)   Pulse 76   Temp 97.8 °F (36.6 °C) (Oral)   Resp 22   Ht 6' 1\" (1.854 m)   Wt 199 lb 9.6 oz (90.5 kg)   SpO2 93%   BMI 26

## 2018-01-28 NOTE — PROGRESS NOTES
[facility]  Progress Note    Chang Torres Patient Status:  Inpatient    1940 MRN [id number]   [facility] 7NE-A Attending Joyce Goss MD   [facility] Day # 4 PCP Camilo Madrid MD     Subjective:  Chang Torres is a(n) 69 y valve replacement with persistent aortic insufficiency- s/p 'clip' of periaortic valve leak   · Echocardiography findings consistent with constrictive pericarditis affecting the right ventricle  · Progressive pleural thickening with zack chronic borderline

## 2018-01-29 ENCOUNTER — APPOINTMENT (OUTPATIENT)
Dept: GENERAL RADIOLOGY | Facility: HOSPITAL | Age: 78
DRG: 291 | End: 2018-01-29
Attending: INTERNAL MEDICINE
Payer: MEDICARE

## 2018-01-29 ENCOUNTER — APPOINTMENT (OUTPATIENT)
Dept: CARDIAC REHAB | Facility: HOSPITAL | Age: 78
End: 2018-01-29
Attending: INTERNAL MEDICINE
Payer: MEDICARE

## 2018-01-29 LAB
BASOPHILS # BLD AUTO: 0.03 X10(3) UL (ref 0–0.1)
BASOPHILS NFR BLD AUTO: 0.6 %
BUN BLD-MCNC: 22 MG/DL (ref 8–20)
CALCIUM BLD-MCNC: 8.9 MG/DL (ref 8.3–10.3)
CHLORIDE: 96 MMOL/L (ref 101–111)
CO2: 35 MMOL/L (ref 22–32)
CREAT BLD-MCNC: 1 MG/DL (ref 0.7–1.3)
EOSINOPHIL # BLD AUTO: 0.18 X10(3) UL (ref 0–0.3)
EOSINOPHIL NFR BLD AUTO: 3.6 %
ERYTHROCYTE [DISTWIDTH] IN BLOOD BY AUTOMATED COUNT: 15.2 % (ref 11.5–16)
GLUCOSE BLD-MCNC: 127 MG/DL (ref 65–99)
GLUCOSE BLD-MCNC: 143 MG/DL (ref 70–99)
GLUCOSE BLD-MCNC: 169 MG/DL (ref 65–99)
GLUCOSE BLD-MCNC: 174 MG/DL (ref 65–99)
GLUCOSE BLD-MCNC: 202 MG/DL (ref 65–99)
HCT VFR BLD AUTO: 28.4 % (ref 37–53)
HGB BLD-MCNC: 8.8 G/DL (ref 13–17)
IMMATURE GRANULOCYTE COUNT: 0.04 X10(3) UL (ref 0–1)
IMMATURE GRANULOCYTE RATIO %: 0.8 %
LYMPHOCYTES # BLD AUTO: 0.37 X10(3) UL (ref 0.9–4)
LYMPHOCYTES NFR BLD AUTO: 7.3 %
MCH RBC QN AUTO: 26.3 PG (ref 27–33.2)
MCHC RBC AUTO-ENTMCNC: 31 G/DL (ref 31–37)
MCV RBC AUTO: 85 FL (ref 80–99)
MONOCYTES # BLD AUTO: 0.37 X10(3) UL (ref 0.1–0.6)
MONOCYTES NFR BLD AUTO: 7.3 %
NEUTROPHIL ABS PRELIM: 4.06 X10 (3) UL (ref 1.3–6.7)
NEUTROPHILS # BLD AUTO: 4.06 X10(3) UL (ref 1.3–6.7)
NEUTROPHILS NFR BLD AUTO: 80.4 %
PLATELET # BLD AUTO: 254 10(3)UL (ref 150–450)
POTASSIUM SERPL-SCNC: 3.8 MMOL/L (ref 3.6–5.1)
RBC # BLD AUTO: 3.34 X10(6)UL (ref 3.8–5.8)
RED CELL DISTRIBUTION WIDTH-SD: 46.5 FL (ref 35.1–46.3)
SODIUM SERPL-SCNC: 137 MMOL/L (ref 136–144)
WBC # BLD AUTO: 5.1 X10(3) UL (ref 4–13)

## 2018-01-29 PROCEDURE — 99232 SBSQ HOSP IP/OBS MODERATE 35: CPT | Performed by: STUDENT IN AN ORGANIZED HEALTH CARE EDUCATION/TRAINING PROGRAM

## 2018-01-29 PROCEDURE — 71045 X-RAY EXAM CHEST 1 VIEW: CPT | Performed by: INTERNAL MEDICINE

## 2018-01-29 RX ORDER — FUROSEMIDE 10 MG/ML
40 INJECTION INTRAMUSCULAR; INTRAVENOUS 3 TIMES DAILY
Status: DISCONTINUED | OUTPATIENT
Start: 2018-01-29 | End: 2018-01-30

## 2018-01-29 RX ORDER — IPRATROPIUM BROMIDE AND ALBUTEROL SULFATE 2.5; .5 MG/3ML; MG/3ML
3 SOLUTION RESPIRATORY (INHALATION)
Status: DISCONTINUED | OUTPATIENT
Start: 2018-01-29 | End: 2018-02-02

## 2018-01-29 RX ORDER — SPIRONOLACTONE 25 MG/1
25 TABLET ORAL DAILY
Status: DISCONTINUED | OUTPATIENT
Start: 2018-01-29 | End: 2018-01-31

## 2018-01-29 RX ORDER — POTASSIUM CHLORIDE 20 MEQ/1
40 TABLET, EXTENDED RELEASE ORAL ONCE
Status: COMPLETED | OUTPATIENT
Start: 2018-01-29 | End: 2018-01-29

## 2018-01-29 NOTE — PLAN OF CARE
Dr. Gilbert Rubio notified that pt's left groin and left thigh dressings are saturated with no order to change or reinforce current dressings. Stated it was ok to remove and change dressings prn.

## 2018-01-29 NOTE — DIETARY NOTE
Nutrition Short Note    Dietitian consult received for optimizing po in preparing for surgery.  Healthy, high calorie/protein diet education and handout provided. Encouraged small frequent meals, protein/nutrient rich foods, supplement.  Pt says he drinks

## 2018-01-29 NOTE — PROGRESS NOTES
Seen and examined. Notes reviewed. Discussed weekend course with Dr. Letty Tee. Feels as if his overall breathing has improved.     Afebrile  144/67  79 regular    Lungs bronchial BS right base - crackles left base  Ht RRR  abd still somewhat firm  Ext no e

## 2018-01-29 NOTE — PLAN OF CARE
Assumed care at 1900. No acute issues overnight. Denies pain. 6L HF oxygen, sats >93%. Denies cough, sob. NSR on tele. IV abx as ordered. Vitals stable, afebrile.      CARDIOVASCULAR - ADULT    • Maintains optimal cardiac output and hemodynamic st

## 2018-01-29 NOTE — CM/SW NOTE
BPCI:  Met with patient at bedside to explain the BPCI/Medicare program. Patient agreed with phone f/u for 3 months from [address] [place] [address] after discharge from [facility]. Patient was enrolled under DRG    193 BPCI/Medicare Letter and Brochure provided.

## 2018-01-29 NOTE — PROGRESS NOTES
[facility]  Progress Note    [facility]    1940 MRN [id number]   [facility] 7NE-A Attending Vijaya Desai MD   [facility] Day # 5 PCP Zenaida Marie MD     Assessment and Plan:  1.  Suspected vi Transient alteration of awareness     Chronic respiratory failure with hypoxia (HCC)     Hyponatremia     Urinary urgency     Need for vaccination     Laceration of left elbow     Chronic constrictive pericarditis     Hypokalemia     Anemia     Azotemia 96*   CO2  35.0*  37.0*   --   35.0*     @MG@      Lab Results  Component Value Date   INR 1.38 (H) 01/24/2018   INR 1.24 (H) 12/11/2017   INR 1.09 08/04/2017        No results for input(s): TSH in the last 72 hours.     No results for input(s): ABGPHT, ABG from Last 1 Encounters:  No data found for PF        Joanne Angeles  1/29/2018  11:03 AM

## 2018-01-29 NOTE — PROGRESS NOTES
[facility]  Progress Note     Lilian Hernandez Patient Status:  Inpatient    1940 MRN [id number]   [facility] 7NE-A Attending Eris Powell MD   [facility] Day # 5 PCP Ubaldo Lino MD     Chief Complaint: SOB     S: Patient si BID   • gabapentin  900 mg Oral BID   • enoxaparin  40 mg Subcutaneous Daily   • Insulin Aspart Pen  1-5 Units Subcutaneous TID CC and HS   • Fluticasone Furoate-Vilanterol  1 puff Inhalation Daily       ASSESSMENT / PLAN:     1.  Dyspnea due to pneumonia a

## 2018-01-30 LAB
BASOPHILS # BLD AUTO: 0.03 X10(3) UL (ref 0–0.1)
BASOPHILS NFR BLD AUTO: 0.6 %
BUN BLD-MCNC: 23 MG/DL (ref 8–20)
CALCIUM BLD-MCNC: 9 MG/DL (ref 8.3–10.3)
CHLORIDE: 97 MMOL/L (ref 101–111)
CO2: 36 MMOL/L (ref 22–32)
CREAT BLD-MCNC: 1.14 MG/DL (ref 0.7–1.3)
EOSINOPHIL # BLD AUTO: 0.16 X10(3) UL (ref 0–0.3)
EOSINOPHIL NFR BLD AUTO: 3 %
ERYTHROCYTE [DISTWIDTH] IN BLOOD BY AUTOMATED COUNT: 15.4 % (ref 11.5–16)
GLUCOSE BLD-MCNC: 150 MG/DL (ref 65–99)
GLUCOSE BLD-MCNC: 152 MG/DL (ref 70–99)
GLUCOSE BLD-MCNC: 213 MG/DL (ref 65–99)
GLUCOSE BLD-MCNC: 229 MG/DL (ref 65–99)
GLUCOSE BLD-MCNC: 252 MG/DL (ref 65–99)
HCT VFR BLD AUTO: 29.2 % (ref 37–53)
HGB BLD-MCNC: 9 G/DL (ref 13–17)
IMMATURE GRANULOCYTE COUNT: 0.03 X10(3) UL (ref 0–1)
IMMATURE GRANULOCYTE RATIO %: 0.6 %
LYMPHOCYTES # BLD AUTO: 0.34 X10(3) UL (ref 0.9–4)
LYMPHOCYTES NFR BLD AUTO: 6.4 %
MCH RBC QN AUTO: 26.2 PG (ref 27–33.2)
MCHC RBC AUTO-ENTMCNC: 30.8 G/DL (ref 31–37)
MCV RBC AUTO: 84.9 FL (ref 80–99)
MONOCYTES # BLD AUTO: 0.42 X10(3) UL (ref 0.1–0.6)
MONOCYTES NFR BLD AUTO: 7.9 %
NEUTROPHIL ABS PRELIM: 4.32 X10 (3) UL (ref 1.3–6.7)
NEUTROPHILS # BLD AUTO: 4.32 X10(3) UL (ref 1.3–6.7)
NEUTROPHILS NFR BLD AUTO: 81.5 %
PLATELET # BLD AUTO: 293 10(3)UL (ref 150–450)
POTASSIUM SERPL-SCNC: 3.7 MMOL/L (ref 3.6–5.1)
RBC # BLD AUTO: 3.44 X10(6)UL (ref 3.8–5.8)
RED CELL DISTRIBUTION WIDTH-SD: 46.8 FL (ref 35.1–46.3)
SODIUM SERPL-SCNC: 139 MMOL/L (ref 136–144)
WBC # BLD AUTO: 5.3 X10(3) UL (ref 4–13)

## 2018-01-30 PROCEDURE — 99232 SBSQ HOSP IP/OBS MODERATE 35: CPT | Performed by: STUDENT IN AN ORGANIZED HEALTH CARE EDUCATION/TRAINING PROGRAM

## 2018-01-30 RX ORDER — POTASSIUM CHLORIDE 20 MEQ/1
40 TABLET, EXTENDED RELEASE ORAL ONCE
Status: COMPLETED | OUTPATIENT
Start: 2018-01-30 | End: 2018-01-30

## 2018-01-30 RX ORDER — FUROSEMIDE 10 MG/ML
40 INJECTION INTRAMUSCULAR; INTRAVENOUS
Status: DISCONTINUED | OUTPATIENT
Start: 2018-01-30 | End: 2018-02-02

## 2018-01-30 NOTE — PLAN OF CARE
A&Ox4. NSR on tele. Denies pain. Voiding per urinal. Up to chair, tolerating well. Encouraging use of incentive spirometry. Will continue to monitor.            CARDIOVASCULAR - ADULT    • Maintains optimal cardiac output and hemodynamic stability Progressi

## 2018-01-30 NOTE — PROGRESS NOTES
[facility]  Cardiology Progress Note    Subjective:  No chest pain or shortness of breath. Wife at bedside.       Objective:  BP 99/59 (BP Location: Right arm)   Pulse 65   Temp 97.6 °F (36.4 °C) (Oral)   Resp 17   Ht 6' 1\" (1.854 m)   Wt 191 lb 5.8 ARTHUR Healy  1/30/2018  1:51 PM      =======================================================  Patient seen and examined independently. Note reviewed and labs reviewed. Agree with above assessment and plan.   Breathing improved, net negative

## 2018-01-30 NOTE — PLAN OF CARE
Lungs sound diminished. 3L while awake but 6L during sleep. Unable to lay flat, prefers HOB 45 degree. STEVENSON with simple movement in bed. Significant desat when stands up into low 80s. Need to increase to 8L prior to movement.     Received lasix last nig

## 2018-01-30 NOTE — PROGRESS NOTES
[facility]  Progress Note     Shwetha Humphries Patient Status:  Inpatient    1940 MRN [id number]   [facility] 7NE-A Attending Alexei Haney MD   [facility] Day # 6 PCP Cuong Lomeli MD     Chief Complaint: SOB     S: Patient so Q12H   • aspirin  81 mg Oral Daily   • docusate sodium  100 mg Oral BID   • gabapentin  900 mg Oral BID   • enoxaparin  40 mg Subcutaneous Daily   • Insulin Aspart Pen  1-5 Units Subcutaneous TID CC and HS   • Fluticasone Furoate-Vilanterol  1 puff Inhalat

## 2018-01-30 NOTE — PROGRESS NOTES
[facility]  Progress Note    [facility] Patient Status:  Inpatient    1940 MRN [id number]   [facility] 7NE-A Attending Mariaelena Gill MD   [facility] Day # 6 PCP Shania Turcios MD        Assessment and Plan:  1.  [facility]pected v ([facility]     Malnutrition ([facility]     Hypoxemia     Syncope, near     Transient alteration of awareness     Chronic respiratory failure with hypoxia (HCC)     Hyponatremia     Urinary urgency     Need for vaccination     Laceration of left elbow     Chronic con 152*   BUN  21*   --   22*  23*   CREATSERUM  1.01   --   1.00  1.14   CA  8.9   --   8.9  9.0   NA  137   --   137  139   K  3.5*  4.4  3.8  3.7   CL  96*   --   96*  97*   CO2  37.0*   --   35.0*  36.0*     @MG@      Lab Results  Component Value Date   I 1-5 Units Subcutaneous TID CC and HS   Fluticasone Furoate-Vilanterol (BREO ELLIPTA) 200-25 MCG/INH inhaler 1 puff 1 puff Inhalation Daily       PEAK flow  PF Readings from Last 1 Encounters:  No data found for PF        Vicenta Angeles  1/30/2018  3:3

## 2018-01-30 NOTE — CM/SW NOTE
LOS #6 days~ IV ABT every 8 hours in progress , IV Lasix BID to treat dyspnea and fluid overload. When medically stable, to discharge home with [facility]. CM/SW to follow.

## 2018-01-31 ENCOUNTER — APPOINTMENT (OUTPATIENT)
Dept: CARDIAC REHAB | Facility: HOSPITAL | Age: 78
End: 2018-01-31
Attending: INTERNAL MEDICINE
Payer: MEDICARE

## 2018-01-31 ENCOUNTER — APPOINTMENT (OUTPATIENT)
Dept: GENERAL RADIOLOGY | Facility: HOSPITAL | Age: 78
DRG: 291 | End: 2018-01-31
Attending: INTERNAL MEDICINE
Payer: MEDICARE

## 2018-01-31 LAB
ANA SCREEN: POSITIVE
BUN BLD-MCNC: 24 MG/DL (ref 8–20)
CALCIUM BLD-MCNC: 9.4 MG/DL (ref 8.3–10.3)
CENTROMERE AUTOAB: <100 AU/ML (ref ?–100)
CHLORIDE: 98 MMOL/L (ref 101–111)
CO2: 36 MMOL/L (ref 22–32)
CREAT BLD-MCNC: 1.14 MG/DL (ref 0.7–1.3)
DSDNA AUTOAB: <100 IU/ML (ref ?–100)
EST. AVERAGE GLUCOSE BLD GHB EST-MCNC: 131 MG/DL (ref 68–126)
GLUCOSE BLD-MCNC: 121 MG/DL (ref 70–99)
GLUCOSE BLD-MCNC: 146 MG/DL (ref 65–99)
GLUCOSE BLD-MCNC: 147 MG/DL (ref 65–99)
GLUCOSE BLD-MCNC: 151 MG/DL (ref 65–99)
GLUCOSE BLD-MCNC: 195 MG/DL (ref 65–99)
HBA1C MFR BLD HPLC: 6.2 % (ref ?–5.7)
HISTONE AUTOAB: <100 AU/ML (ref ?–100)
JO-1 AUTOAB: <100 AU/ML (ref ?–100)
POTASSIUM SERPL-SCNC: 3.7 MMOL/L (ref 3.6–5.1)
POTASSIUM SERPL-SCNC: 3.7 MMOL/L (ref 3.6–5.1)
PROCALCITONIN SERPL-MCNC: <0.11 NG/ML (ref ?–0.11)
RNP AUTOAB: <100 AU/ML (ref ?–100)
SCL-70 AUTOAB: <100 AU/ML (ref ?–100)
SM AUTOAB (SMITH): <100 AU/ML (ref ?–100)
SODIUM SERPL-SCNC: 139 MMOL/L (ref 136–144)
SSA AUTOAB: 220 AU/ML (ref ?–100)
SSB AUTOAB: <100 AU/ML (ref ?–100)

## 2018-01-31 PROCEDURE — 71046 X-RAY EXAM CHEST 2 VIEWS: CPT | Performed by: INTERNAL MEDICINE

## 2018-01-31 PROCEDURE — 71045 X-RAY EXAM CHEST 1 VIEW: CPT | Performed by: INTERNAL MEDICINE

## 2018-01-31 PROCEDURE — 99232 SBSQ HOSP IP/OBS MODERATE 35: CPT | Performed by: HOSPITALIST

## 2018-01-31 RX ORDER — POTASSIUM CHLORIDE 20 MEQ/1
40 TABLET, EXTENDED RELEASE ORAL ONCE
Status: COMPLETED | OUTPATIENT
Start: 2018-01-31 | End: 2018-01-31

## 2018-01-31 RX ORDER — SPIRONOLACTONE 25 MG/1
25 TABLET ORAL
Status: DISCONTINUED | OUTPATIENT
Start: 2018-01-31 | End: 2018-02-02

## 2018-01-31 NOTE — PHYSICAL THERAPY NOTE
Attempted to see Pt this AM for PT session - Pt currently occupied with breakfast tray, and requesting time. Will f/u later today if time permits. RN aware of attempt.

## 2018-01-31 NOTE — PROGRESS NOTES
[facility]  Cardiology Progress Note    Subjective:  Overall feels \"better\". Continues to diurese with relatively stable renal function. Tells me he ambulated earlier and even did some stairs.   He worked hard on [place] over last remains stable but at upper limits of baseline creatinine with serologic evidence of contraction alkalosis. Continue IV Lasix 40mg BID through today and consider resuming oral torsemide tomorrow. Repeat BMP in a.m.   Overall continues to tune-up nicely in

## 2018-01-31 NOTE — PLAN OF CARE
Assumed patient care at 0730, Vital signs stable. Fluid restriction maintained. Up to chair and up with PT/OT. 5L nasal cannula. Lasix BID. CXR done- no significant change from previous study.    CARDIOVASCULAR - ADULT    • Maintains optimal cardiac output

## 2018-01-31 NOTE — PHYSICAL THERAPY NOTE
PHYSICAL THERAPY TREATMENT NOTE - INPATIENT    Room Number: 9398/4725-Y     Session: 1   Number of Visits to Meet Established Goals: 5    Presenting Problem: STEVENSON; Pneumonia    History related to current admission per Dr Truman Szymanski 01/24/18 Note:  77-year-o • Visual impairment        Past Surgical History  Past Surgical History:  06/16/2017: ANGIOGRAM      Comment: [facility]  1994: ARTHROSCOPY OF JOINT UNLISTED Left      Comment: knee  1980: CHOLECYSTECTOMY  2006: HERNIA SURGERY      Comment: ventral hernia repair Little   -   Need to walk in hospital room?: A Little   -   Climbing 3-5 steps with a railing?: A Little       AM-PAC Score:  Raw Score: 19   PT Approx Degree of Impairment Score: 41.77%   Standardized Score (AM-PAC Scale): 45.44   CMS Modifier (G-Code): C questions and concerns addressed    ASSESSMENT     Patient currently does not meet criteria for skilled inpatient physical therapy services, however patient will remain on Inpatient Mobility Team and will continue with ambulation to maintain current level

## 2018-01-31 NOTE — PLAN OF CARE
AOx4, VSS on 6L   NSR per tele  Denies pain/discomfort  Lungs diminished  Abdomen rounded  Encouraged incentive spirometer  Due medications given, needs attended, will continue to monitor.         RESPIRATORY - ADULT    • Achieves optimal ventilation and ox

## 2018-01-31 NOTE — PROGRESS NOTES
[facility]  Progress Note     Lorne Kulkarni Patient Status:  Inpatient    1940 MRN [id number]   [facility] 7NE-A Attending Ally Zuleta MD   [facility] Day # 7 PCP Holden Abbott MD     Chief Complaint: CHF    S: Patient is 900 mg Oral BID   • enoxaparin  40 mg Subcutaneous Daily   • Insulin Aspart Pen  1-5 Units Subcutaneous TID CC and HS   • Fluticasone Furoate-Vilanterol  1 puff Inhalation Daily       ASSESSMENT / PLAN:     1.  Acute on chronic hypoxic respiratory failure

## 2018-01-31 NOTE — PROGRESS NOTES
[facility]  Progress Note    Amie Perez Patient Status:  Inpatient    1940 MRN [id number]   [place] 7NE-A Attending Kyle Allison MD   [facility] Day # 7 PCP Iline Felty, MD        Assessment and Plan:  1.  Suspected congestive heart failure (HCC)     Heart failure with preserved ejection fraction, borderline, class IV (HCC)     Malnutrition (HCC)     Hypoxemia     Syncope, near     Transient alteration of awareness     Chronic respiratory failure with hypoxia ([facility] [address] 01/30/18  0549   WBC 5.1 5.3   HGB 8.8* 9.0*   .0 293.0       Recent Labs   Lab  01/28/18   1726  01/29/18   0511  01/30/18   0549  01/31/18   0511   GLU   --   143*  152*  121*   BUN   --   22*  23*  24*   CREATSERUM   --   1.00  1.14  1.14   CA 30 g 30 g Oral Q15 Min PRN   Or      Glucose-Vitamin C (DEX-4) 4-0.006 g chewable tab 8 tablet 8 tablet Oral Q15 Min PRN   Insulin Aspart Pen (NOVOLOG) 100 UNIT/ML flexpen 1-5 Units 1-5 Units Subcutaneous TID CC and HS   Fluticasone Furoate-Vilanterol (VALERY

## 2018-02-01 ENCOUNTER — APPOINTMENT (OUTPATIENT)
Dept: CT IMAGING | Facility: HOSPITAL | Age: 78
DRG: 291 | End: 2018-02-01
Attending: INTERNAL MEDICINE
Payer: MEDICARE

## 2018-02-01 LAB
BUN BLD-MCNC: 25 MG/DL (ref 8–20)
CALCIUM BLD-MCNC: 9.2 MG/DL (ref 8.3–10.3)
CHLORIDE: 97 MMOL/L (ref 101–111)
CO2: 35 MMOL/L (ref 22–32)
CREAT BLD-MCNC: 1.11 MG/DL (ref 0.7–1.3)
GLUCOSE BLD-MCNC: 127 MG/DL (ref 65–99)
GLUCOSE BLD-MCNC: 134 MG/DL (ref 65–99)
GLUCOSE BLD-MCNC: 134 MG/DL (ref 70–99)
GLUCOSE BLD-MCNC: 166 MG/DL (ref 65–99)
GLUCOSE BLD-MCNC: 234 MG/DL (ref 65–99)
POTASSIUM SERPL-SCNC: 3.8 MMOL/L (ref 3.6–5.1)
SODIUM SERPL-SCNC: 139 MMOL/L (ref 136–144)

## 2018-02-01 PROCEDURE — 71250 CT THORAX DX C-: CPT | Performed by: INTERNAL MEDICINE

## 2018-02-01 PROCEDURE — 99231 SBSQ HOSP IP/OBS SF/LOW 25: CPT | Performed by: HOSPITALIST

## 2018-02-01 NOTE — PROGRESS NOTES
[facility]  Progress Note     Paddy Louis Patient Status:  Inpatient    1940 MRN [id number]   [facility] 7NE-A Attending Brian Marcus MD   [facility] Day # 8 PCP Gerson Ledesma MD     Chief Complaint: CHF    S: Patient Gallito Bean ASSESSMENT / PLAN:     1. Acute on chronic hypoxic respiratory failure d/t acute heart failure d/t underlying constrictive pericarditis, possible underlying pneumonia - PCT negative  2. COPD without exacerbation  3. CAD  4. Essential hypertension  5.

## 2018-02-01 NOTE — PLAN OF CARE
Received patient today alert and oriented x3 and having no pain. Patient on 5L of oxygen sating well. He has been up to the chair for most of the day. He walked in the brandon three times. Patient updated on plan of care. Will continue to monitor closely.

## 2018-02-01 NOTE — PLAN OF CARE
Assumed care at [address] [facility]. Pt A/O x4. 5L of O2 per NC. NSR on tele. VSS. Plan for CT of the chest at 0730 this morning. Call light within reach. Will continue to monitor.     CARDIOVASCULAR - ADULT    • Maintains optimal cardiac output and hemodynamic stabili

## 2018-02-01 NOTE — PROGRESS NOTES
Continues to improve and overall feels much better no cough no chest pain [facility]  Progress Note    Zora Springer Patient Status:  Inpatient    1940 MRN [id number]   [facility] 7NE-A Attending Dalila Ahumada, MD   [facility] Day # reviewed     Assessment and Plan:   Patient Active Problem List:     Peripheral vascular disease, unspecified ([facility] [address]     Chronic obstructive pulmonary disease ([facility] [address]     Diabetes mellitus type 2, controlled, with complications ([facility] [address]     Essential hypertensio mellitus  9. Essential hypertension  10. Hyperlipidemia    Discussed at length worsening high-res CT may be more lag phenomenon as patient clinically continues to improve  Plan at this time to recheck chest x-ray in a.m.   Discussed at length with patient a

## 2018-02-01 NOTE — PROGRESS NOTES
No issues overnight. Feels okay.     Afebrile  110/64   69 regular    Lungs better aeration bilaterally  Ht RRR - no significant AI  abd soft - less distended  Ext no edema    Negative 9 liters since admission -- renal function tolerating   25/1.1    CT sca

## 2018-02-01 NOTE — PROGRESS NOTES
Patient presented sitting upright in bed side chair; VSS and agreeable to participate. Transferred sit>stand w/supervision assist.  Ambulated 300' w/RW and supervision assist.  Upon completion, patient made comfortable in [facility] w/ call light in reach.   CARISA Hutchison

## 2018-02-02 ENCOUNTER — APPOINTMENT (OUTPATIENT)
Dept: CARDIAC REHAB | Facility: HOSPITAL | Age: 78
End: 2018-02-02
Attending: INTERNAL MEDICINE
Payer: MEDICARE

## 2018-02-02 ENCOUNTER — APPOINTMENT (OUTPATIENT)
Dept: GENERAL RADIOLOGY | Facility: HOSPITAL | Age: 78
DRG: 291 | End: 2018-02-02
Attending: HOSPITALIST
Payer: MEDICARE

## 2018-02-02 VITALS
HEART RATE: 66 BPM | RESPIRATION RATE: 20 BRPM | WEIGHT: 188.06 LBS | BODY MASS INDEX: 24.92 KG/M2 | SYSTOLIC BLOOD PRESSURE: 120 MMHG | HEIGHT: 73 IN | OXYGEN SATURATION: 99 % | DIASTOLIC BLOOD PRESSURE: 64 MMHG | TEMPERATURE: 98 F

## 2018-02-02 LAB
ALBUMIN SERPL-MCNC: 2.5 G/DL (ref 3.5–4.8)
ALP LIVER SERPL-CCNC: 151 U/L (ref 45–117)
ALT SERPL-CCNC: 23 U/L (ref 17–63)
AST SERPL-CCNC: 14 U/L (ref 15–41)
BASOPHILS # BLD AUTO: 0.05 X10(3) UL (ref 0–0.1)
BASOPHILS NFR BLD AUTO: 1 %
BETA NATRIURETIC PEPTIDE: 143 PG/ML (ref 2–99)
BILIRUB SERPL-MCNC: 0.5 MG/DL (ref 0.1–2)
BUN BLD-MCNC: 22 MG/DL (ref 8–20)
CALCIUM BLD-MCNC: 9.4 MG/DL (ref 8.3–10.3)
CHLORIDE: 96 MMOL/L (ref 101–111)
CO2: 35 MMOL/L (ref 22–32)
CREAT BLD-MCNC: 0.99 MG/DL (ref 0.7–1.3)
EOSINOPHIL # BLD AUTO: 0.16 X10(3) UL (ref 0–0.3)
EOSINOPHIL NFR BLD AUTO: 3.2 %
ERYTHROCYTE [DISTWIDTH] IN BLOOD BY AUTOMATED COUNT: 15.6 % (ref 11.5–16)
GLUCOSE BLD-MCNC: 119 MG/DL (ref 70–99)
GLUCOSE BLD-MCNC: 126 MG/DL (ref 65–99)
GLUCOSE BLD-MCNC: 128 MG/DL (ref 65–99)
HCT VFR BLD AUTO: 33.4 % (ref 37–53)
HGB BLD-MCNC: 10.2 G/DL (ref 13–17)
IMMATURE GRANULOCYTE COUNT: 0.04 X10(3) UL (ref 0–1)
IMMATURE GRANULOCYTE RATIO %: 0.8 %
LYMPHOCYTES # BLD AUTO: 0.5 X10(3) UL (ref 0.9–4)
LYMPHOCYTES NFR BLD AUTO: 9.9 %
M PROTEIN MFR SERPL ELPH: 7.9 G/DL (ref 6.1–8.3)
MCH RBC QN AUTO: 25.8 PG (ref 27–33.2)
MCHC RBC AUTO-ENTMCNC: 30.5 G/DL (ref 31–37)
MCV RBC AUTO: 84.3 FL (ref 80–99)
MONOCYTES # BLD AUTO: 0.29 X10(3) UL (ref 0.1–0.6)
MONOCYTES NFR BLD AUTO: 5.7 %
NEUTROPHIL ABS PRELIM: 4.02 X10 (3) UL (ref 1.3–6.7)
NEUTROPHILS # BLD AUTO: 4.02 X10(3) UL (ref 1.3–6.7)
NEUTROPHILS NFR BLD AUTO: 79.4 %
PLATELET # BLD AUTO: 365 10(3)UL (ref 150–450)
POTASSIUM SERPL-SCNC: 4 MMOL/L (ref 3.6–5.1)
RBC # BLD AUTO: 3.96 X10(6)UL (ref 3.8–5.8)
RED CELL DISTRIBUTION WIDTH-SD: 47.8 FL (ref 35.1–46.3)
SODIUM SERPL-SCNC: 135 MMOL/L (ref 136–144)
WBC # BLD AUTO: 5.1 X10(3) UL (ref 4–13)

## 2018-02-02 PROCEDURE — 71046 X-RAY EXAM CHEST 2 VIEWS: CPT | Performed by: HOSPITALIST

## 2018-02-02 PROCEDURE — 99239 HOSP IP/OBS DSCHRG MGMT >30: CPT | Performed by: HOSPITALIST

## 2018-02-02 RX ORDER — IPRATROPIUM BROMIDE AND ALBUTEROL SULFATE 2.5; .5 MG/3ML; MG/3ML
3 SOLUTION RESPIRATORY (INHALATION)
Qty: 240 VIAL | Refills: 0 | Status: SHIPPED | OUTPATIENT
Start: 2018-02-02

## 2018-02-02 RX ORDER — SPIRONOLACTONE 25 MG/1
25 TABLET ORAL
Qty: 60 TABLET | Refills: 3 | Status: ON HOLD | OUTPATIENT
Start: 2018-02-02 | End: 2018-02-20

## 2018-02-02 NOTE — PROGRESS NOTES
[facility]  Progress Note    Mich Werner Patient Status:  Inpatient    1940 MRN [id number]   [facility] 7NE-A Attending Marquez Caruso MD   [facility] Day # 9 PCP Javi Schaeffer MD     Subjective:  Mich Werner is a(n) 69 non-allergic rhinitis     Pleural effusion     Restrictive pericarditis     Aortic insufficiency     Diabetic polyneuropathy associated with type 2 diabetes mellitus (HCC)     Sensory hearing loss, bilateral     Mild pulmonary hypertension     Dyspnea

## 2018-02-02 NOTE — CM/SW NOTE
Pt will be d/c'd home today [facility] will see pt once before he is readmitted for surgery on 2/6. Guillermo Thakkar from [facility] aware.

## 2018-02-02 NOTE — PLAN OF CARE
NURSING DISCHARGE NOTE    Discharged Home via Wheelchair. Accompanied by Spouse  Belongings Taken by patient/family.     Discharge instructions given to patient and spouse   Educated on medications, s/s to monitor for, IS education, follow up appointme

## 2018-02-02 NOTE — PROGRESS NOTES
[facility]  Progress Note     Terrance Kbmeghann Patient Status:  Inpatient    1940 MRN [id number]   [facility] 7NE-A Attending Fazal Wren MD   [facility] Day # 9 PCP Marshal Ordoñez MD     Chief Complaint: CHF    S: Patient samia Insulin Aspart Pen  1-5 Units Subcutaneous TID CC and HS   • Fluticasone Furoate-Vilanterol  1 puff Inhalation Daily       ASSESSMENT / PLAN:     1.  Acute on chronic hypoxic respiratory failure d/t acute heart failure d/t underlying constrictive pericardit

## 2018-02-02 NOTE — CM/SW NOTE
02/02/18 1500   Discharge disposition   Discharged to: Home-Health   Name of [facility] 1 services after discharge Skilled home care   Discharge transportation Private car

## 2018-02-02 NOTE — DISCHARGE SUMMARY
[facility]  DISCHARGE SUMMARY     Shwetha Humphries Patient Status:  Inpatient    1940 MRN [id number]   [facility] 7NE-A Attending Alexia Curling, MD   [facility] Day # 9 PCP Cuong Lomeli MD     Date of Admission: 2018  Mitchel on diuretic drip and empiric abx. Patient with clinical improvement. His oxygen requirements have improved. He is down to 5L from 8L. Plan for home today on diuretics, continue aggressive incentive spirometry.  Patient to return on 2/6/2018 for pericardial glimepiride 2 MG Tabs  Commonly known as:  AMARYL      Take 1 tablet (2 mg total) by mouth 2 (two) times daily.    Quantity:  180 tablet  Refills:  0     SPIRIVA RESPIMAT 2.5 MCG/ACT Aers  Generic drug:  Tiotropium Bromide Monohydrate      Inhale 2.5 mcg

## 2018-02-02 NOTE — PLAN OF CARE
Assumed care at [address]. Pt A/O x4. 5L of O2 per NC. NSR/SB on tele. VSS. NPO after midnight. Plan for repeat CXR this AM. Call light within reach. Will continue to monitor.       CARDIOVASCULAR - ADULT    • Maintains optimal cardiac output and hemodynamic s

## 2018-02-02 NOTE — PROGRESS NOTES
Seen and examined. Feels okay. Still limited yet clearly better than upon admission.     Afebrile  99/54  60 regular    Lungs decreased BS bases but overall better air movement  Ht RRR  abd soft - less distention  Ext no edema    >10.5 liters negative since

## 2018-02-03 ENCOUNTER — LAB ENCOUNTER (OUTPATIENT)
Dept: LAB | Facility: HOSPITAL | Age: 78
End: 2018-02-03
Attending: INTERNAL MEDICINE
Payer: MEDICARE

## 2018-02-03 DIAGNOSIS — Z79.899 HIGH RISK MEDICATIONS (NOT ANTICOAGULANTS) LONG-TERM USE: ICD-10-CM

## 2018-02-03 DIAGNOSIS — Z01.818 PREOPERATIVE EVALUATION OF A MEDICAL CONDITION TO RULE OUT SURGICAL CONTRAINDICATIONS (TAR REQUIRED): ICD-10-CM

## 2018-02-03 DIAGNOSIS — Z91.89 AT RISK FOR BLEEDING: ICD-10-CM

## 2018-02-03 DIAGNOSIS — Z79.899 ENCOUNTER FOR LONG-TERM (CURRENT) USE OF HIGH-RISK MEDICATION: ICD-10-CM

## 2018-02-03 DIAGNOSIS — I31.1 CHRONIC CONSTRICTIVE PERICARDITIS, UNSPECIFIED TYPE: ICD-10-CM

## 2018-02-03 DIAGNOSIS — Z01.818 PRE-OP TESTING: ICD-10-CM

## 2018-02-03 DIAGNOSIS — R09.1 PLEURITIS: ICD-10-CM

## 2018-02-03 LAB
ALBUMIN SERPL-MCNC: 2.8 G/DL (ref 3.5–4.8)
ALP LIVER SERPL-CCNC: 164 U/L (ref 45–117)
ALT SERPL-CCNC: 25 U/L (ref 17–63)
ANTIBODY SCREEN: NEGATIVE
APTT PPP: 42 SECONDS (ref 25–34)
AST SERPL-CCNC: 18 U/L (ref 15–41)
BASOPHILS # BLD AUTO: 0.04 X10(3) UL (ref 0–0.1)
BASOPHILS NFR BLD AUTO: 0.6 %
BILIRUB SERPL-MCNC: 0.5 MG/DL (ref 0.1–2)
BILIRUB UR QL STRIP.AUTO: NEGATIVE
BUN BLD-MCNC: 20 MG/DL (ref 8–20)
C-REACTIVE PROTEIN: 7.02 MG/DL (ref ?–1)
CALCIUM BLD-MCNC: 9.5 MG/DL (ref 8.3–10.3)
CHLORIDE: 96 MMOL/L (ref 101–111)
CLARITY UR REFRACT.AUTO: CLEAR
CO2: 35 MMOL/L (ref 22–32)
COLOR UR AUTO: YELLOW
COMPLEMENT C3: 164 MG/DL (ref 90–180)
COMPLEMENT C4: 30.4 MG/DL (ref 10–40)
CREAT BLD-MCNC: 0.98 MG/DL (ref 0.7–1.3)
DEPRECATED HBV CORE AB SER IA-ACNC: 207.5 NG/ML (ref 22–322)
EOSINOPHIL # BLD AUTO: 0.08 X10(3) UL (ref 0–0.3)
EOSINOPHIL NFR BLD AUTO: 1.1 %
ERYTHROCYTE [DISTWIDTH] IN BLOOD BY AUTOMATED COUNT: 15.5 % (ref 11.5–16)
EST. AVERAGE GLUCOSE BLD GHB EST-MCNC: 134 MG/DL (ref 68–126)
GLUCOSE BLD-MCNC: 120 MG/DL (ref 70–99)
GLUCOSE UR STRIP.AUTO-MCNC: NEGATIVE MG/DL
HBA1C MFR BLD HPLC: 6.3 % (ref ?–5.7)
HBV CORE AB SERPL QL IA: NONREACTIVE
HBV SURFACE AB SER QL: NONREACTIVE
HBV SURFACE AB SERPL IA-ACNC: <3.1 MIU/ML
HBV SURFACE AG SERPL QL IA: NONREACTIVE
HCT VFR BLD AUTO: 36.1 % (ref 37–53)
HEPATITIS B SURFACE ANTIGEN INDEX: <0.1
HEPATITIS C VIRUS AB INTERPRETATION: NONREACTIVE
HGB BLD-MCNC: 11 G/DL (ref 13–17)
IMMATURE GRANULOCYTE COUNT: 0.05 X10(3) UL (ref 0–1)
IMMATURE GRANULOCYTE RATIO %: 0.7 %
INR BLD: 1.27 (ref 0.89–1.11)
IRON SATURATION: 9 % (ref 13–45)
IRON: 36 UG/DL (ref 45–182)
KETONES UR STRIP.AUTO-MCNC: NEGATIVE MG/DL
LEUKOCYTE ESTERASE UR QL STRIP.AUTO: NEGATIVE
LYMPHOCYTES # BLD AUTO: 0.37 X10(3) UL (ref 0.9–4)
LYMPHOCYTES NFR BLD AUTO: 5.1 %
M PROTEIN MFR SERPL ELPH: 8.7 G/DL (ref 6.1–8.3)
MCH RBC QN AUTO: 26.1 PG (ref 27–33.2)
MCHC RBC AUTO-ENTMCNC: 30.5 G/DL (ref 31–37)
MCV RBC AUTO: 85.7 FL (ref 80–99)
MONOCYTES # BLD AUTO: 0.21 X10(3) UL (ref 0.1–0.6)
MONOCYTES NFR BLD AUTO: 2.9 %
NEUTROPHIL ABS PRELIM: 6.51 X10 (3) UL (ref 1.3–6.7)
NEUTROPHILS # BLD AUTO: 6.51 X10(3) UL (ref 1.3–6.7)
NEUTROPHILS NFR BLD AUTO: 89.6 %
NITRITE UR QL STRIP.AUTO: NEGATIVE
PH UR STRIP.AUTO: 6 [PH] (ref 4.5–8)
PLATELET # BLD AUTO: 462 10(3)UL (ref 150–450)
POTASSIUM SERPL-SCNC: 3.9 MMOL/L (ref 3.6–5.1)
PROT UR STRIP.AUTO-MCNC: NEGATIVE MG/DL
PSA SERPL DL<=0.01 NG/ML-MCNC: 16 SECONDS (ref 12–14.3)
RBC # BLD AUTO: 4.21 X10(6)UL (ref 3.8–5.8)
RBC UR QL AUTO: NEGATIVE
RED CELL DISTRIBUTION WIDTH-SD: 48 FL (ref 35.1–46.3)
RH BLOOD TYPE: POSITIVE
SED RATE-ML: 85 MM/HR (ref 0–12)
SODIUM SERPL-SCNC: 136 MMOL/L (ref 136–144)
SP GR UR STRIP.AUTO: 1.01 (ref 1–1.03)
TOTAL IRON BINDING CAPACITY: 393 UG/DL (ref 298–536)
TRANSFERRIN: 264 MG/DL (ref 200–360)
UROBILINOGEN UR STRIP.AUTO-MCNC: <2 MG/DL
WBC # BLD AUTO: 7.3 X10(3) UL (ref 4–13)

## 2018-02-03 PROCEDURE — 86160 COMPLEMENT ANTIGEN: CPT

## 2018-02-03 PROCEDURE — 86200 CCP ANTIBODY: CPT

## 2018-02-03 PROCEDURE — 86038 ANTINUCLEAR ANTIBODIES: CPT

## 2018-02-03 PROCEDURE — 87340 HEPATITIS B SURFACE AG IA: CPT

## 2018-02-03 PROCEDURE — 83883 ASSAY NEPHELOMETRY NOT SPEC: CPT

## 2018-02-03 PROCEDURE — 85652 RBC SED RATE AUTOMATED: CPT

## 2018-02-03 PROCEDURE — 83550 IRON BINDING TEST: CPT

## 2018-02-03 PROCEDURE — 86480 TB TEST CELL IMMUN MEASURE: CPT

## 2018-02-03 PROCEDURE — 83540 ASSAY OF IRON: CPT

## 2018-02-03 PROCEDURE — 86334 IMMUNOFIX E-PHORESIS SERUM: CPT

## 2018-02-03 PROCEDURE — 86140 C-REACTIVE PROTEIN: CPT

## 2018-02-03 PROCEDURE — 84165 PROTEIN E-PHORESIS SERUM: CPT

## 2018-02-03 PROCEDURE — 85610 PROTHROMBIN TIME: CPT

## 2018-02-03 PROCEDURE — 83516 IMMUNOASSAY NONANTIBODY: CPT

## 2018-02-03 PROCEDURE — 86900 BLOOD TYPING SEROLOGIC ABO: CPT

## 2018-02-03 PROCEDURE — 86901 BLOOD TYPING SEROLOGIC RH(D): CPT

## 2018-02-03 PROCEDURE — 86706 HEP B SURFACE ANTIBODY: CPT

## 2018-02-03 PROCEDURE — 85025 COMPLETE CBC W/AUTO DIFF WBC: CPT

## 2018-02-03 PROCEDURE — 80053 COMPREHEN METABOLIC PANEL: CPT

## 2018-02-03 PROCEDURE — 82728 ASSAY OF FERRITIN: CPT

## 2018-02-03 PROCEDURE — 86235 NUCLEAR ANTIGEN ANTIBODY: CPT

## 2018-02-03 PROCEDURE — 86225 DNA ANTIBODY NATIVE: CPT

## 2018-02-03 PROCEDURE — 85730 THROMBOPLASTIN TIME PARTIAL: CPT

## 2018-02-03 PROCEDURE — 86704 HEP B CORE ANTIBODY TOTAL: CPT

## 2018-02-03 PROCEDURE — 86803 HEPATITIS C AB TEST: CPT

## 2018-02-03 PROCEDURE — 86850 RBC ANTIBODY SCREEN: CPT

## 2018-02-03 PROCEDURE — 82787 IGG 1 2 3 OR 4 EACH: CPT

## 2018-02-03 PROCEDURE — 81003 URINALYSIS AUTO W/O SCOPE: CPT

## 2018-02-03 PROCEDURE — 36415 COLL VENOUS BLD VENIPUNCTURE: CPT

## 2018-02-03 PROCEDURE — 83036 HEMOGLOBIN GLYCOSYLATED A1C: CPT

## 2018-02-04 LAB
CYCLIC CITRULLINATED PEPTIDE: 6 UNITS
IMMUNOGLOBULIN G SUBCLASS 1: 352 MG/DL
IMMUNOGLOBULIN G SUBCLASS 2: 491 MG/DL
IMMUNOGLOBULIN G SUBCLASS 3: 269 MG/DL
IMMUNOGLOBULIN G SUBCLASS 4: 100 MG/DL

## 2018-02-05 ENCOUNTER — TELEPHONE (OUTPATIENT)
Dept: CASE MANAGEMENT | Age: 78
End: 2018-02-05

## 2018-02-05 ENCOUNTER — PATIENT OUTREACH (OUTPATIENT)
Dept: CASE MANAGEMENT | Age: 78
End: 2018-02-05

## 2018-02-05 ENCOUNTER — APPOINTMENT (OUTPATIENT)
Dept: CARDIAC REHAB | Facility: HOSPITAL | Age: 78
End: 2018-02-05
Attending: INTERNAL MEDICINE
Payer: MEDICARE

## 2018-02-05 ENCOUNTER — TELEPHONE (OUTPATIENT)
Dept: FAMILY MEDICINE CLINIC | Facility: CLINIC | Age: 78
End: 2018-02-05

## 2018-02-05 DIAGNOSIS — Z02.9 ENCOUNTERS FOR ADMINISTRATIVE PURPOSE: ICD-10-CM

## 2018-02-05 LAB — CHROMATIN ANTIBODY,IGG: 35 UNITS

## 2018-02-05 NOTE — TELEPHONE ENCOUNTER
Contacted the pt for TCM. Pt currently does not have his HFU appointment scheduled. Offered an HFU appt multiple times however the pt declined stating he is SOB still with activity and needs time to improve.  TCM/HFU is recommended by 2/9/18 as pt is a high

## 2018-02-05 NOTE — PROGRESS NOTES
Initial Post Discharge Follow Up   Discharge Date: 2/2/18  Contact Date: 2/5/2018    Consent Verification:  Assessment Completed With: Patient  Spouse: Fay Stack received per patient?  written and verbal per the pt   HIPAA Verified?   Yes    Discha 60 tablet Rfl: 3   docusate sodium 100 MG Oral Cap Take 100 mg by mouth every morning. Disp:  Rfl:    acetaminophen 500 MG Oral Tab Take 1,000 mg by mouth every 6 (six) hours as needed for Pain.  Disp:  Rfl:    Albuterol Sulfate (PROAIR RESPICLICK) 132 (90 RN- initial visit was today. RN/PT services are to start. DME ordered at D/C? Yes   What? Incentive spirometry   Have you received your (DME)? yes     Services ordered at D/C? Yes   What services:   PT   Have you scheduled these services?     no- serv your PCP?  (DME, meds, disease concerns, Etc): Yes- Pt is not sure why the surgery with Dr. Shagufta Soliman was cancelled tomorrow and pt is not sure if he needs to restart ASA 81 mg. NCM called Dr. Kizzy Smith office to have them call pt to advise.       Follow up appointme questions or concerns. Pt and spouse was encouraged multiples times to scheduled HFU however each time was declined. Benefits of HFU appointment were discussed.      Contacted Dr. Chase Sharma office, spoke to Irma, informed her the pt and spouse have concerns re

## 2018-02-05 NOTE — TELEPHONE ENCOUNTER
FYI to Dr. Corrine Dc from [place] [address] is calling to inform there was a delay in start of care. Residential will be seeing the patient today.

## 2018-02-06 LAB
ANA SCREEN: POSITIVE
CENTROMERE AUTOAB: <100 AU/ML (ref ?–100)
DSDNA AUTOAB: <100 IU/ML (ref ?–100)
HISTONE AUTOAB: <100 AU/ML (ref ?–100)
JO-1 AUTOAB: <100 AU/ML (ref ?–100)
RNP AUTOAB: 107 AU/ML (ref ?–100)
SCL-70 AUTOAB: <100 AU/ML (ref ?–100)
SM AUTOAB (SMITH): <100 AU/ML (ref ?–100)
SSA AUTOAB: 226 AU/ML (ref ?–100)
SSB AUTOAB: <100 AU/ML (ref ?–100)

## 2018-02-07 ENCOUNTER — APPOINTMENT (OUTPATIENT)
Dept: CARDIAC REHAB | Facility: HOSPITAL | Age: 78
End: 2018-02-07
Attending: INTERNAL MEDICINE
Payer: MEDICARE

## 2018-02-07 ENCOUNTER — HOSPITAL ENCOUNTER (OUTPATIENT)
Dept: GENERAL RADIOLOGY | Facility: HOSPITAL | Age: 78
Discharge: HOME | End: 2018-02-07
Attending: THORACIC SURGERY (CARDIOTHORACIC VASCULAR SURGERY)
Payer: MEDICARE

## 2018-02-07 DIAGNOSIS — J91.8 PLEURAL EFFUSION IN OTHER CONDITIONS CLASSIFIED ELSEWHERE: ICD-10-CM

## 2018-02-07 DIAGNOSIS — Z01.818 PRE-OP TESTING: ICD-10-CM

## 2018-02-07 DIAGNOSIS — R91.8 PULMONARY INFILTRATES: ICD-10-CM

## 2018-02-07 LAB
M TB TUBERC IFN-G BLD QL: NEGATIVE
M TB TUBERC IFN-G/MITOGEN IGNF BLD: 0 IU/ML
M TB TUBERC IGNF/MITOGEN IGNF CONTROL: >10 IU/ML
MITOGEN IGNF BCKGRD COR BLD-ACNC: 0.02 IU/ML

## 2018-02-07 PROCEDURE — 71046 X-RAY EXAM CHEST 2 VIEWS: CPT | Performed by: THORACIC SURGERY (CARDIOTHORACIC VASCULAR SURGERY)

## 2018-02-08 ENCOUNTER — OFFICE VISIT (OUTPATIENT)
Dept: FAMILY MEDICINE CLINIC | Facility: CLINIC | Age: 78
End: 2018-02-08

## 2018-02-08 VITALS
OXYGEN SATURATION: 93 % | HEIGHT: 73 IN | RESPIRATION RATE: 24 BRPM | TEMPERATURE: 98 F | HEART RATE: 72 BPM | SYSTOLIC BLOOD PRESSURE: 106 MMHG | BODY MASS INDEX: 24.92 KG/M2 | WEIGHT: 188 LBS | DIASTOLIC BLOOD PRESSURE: 58 MMHG

## 2018-02-08 DIAGNOSIS — G47.33 OSA ON CPAP: ICD-10-CM

## 2018-02-08 DIAGNOSIS — I50.30: Primary | Chronic | ICD-10-CM

## 2018-02-08 DIAGNOSIS — I27.20 MILD PULMONARY HYPERTENSION (HCC): ICD-10-CM

## 2018-02-08 DIAGNOSIS — Z99.89 OSA ON CPAP: ICD-10-CM

## 2018-02-08 DIAGNOSIS — E11.42 DIABETIC POLYNEUROPATHY ASSOCIATED WITH TYPE 2 DIABETES MELLITUS (HCC): ICD-10-CM

## 2018-02-08 DIAGNOSIS — J43.8 OTHER EMPHYSEMA (HCC): ICD-10-CM

## 2018-02-08 DIAGNOSIS — E78.00 PURE HYPERCHOLESTEROLEMIA: ICD-10-CM

## 2018-02-08 DIAGNOSIS — Z95.2 S/P AVR (AORTIC VALVE REPLACEMENT): ICD-10-CM

## 2018-02-08 DIAGNOSIS — I10 ESSENTIAL HYPERTENSION: ICD-10-CM

## 2018-02-08 DIAGNOSIS — I31.1 CHRONIC CONSTRICTIVE IDIOPATHIC PERICARDITIS: ICD-10-CM

## 2018-02-08 LAB
A/G RATIO: 0.89
ALBUMIN, SERUM: 3.73 G/DL (ref 3.5–4.8)
ALPHA-1 GLOBULIN: 0.44 G/DL (ref 0.1–0.3)
ALPHA-2 GLOBULIN: 1.11 G/DL (ref 0.6–1)
BETA GLOBULIN: 0.95 G/DL (ref 0.7–1.2)
GAMMA GLOBULIN: 1.67 G/DL (ref 0.6–1.6)
KAPPA FREE LIGHT CHAIN: 6.22 MG/DL (ref 0.33–1.94)
KAPPA/LAMBDA FLC RATIO: 1.49 (ref 0.26–1.65)
LAMBDA FREE LIGHT CHAIN: 4.16 MG/DL (ref 0.57–2.63)
TOTAL PROTEIN,SERUM: 7.9 G/DL (ref 6.1–8.3)

## 2018-02-08 PROCEDURE — 99495 TRANSJ CARE MGMT MOD F2F 14D: CPT | Performed by: FAMILY MEDICINE

## 2018-02-09 ENCOUNTER — APPOINTMENT (OUTPATIENT)
Dept: CARDIAC REHAB | Facility: HOSPITAL | Age: 78
End: 2018-02-09
Attending: INTERNAL MEDICINE
Payer: MEDICARE

## 2018-02-11 ENCOUNTER — ANESTHESIA EVENT (OUTPATIENT)
Dept: CARDIAC SURGERY | Facility: HOSPITAL | Age: 78
DRG: 270 | End: 2018-02-11
Payer: MEDICARE

## 2018-02-13 ENCOUNTER — ANESTHESIA (OUTPATIENT)
Dept: CARDIAC SURGERY | Facility: HOSPITAL | Age: 78
DRG: 270 | End: 2018-02-13
Payer: MEDICARE

## 2018-02-13 ENCOUNTER — HOSPITAL ENCOUNTER (INPATIENT)
Facility: HOSPITAL | Age: 78
LOS: 7 days | Discharge: NURSING, REHAB OR LONG-TERM CARE | DRG: 270 | End: 2018-02-20
Attending: THORACIC SURGERY (CARDIOTHORACIC VASCULAR SURGERY) | Admitting: THORACIC SURGERY (CARDIOTHORACIC VASCULAR SURGERY)
Payer: MEDICARE

## 2018-02-13 ENCOUNTER — APPOINTMENT (OUTPATIENT)
Dept: GENERAL RADIOLOGY | Facility: HOSPITAL | Age: 78
DRG: 270 | End: 2018-02-13
Attending: THORACIC SURGERY (CARDIOTHORACIC VASCULAR SURGERY)
Payer: MEDICARE

## 2018-02-13 ENCOUNTER — SURGERY (OUTPATIENT)
Age: 78
End: 2018-02-13

## 2018-02-13 DIAGNOSIS — I31.8 RESTRICTIVE PERICARDITIS: Primary | ICD-10-CM

## 2018-02-13 PROBLEM — I31.1 CONSTRICTIVE PERICARDITIS: Status: ACTIVE | Noted: 2018-02-13

## 2018-02-13 LAB
APTT PPP: 37.1 SECONDS (ref 25–34)
APTT PPP: 38.2 SECONDS (ref 25–34)
APTT PPP: 41.6 SECONDS (ref 25–34)
ARTERIAL BLD GAS O2 SATURATION: 96 % (ref 92–100)
ARTERIAL BLD GAS O2 SATURATION: 97 % (ref 92–100)
ARTERIAL BLOOD GAS BASE EXCESS: 3
ARTERIAL BLOOD GAS BASE EXCESS: 3.9
ARTERIAL BLOOD GAS HCO3: 30 MEQ/L (ref 22–26)
ARTERIAL BLOOD GAS HCO3: 30.7 MEQ/L (ref 22–26)
ARTERIAL BLOOD GAS PCO2: 58 MM HG (ref 35–45)
ARTERIAL BLOOD GAS PCO2: 60 MM HG (ref 35–45)
ARTERIAL BLOOD GAS PH: 7.32 (ref 7.35–7.45)
ARTERIAL BLOOD GAS PH: 7.34 (ref 7.35–7.45)
ARTERIAL BLOOD GAS PO2: 125 MM HG (ref 80–105)
ARTERIAL BLOOD GAS PO2: 211 MM HG (ref 80–105)
ATRIAL RATE: 65 BPM
BUN BLD-MCNC: 20 MG/DL (ref 8–20)
CALCIUM BLD-MCNC: 8.4 MG/DL (ref 8.3–10.3)
CALCULATED O2 SATURATION: 100 % (ref 92–100)
CALCULATED O2 SATURATION: 99 % (ref 92–100)
CARBOXYHEMOGLOBIN: 1.3 % SAT (ref 0–3)
CARBOXYHEMOGLOBIN: 1.5 % SAT (ref 0–3)
CHLORIDE: 101 MMOL/L (ref 101–111)
CHOLEST SMN-MCNC: 191 MG/DL (ref ?–200)
CO2: 32 MMOL/L (ref 22–32)
CREAT BLD-MCNC: 0.9 MG/DL (ref 0.7–1.3)
ERYTHROCYTE [DISTWIDTH] IN BLOOD BY AUTOMATED COUNT: 15.4 % (ref 11.5–16)
FIBRINOGEN: 489 MG/DL (ref 200–446)
FIBRINOGEN: 556 MG/DL (ref 200–446)
FIO2: 44 %
FIO2: 45 %
GLUCOSE BLD-MCNC: 104 MG/DL (ref 65–99)
GLUCOSE BLD-MCNC: 123 MG/DL (ref 65–99)
GLUCOSE BLD-MCNC: 130 MG/DL (ref 65–99)
GLUCOSE BLD-MCNC: 135 MG/DL (ref 65–99)
GLUCOSE BLD-MCNC: 144 MG/DL (ref 65–99)
GLUCOSE BLD-MCNC: 144 MG/DL (ref 65–99)
GLUCOSE BLD-MCNC: 149 MG/DL (ref 65–99)
GLUCOSE BLD-MCNC: 150 MG/DL (ref 65–99)
GLUCOSE BLD-MCNC: 154 MG/DL (ref 65–99)
GLUCOSE BLD-MCNC: 163 MG/DL (ref 65–99)
GLUCOSE BLD-MCNC: 167 MG/DL (ref 65–99)
GLUCOSE BLD-MCNC: 167 MG/DL (ref 65–99)
GLUCOSE BLD-MCNC: 171 MG/DL (ref 65–99)
GLUCOSE BLD-MCNC: 171 MG/DL (ref 65–99)
GLUCOSE BLD-MCNC: 183 MG/DL (ref 70–99)
HAV IGM SER QL: 2.2 MG/DL (ref 1.7–3)
HCT VFR BLD AUTO: 33.2 % (ref 37–53)
HDLC SERPL-MCNC: 38 MG/DL (ref 45–?)
HDLC SERPL: 5.03 {RATIO} (ref ?–4.97)
HGB BLD-MCNC: 10.3 G/DL (ref 13–17)
INR BLD: 1.19 (ref 0.89–1.11)
INR BLD: 1.57 (ref 0.89–1.11)
INR BLD: 1.83 (ref 0.89–1.11)
ISTAT ACTIVATED CLOTTING TIME: 136 SECONDS (ref 74–137)
ISTAT BLOOD GAS BASE EXCESS: 5 MMOL/L
ISTAT BLOOD GAS HCO3: 31.3 MEQ/L (ref 22–26)
ISTAT BLOOD GAS O2 SATURATION: 99 % (ref 92–100)
ISTAT BLOOD GAS PCO2: 60 MMHG (ref 35–45)
ISTAT BLOOD GAS PH: 7.33 (ref 7.35–7.45)
ISTAT BLOOD GAS PO2: 159 MMHG (ref 80–105)
ISTAT BLOOD GAS TCO2: 33 MMOL/L (ref 22–32)
ISTAT HEMATOCRIT: 29 % (ref 37–53)
ISTAT IONIZED CALCIUM: 1.13 MMOL/L (ref 1.12–1.32)
ISTAT POTASSIUM: 3.7 MMOL/L (ref 3.6–5.1)
ISTAT SODIUM: 141 MMOL/L (ref 136–144)
L/M: 6 L/MIN
LDLC SERPL CALC-MCNC: 123 MG/DL (ref ?–130)
MCH RBC QN AUTO: 26.3 PG (ref 27–33.2)
MCHC RBC AUTO-ENTMCNC: 31 G/DL (ref 31–37)
MCV RBC AUTO: 84.7 FL (ref 80–99)
METHEMOGLOBIN: 0.5 % SAT (ref 0.4–1.5)
METHEMOGLOBIN: 0.6 % SAT (ref 0.4–1.5)
NONHDLC SERPL-MCNC: 153 MG/DL (ref ?–130)
P AXIS: 50 DEGREES
P-R INTERVAL: 210 MS
PATIENT TEMPERATURE: 97.9 F
PATIENT TEMPERATURE: 98.1 F
PEEP: 5 CM H2O
PLATELET # BLD AUTO: 118 10(3)UL (ref 150–450)
PLATELET # BLD AUTO: 143 10(3)UL (ref 150–450)
POTASSIUM SERPL-SCNC: 4 MMOL/L (ref 3.6–5.1)
PRESSURE SUPPORT: 5 CM H2O
PSA SERPL DL<=0.01 NG/ML-MCNC: 15.2 SECONDS (ref 12–14.3)
PSA SERPL DL<=0.01 NG/ML-MCNC: 18.9 SECONDS (ref 12–14.3)
PSA SERPL DL<=0.01 NG/ML-MCNC: 21.4 SECONDS (ref 12–14.3)
Q-T INTERVAL: 492 MS
QRS DURATION: 146 MS
QTC CALCULATION (BEZET): 511 MS
R AXIS: -20 DEGREES
RBC # BLD AUTO: 3.92 X10(6)UL (ref 3.8–5.8)
RED CELL DISTRIBUTION WIDTH-SD: 46.5 FL (ref 35.1–46.3)
SODIUM SERPL-SCNC: 137 MMOL/L (ref 136–144)
T AXIS: 2 DEGREES
TOTAL HEMOGLOBIN: 8.9 G/DL (ref 12.6–17.4)
TOTAL HEMOGLOBIN: 9.4 G/DL (ref 12.6–17.4)
TRIGL SERPL-MCNC: 152 MG/DL (ref ?–150)
VENTRICULAR RATE: 65 BPM
VLDLC SERPL CALC-MCNC: 30 MG/DL (ref 5–40)
WBC # BLD AUTO: 8.1 X10(3) UL (ref 4–13)

## 2018-02-13 PROCEDURE — 88341 IMHCHEM/IMCYTCHM EA ADD ANTB: CPT | Performed by: ANESTHESIOLOGY

## 2018-02-13 PROCEDURE — 88342 IMHCHEM/IMCYTCHM 1ST ANTB: CPT | Performed by: ANESTHESIOLOGY

## 2018-02-13 PROCEDURE — 71045 X-RAY EXAM CHEST 1 VIEW: CPT | Performed by: THORACIC SURGERY (CARDIOTHORACIC VASCULAR SURGERY)

## 2018-02-13 PROCEDURE — 88313 SPECIAL STAINS GROUP 2: CPT | Performed by: ANESTHESIOLOGY

## 2018-02-13 PROCEDURE — 99223 1ST HOSP IP/OBS HIGH 75: CPT | Performed by: HOSPITALIST

## 2018-02-13 PROCEDURE — 5A1221Z PERFORMANCE OF CARDIAC OUTPUT, CONTINUOUS: ICD-10-PCS | Performed by: THORACIC SURGERY (CARDIOTHORACIC VASCULAR SURGERY)

## 2018-02-13 PROCEDURE — 02BN0ZZ EXCISION OF PERICARDIUM, OPEN APPROACH: ICD-10-PCS | Performed by: THORACIC SURGERY (CARDIOTHORACIC VASCULAR SURGERY)

## 2018-02-13 PROCEDURE — 36415 COLL VENOUS BLD VENIPUNCTURE: CPT | Performed by: THORACIC SURGERY (CARDIOTHORACIC VASCULAR SURGERY)

## 2018-02-13 PROCEDURE — B24BZZ4 ULTRASONOGRAPHY OF HEART WITH AORTA, TRANSESOPHAGEAL: ICD-10-PCS | Performed by: ANESTHESIOLOGY

## 2018-02-13 RX ORDER — DOBUTAMINE HYDROCHLORIDE 200 MG/100ML
INJECTION INTRAVENOUS CONTINUOUS PRN
Status: DISCONTINUED | OUTPATIENT
Start: 2018-02-13 | End: 2018-02-20

## 2018-02-13 RX ORDER — ONDANSETRON 2 MG/ML
4 INJECTION INTRAMUSCULAR; INTRAVENOUS EVERY 6 HOURS PRN
Status: DISCONTINUED | OUTPATIENT
Start: 2018-02-13 | End: 2018-02-20

## 2018-02-13 RX ORDER — DOCUSATE SODIUM 100 MG/1
100 CAPSULE, LIQUID FILLED ORAL 2 TIMES DAILY
Status: DISCONTINUED | OUTPATIENT
Start: 2018-02-13 | End: 2018-02-20

## 2018-02-13 RX ORDER — MAGNESIUM SULFATE HEPTAHYDRATE 40 MG/ML
2 INJECTION, SOLUTION INTRAVENOUS AS NEEDED
Status: DISCONTINUED | OUTPATIENT
Start: 2018-02-13 | End: 2018-02-14

## 2018-02-13 RX ORDER — DEXMEDETOMIDINE HYDROCHLORIDE 4 UG/ML
INJECTION, SOLUTION INTRAVENOUS CONTINUOUS
Status: DISCONTINUED | OUTPATIENT
Start: 2018-02-13 | End: 2018-02-14

## 2018-02-13 RX ORDER — CEFAZOLIN SODIUM 1 G/3ML
INJECTION, POWDER, FOR SOLUTION INTRAMUSCULAR; INTRAVENOUS
Status: DISCONTINUED | OUTPATIENT
Start: 2018-02-13 | End: 2018-02-13 | Stop reason: HOSPADM

## 2018-02-13 RX ORDER — BISACODYL 10 MG
10 SUPPOSITORY, RECTAL RECTAL
Status: DISCONTINUED | OUTPATIENT
Start: 2018-02-13 | End: 2018-02-20

## 2018-02-13 RX ORDER — HYDROCODONE BITARTRATE AND ACETAMINOPHEN 10; 325 MG/1; MG/1
2 TABLET ORAL EVERY 4 HOURS PRN
Status: DISCONTINUED | OUTPATIENT
Start: 2018-02-13 | End: 2018-02-20

## 2018-02-13 RX ORDER — DEXTROSE AND SODIUM CHLORIDE 5; .45 G/100ML; G/100ML
INJECTION, SOLUTION INTRAVENOUS CONTINUOUS
Status: DISCONTINUED | OUTPATIENT
Start: 2018-02-13 | End: 2018-02-14

## 2018-02-13 RX ORDER — MORPHINE SULFATE 2 MG/ML
4 INJECTION, SOLUTION INTRAMUSCULAR; INTRAVENOUS
Status: DISCONTINUED | OUTPATIENT
Start: 2018-02-13 | End: 2018-02-20

## 2018-02-13 RX ORDER — POTASSIUM CHLORIDE 29.8 MG/ML
40 INJECTION INTRAVENOUS AS NEEDED
Status: DISCONTINUED | OUTPATIENT
Start: 2018-02-13 | End: 2018-02-14

## 2018-02-13 RX ORDER — DEXTROSE MONOHYDRATE 25 G/50ML
50 INJECTION, SOLUTION INTRAVENOUS
Status: DISCONTINUED | OUTPATIENT
Start: 2018-02-13 | End: 2018-02-14

## 2018-02-13 RX ORDER — MORPHINE SULFATE 2 MG/ML
2 INJECTION, SOLUTION INTRAMUSCULAR; INTRAVENOUS
Status: DISCONTINUED | OUTPATIENT
Start: 2018-02-13 | End: 2018-02-20

## 2018-02-13 RX ORDER — ASPIRIN 325 MG
325 TABLET ORAL ONCE
Status: COMPLETED | OUTPATIENT
Start: 2018-02-13 | End: 2018-02-13

## 2018-02-13 RX ORDER — DEXTROSE AND SODIUM CHLORIDE 5; .45 G/100ML; G/100ML
INJECTION, SOLUTION INTRAVENOUS CONTINUOUS
Status: ACTIVE | OUTPATIENT
Start: 2018-02-13 | End: 2018-02-13

## 2018-02-13 RX ORDER — ALBUMIN, HUMAN INJ 5% 5 %
500 SOLUTION INTRAVENOUS ONCE
Status: COMPLETED | OUTPATIENT
Start: 2018-02-13 | End: 2018-02-13

## 2018-02-13 RX ORDER — MIDAZOLAM HYDROCHLORIDE 1 MG/ML
1 INJECTION INTRAMUSCULAR; INTRAVENOUS EVERY 30 MIN PRN
Status: DISCONTINUED | OUTPATIENT
Start: 2018-02-13 | End: 2018-02-14

## 2018-02-13 RX ORDER — BACITRACIN 50000 [USP'U]/1
INJECTION, POWDER, LYOPHILIZED, FOR SOLUTION INTRAMUSCULAR AS NEEDED
Status: DISCONTINUED | OUTPATIENT
Start: 2018-02-13 | End: 2018-02-13 | Stop reason: HOSPADM

## 2018-02-13 RX ORDER — POTASSIUM CHLORIDE 14.9 MG/ML
20 INJECTION INTRAVENOUS AS NEEDED
Status: DISCONTINUED | OUTPATIENT
Start: 2018-02-13 | End: 2018-02-14

## 2018-02-13 RX ORDER — ALBUMIN, HUMAN INJ 5% 5 %
250 SOLUTION INTRAVENOUS ONCE AS NEEDED
Status: COMPLETED | OUTPATIENT
Start: 2018-02-13 | End: 2018-02-13

## 2018-02-13 RX ORDER — CHLORHEXIDINE GLUCONATE 0.12 MG/ML
15 RINSE ORAL
Status: DISCONTINUED | OUTPATIENT
Start: 2018-02-13 | End: 2018-02-14

## 2018-02-13 RX ORDER — CEFAZOLIN SODIUM/WATER 2 G/20 ML
2 SYRINGE (ML) INTRAVENOUS EVERY 8 HOURS
Status: COMPLETED | OUTPATIENT
Start: 2018-02-13 | End: 2018-02-15

## 2018-02-13 RX ORDER — IPRATROPIUM BROMIDE AND ALBUTEROL SULFATE 2.5; .5 MG/3ML; MG/3ML
3 SOLUTION RESPIRATORY (INHALATION)
Status: DISCONTINUED | OUTPATIENT
Start: 2018-02-13 | End: 2018-02-14

## 2018-02-13 RX ORDER — SODIUM CHLORIDE 9 MG/ML
INJECTION, SOLUTION INTRAVENOUS CONTINUOUS
Status: DISCONTINUED | OUTPATIENT
Start: 2018-02-13 | End: 2018-02-14

## 2018-02-13 RX ORDER — ACETAMINOPHEN 10 MG/ML
1000 INJECTION, SOLUTION INTRAVENOUS EVERY 6 HOURS
Status: COMPLETED | OUTPATIENT
Start: 2018-02-13 | End: 2018-02-14

## 2018-02-13 RX ORDER — FAMOTIDINE 10 MG/ML
20 INJECTION, SOLUTION INTRAVENOUS 2 TIMES DAILY
Status: DISCONTINUED | OUTPATIENT
Start: 2018-02-13 | End: 2018-02-14

## 2018-02-13 RX ORDER — POLYETHYLENE GLYCOL 3350 17 G/17G
1 POWDER, FOR SOLUTION ORAL DAILY PRN
Status: DISCONTINUED | OUTPATIENT
Start: 2018-02-13 | End: 2018-02-20

## 2018-02-13 RX ORDER — GABAPENTIN 300 MG/1
900 CAPSULE ORAL 2 TIMES DAILY
Status: DISCONTINUED | OUTPATIENT
Start: 2018-02-13 | End: 2018-02-20

## 2018-02-13 RX ORDER — HYDROCODONE BITARTRATE AND ACETAMINOPHEN 10; 325 MG/1; MG/1
1 TABLET ORAL EVERY 4 HOURS PRN
Status: DISCONTINUED | OUTPATIENT
Start: 2018-02-13 | End: 2018-02-20

## 2018-02-13 RX ORDER — MAGNESIUM SULFATE 1 G/100ML
1 INJECTION INTRAVENOUS AS NEEDED
Status: DISCONTINUED | OUTPATIENT
Start: 2018-02-13 | End: 2018-02-14

## 2018-02-13 RX ORDER — ASPIRIN 81 MG/1
81 TABLET ORAL DAILY
Status: DISCONTINUED | OUTPATIENT
Start: 2018-02-14 | End: 2018-02-20

## 2018-02-13 RX ORDER — NITROGLYCERIN 20 MG/100ML
INJECTION INTRAVENOUS CONTINUOUS PRN
Status: DISCONTINUED | OUTPATIENT
Start: 2018-02-13 | End: 2018-02-20

## 2018-02-13 RX ORDER — TEMAZEPAM 15 MG/1
15 CAPSULE ORAL NIGHTLY PRN
Status: DISCONTINUED | OUTPATIENT
Start: 2018-02-13 | End: 2018-02-20

## 2018-02-13 RX ORDER — FAMOTIDINE 20 MG/1
20 TABLET ORAL 2 TIMES DAILY
Status: DISCONTINUED | OUTPATIENT
Start: 2018-02-13 | End: 2018-02-20

## 2018-02-13 RX ORDER — MORPHINE SULFATE 2 MG/ML
8 INJECTION, SOLUTION INTRAMUSCULAR; INTRAVENOUS
Status: DISCONTINUED | OUTPATIENT
Start: 2018-02-13 | End: 2018-02-20

## 2018-02-13 RX ORDER — ASPIRIN 300 MG
300 SUPPOSITORY, RECTAL RECTAL ONCE
Status: COMPLETED | OUTPATIENT
Start: 2018-02-13 | End: 2018-02-13

## 2018-02-13 NOTE — CONSULTS
[facility]  [address] [place] [address] Patient Status:  Inpatient    1940 MRN [id number]   [facility] 6NE-A Attending Talia Sapp MD   Hosp Day # 0 PCP Rafi Dai MD     Reason for consult: COPD, SEBLE  Requested by herniorrhaphy with composix mesh  02/11/2005: OTHER SURGICAL HISTORY      Comment: surgery for abdominal aortic aneurysm  1/30/2015: OTHER SURGICAL HISTORY      Comment: aortic valve replacement  4/1/2016: REVISE MEDIAN N/CARPAL TUNNEL SURG Left      Comme torsemide 20 MG Oral Tab Take 2 tablets (40 mg total) by mouth daily. (Patient taking differently: Take 40 mg by mouth 2 (two) times daily.  ) Disp: 180 tablet Rfl: 0   aspirin 81 MG Oral Tab EC Take 1 tablet by mouth daily.  Disp: 30 tablet Rfl: 11 pulmonology  7.  Coagulopathy-monitor    Quality:  · DVT Prophylaxis: SCDs  · CODE status: Full  Plan of care discussed with CCU staff, Cardiology     Yojana Cruz MD  2/13/2018

## 2018-02-13 NOTE — PROGRESS NOTES
[facility]  Progress Note    Paddy Louis Patient Status:  Inpatient    1940 MRN [id number]   [facility] 6NE-A Attending Mart Chavez MD   Hosp Day # 0 PCP Gerson Ledesma MD         SEE Dr Garcia Griggs cardiology consult date Chlorhexidine Gluconate  15 mL Mouth/Throat [email or web address]   • ipratropium-albuterol  3 mL Nebulization 6 times per day   • Fluticasone Furoate-Vilanterol  1 puff Inhalation Daily   • gabapentin  900 mg Oral BID   • [START ON 2/14/2018] aspirin  81 mg Oral

## 2018-02-13 NOTE — CONSULTS
[facility]  Report of Consultation    Monika Conteh Patient Status:  Inpatient    1940 MRN [id number]   [facility] 6NE-A Attending Haylee Nicholas MD   Hosp Day # 0 PCP Lanre Morales MD     Reason for Consultation:    Maciel Hay Other and unspecified hyperlipidemia    • Pericarditis    • Pericarditis    • Peripheral vascular disease ([facility] [address]    • Renal disorder     hx of nephrolithiasis (hx of multiple UTI's)   • Shortness of breath    • Shortness of breath    • Sleep apnea     was w Fluticasone-Salmeterol 232-14 MCG/ACT Inhalation Aerosol Powder, Breath Activated Inhale 1 puff into the lungs 2 (two) times daily. Disp:  Rfl:  2/12/2018 at am   gabapentin 300 MG Oral Cap Take 900 mg by mouth 2 (two) times daily.  Disp:  Rfl:  2/12/2018 72 24 100 % -   02/13/18 1115 - - - 65 11 100 % -   02/13/18 0615 125/51 97.7 °F (36.5 °C) Temporal 65 18 100 % 181 lb 5 oz (82.2 kg)           Physical Exam:   General: alert, nods appropriately partially sedated and remains on mechanical ventilation.   No insufficiency     Diabetic polyneuropathy associated with type 2 diabetes mellitus (HCC)     Sensory hearing loss, bilateral     Mild pulmonary hypertension     Dyspnea     Dyspnea, unspecified type     Acute chest pain     CHF (congestive heart failure), this patient carefully with you        Guru Loving  2/13/2018  40min   3:51 PM

## 2018-02-13 NOTE — PLAN OF CARE
Received from OR accompanied by , intubated and vented, on precedex gtt. Insulin per protocol. Albumin given initially for sbp dropping into 80's, responded well.  Precedex weaned to off, dropped sbp to low 80's, updated , additional albumin

## 2018-02-13 NOTE — ANESTHESIA POSTPROCEDURE EVALUATION
[address] Patient Status:  Inpatient   Age/Gender 69year old male MRN [id number]   [facility] [place] 6NE-A Attending Angela Castelan MD   Hosp Day # 0 PCP Riddhi Aldana MD       Anesthesia Post-op Note    Procedure(s):

## 2018-02-13 NOTE — HISTORICAL OFFICE NOTE
Carlos Enrique Glass  : 1940  ACCOUNT:  [id number]  [phone]  PCP: Dr. Brisa Kaiser     TODAY'S DATE: 2018  DICTATED BY:  ARTHUR Lopez]      CHIEF COMPLAINT: [Followup of Dyspnea, Followup of Pericarditis and constrictive.]    HPI: dyslipidemia, hypertension, diabetes type II, pericarditis, PVD, aortic stenosis, aortic insuffiency, aortic tube graft w/re-incorporation L renal artery, cardiac catheterization, left right cardiac catheterization June 2017 and CRISTOBAL    FAMILY HISTORY: Melani Begum increase in his oxygen demand. Patient was taken over to Dr. Margareth Blakely office on the way out today to schedule an appointment. ASSESSMENT:  1. Aortic stenosis  2. Dyspnea  3. Aneurysm, abdominal  4. Aortic insufficiency  5.  COPD  6. DM, Type II, with PV di

## 2018-02-13 NOTE — ANESTHESIA PREPROCEDURE EVALUATION
PRE-OP EVALUATION    Patient Name: Estrada Grande    Pre-op Diagnosis: pericarditis    Procedure(s):  re do pericardiectomy    Surgeon(s) and Role:     * Ella Corbin MD - Primary    Pre-op vitals reviewed.     Ht 1.854 m (6' 1\")   Wt 85.3 kg (188 lb) ventricle: The cavity size was normal. Wall thickness was normal.     Systolic function was normal. The estimated ejection fraction was 60-65%.      Although no diagnostic regional wall motion abnormality was identified,     this possibility cannot be compl cardiac catheterization today, was demonstrated to have angiographically nonobstructive coronary atherosclerosis with preserved LV systolic function. He has no significant gradient across his bioprosthetic aortic valve.   He does demonstrate mild aortic in Anjali Mahan MD;  Location: [facility]   07/21/2017: VALVE REPAIR      Comment: [facility] in [place], [place]  01/30/2015: VALVE REPLACEMENT      Comment: AVR at [address]     Smoking status: Former Smoker  2.00 Packs/day  For 50.00 Years     Types: Cigarettes    Quit date:

## 2018-02-14 ENCOUNTER — APPOINTMENT (OUTPATIENT)
Dept: GENERAL RADIOLOGY | Facility: HOSPITAL | Age: 78
DRG: 270 | End: 2018-02-14
Attending: THORACIC SURGERY (CARDIOTHORACIC VASCULAR SURGERY)
Payer: MEDICARE

## 2018-02-14 PROBLEM — Z98.890 S/P PERICARDIOCENTESIS: Status: ACTIVE | Noted: 2018-02-14

## 2018-02-14 LAB
ATRIAL RATE: 73 BPM
BASOPHILS # BLD AUTO: 0.01 X10(3) UL (ref 0–0.1)
BASOPHILS NFR BLD AUTO: 0.1 %
BUN BLD-MCNC: 22 MG/DL (ref 8–20)
CALCIUM BLD-MCNC: 8.5 MG/DL (ref 8.3–10.3)
CHLORIDE: 106 MMOL/L (ref 101–111)
CO2: 32 MMOL/L (ref 22–32)
CREAT BLD-MCNC: 1.16 MG/DL (ref 0.7–1.3)
EOSINOPHIL # BLD AUTO: 0 X10(3) UL (ref 0–0.3)
EOSINOPHIL NFR BLD AUTO: 0 %
ERYTHROCYTE [DISTWIDTH] IN BLOOD BY AUTOMATED COUNT: 15.8 % (ref 11.5–16)
GLUCOSE BLD-MCNC: 106 MG/DL (ref 65–99)
GLUCOSE BLD-MCNC: 108 MG/DL (ref 70–99)
GLUCOSE BLD-MCNC: 110 MG/DL (ref 65–99)
GLUCOSE BLD-MCNC: 117 MG/DL (ref 65–99)
GLUCOSE BLD-MCNC: 119 MG/DL (ref 65–99)
GLUCOSE BLD-MCNC: 122 MG/DL (ref 65–99)
GLUCOSE BLD-MCNC: 124 MG/DL (ref 65–99)
GLUCOSE BLD-MCNC: 128 MG/DL (ref 65–99)
GLUCOSE BLD-MCNC: 132 MG/DL (ref 65–99)
GLUCOSE BLD-MCNC: 143 MG/DL (ref 65–99)
GLUCOSE BLD-MCNC: 144 MG/DL (ref 65–99)
GLUCOSE BLD-MCNC: 160 MG/DL (ref 70–99)
GLUCOSE BLD-MCNC: 174 MG/DL (ref 70–99)
GLUCOSE BLD-MCNC: 176 MG/DL (ref 70–99)
GLUCOSE BLD-MCNC: 190 MG/DL (ref 70–99)
GLUCOSE BLD-MCNC: 197 MG/DL (ref 70–99)
GLUCOSE BLD-MCNC: 222 MG/DL (ref 65–99)
GLUCOSE BLD-MCNC: 241 MG/DL (ref 65–99)
GLUCOSE BLD-MCNC: 87 MG/DL (ref 65–99)
HAV IGM SER QL: 2.2 MG/DL (ref 1.7–3)
HCT VFR BLD AUTO: 27.5 % (ref 37–53)
HGB BLD-MCNC: 8.3 G/DL (ref 13–17)
IMMATURE GRANULOCYTE COUNT: 0.07 X10(3) UL (ref 0–1)
IMMATURE GRANULOCYTE RATIO %: 0.5 %
INR BLD: 1.24 (ref 0.89–1.11)
ISTAT ACTIVATED CLOTTING TIME: 142 SECONDS (ref 74–137)
ISTAT ACTIVATED CLOTTING TIME: 158 SECONDS (ref 74–137)
ISTAT ACTIVATED CLOTTING TIME: 406 SECONDS (ref 74–137)
ISTAT ACTIVATED CLOTTING TIME: 406 SECONDS (ref 74–137)
ISTAT ACTIVATED CLOTTING TIME: 422 SECONDS (ref 74–137)
ISTAT ACTIVATED CLOTTING TIME: 610 SECONDS (ref 74–137)
ISTAT BLOOD GAS BASE EXCESS: 11 MMOL/L
ISTAT BLOOD GAS BASE EXCESS: 16 MMOL/L
ISTAT BLOOD GAS BASE EXCESS: 7 MMOL/L
ISTAT BLOOD GAS BASE EXCESS: 9 MMOL/L
ISTAT BLOOD GAS BASE EXCESS: 9 MMOL/L
ISTAT BLOOD GAS HCO3: 31.9 MEQ/L (ref 22–26)
ISTAT BLOOD GAS HCO3: 33.1 MEQ/L (ref 22–26)
ISTAT BLOOD GAS HCO3: 33.3 MEQ/L (ref 22–26)
ISTAT BLOOD GAS HCO3: 34.1 MEQ/L (ref 22–26)
ISTAT BLOOD GAS HCO3: 39.1 MEQ/L (ref 22–26)
ISTAT BLOOD GAS O2 SATURATION: 100 % (ref 92–100)
ISTAT BLOOD GAS O2 SATURATION: 56 % (ref 92–100)
ISTAT BLOOD GAS O2 SATURATION: 76 % (ref 92–100)
ISTAT BLOOD GAS O2 SATURATION: 83 % (ref 92–100)
ISTAT BLOOD GAS O2 SATURATION: 99 % (ref 92–100)
ISTAT BLOOD GAS PCO2: 38 MMHG (ref 35–45)
ISTAT BLOOD GAS PCO2: 41 MMHG (ref 35–45)
ISTAT BLOOD GAS PCO2: 42 MMHG (ref 35–45)
ISTAT BLOOD GAS PCO2: 47 MMHG (ref 35–45)
ISTAT BLOOD GAS PCO2: 52 MMHG (ref 35–45)
ISTAT BLOOD GAS PH: 7.4 (ref 7.35–7.45)
ISTAT BLOOD GAS PH: 7.5 (ref 7.35–7.45)
ISTAT BLOOD GAS PH: 7.5 (ref 7.35–7.45)
ISTAT BLOOD GAS PH: 7.53 (ref 7.35–7.45)
ISTAT BLOOD GAS PH: 7.56 (ref 7.35–7.45)
ISTAT BLOOD GAS PO2: 126 MMHG (ref 80–105)
ISTAT BLOOD GAS PO2: 156 MMHG (ref 80–105)
ISTAT BLOOD GAS PO2: 22 MMHG (ref 80–105)
ISTAT BLOOD GAS PO2: 32 MMHG (ref 80–105)
ISTAT BLOOD GAS PO2: 33 MMHG (ref 80–105)
ISTAT BLOOD GAS TCO2: 33 MMOL/L (ref 22–32)
ISTAT BLOOD GAS TCO2: 35 MMOL/L (ref 22–32)
ISTAT BLOOD GAS TCO2: 41 MMOL/L (ref 22–32)
ISTAT HEMATOCRIT: 25 % (ref 37–53)
ISTAT HEMATOCRIT: 25 % (ref 37–53)
ISTAT HEMATOCRIT: 26 % (ref 37–53)
ISTAT HEMATOCRIT: 29 % (ref 37–53)
ISTAT HEMATOCRIT: 34 % (ref 37–53)
ISTAT IONIZED CALCIUM: 1.08 MMOL/L (ref 1.12–1.32)
ISTAT IONIZED CALCIUM: 1.08 MMOL/L (ref 1.12–1.32)
ISTAT IONIZED CALCIUM: 1.09 MMOL/L (ref 1.12–1.32)
ISTAT IONIZED CALCIUM: 1.13 MMOL/L (ref 1.12–1.32)
ISTAT IONIZED CALCIUM: 1.15 MMOL/L (ref 1.12–1.32)
ISTAT PATIENT TEMPERATURE: 32 DEGREE
ISTAT PATIENT TEMPERATURE: 34 DEGREE
ISTAT PATIENT TEMPERATURE: 34 DEGREE
ISTAT POTASSIUM: 2.8 MMOL/L (ref 3.6–5.1)
ISTAT POTASSIUM: 3.6 MMOL/L (ref 3.6–5.1)
ISTAT POTASSIUM: 3.7 MMOL/L (ref 3.6–5.1)
ISTAT POTASSIUM: 3.8 MMOL/L (ref 3.6–5.1)
ISTAT POTASSIUM: 4.5 MMOL/L (ref 3.6–5.1)
ISTAT SODIUM: 131 MMOL/L (ref 136–144)
ISTAT SODIUM: 133 MMOL/L (ref 136–144)
ISTAT SODIUM: 136 MMOL/L (ref 136–144)
ISTAT SODIUM: 138 MMOL/L (ref 136–144)
ISTAT SODIUM: 138 MMOL/L (ref 136–144)
LYMPHOCYTES # BLD AUTO: 0.3 X10(3) UL (ref 0.9–4)
LYMPHOCYTES NFR BLD AUTO: 2.3 %
MCH RBC QN AUTO: 26.3 PG (ref 27–33.2)
MCHC RBC AUTO-ENTMCNC: 30.2 G/DL (ref 31–37)
MCV RBC AUTO: 87.3 FL (ref 80–99)
MONOCYTES # BLD AUTO: 0.43 X10(3) UL (ref 0.1–1)
MONOCYTES NFR BLD AUTO: 3.3 %
NEUTROPHIL ABS PRELIM: 12.27 X10 (3) UL (ref 1.3–6.7)
NEUTROPHILS # BLD AUTO: 12.27 X10(3) UL (ref 1.3–6.7)
NEUTROPHILS NFR BLD AUTO: 93.8 %
P AXIS: 53 DEGREES
P-R INTERVAL: 222 MS
PHOSPHATE SERPL-MCNC: 4 MG/DL (ref 2.5–4.9)
PLATELET # BLD AUTO: 142 10(3)UL (ref 150–450)
POTASSIUM SERPL-SCNC: 4.5 MMOL/L (ref 3.6–5.1)
PSA SERPL DL<=0.01 NG/ML-MCNC: 15.7 SECONDS (ref 12–14.3)
Q-T INTERVAL: 414 MS
QRS DURATION: 126 MS
QTC CALCULATION (BEZET): 456 MS
R AXIS: -6 DEGREES
RBC # BLD AUTO: 3.15 X10(6)UL (ref 3.8–5.8)
RED CELL DISTRIBUTION WIDTH-SD: 50.4 FL (ref 35.1–46.3)
SODIUM SERPL-SCNC: 142 MMOL/L (ref 136–144)
T AXIS: 3 DEGREES
VENTRICULAR RATE: 73 BPM
WBC # BLD AUTO: 13.1 X10(3) UL (ref 4–13)

## 2018-02-14 PROCEDURE — 71045 X-RAY EXAM CHEST 1 VIEW: CPT | Performed by: THORACIC SURGERY (CARDIOTHORACIC VASCULAR SURGERY)

## 2018-02-14 PROCEDURE — 99232 SBSQ HOSP IP/OBS MODERATE 35: CPT | Performed by: HOSPITALIST

## 2018-02-14 RX ORDER — IPRATROPIUM BROMIDE AND ALBUTEROL SULFATE 2.5; .5 MG/3ML; MG/3ML
3 SOLUTION RESPIRATORY (INHALATION)
Status: DISCONTINUED | OUTPATIENT
Start: 2018-02-14 | End: 2018-02-16

## 2018-02-14 RX ORDER — HEPARIN SODIUM 5000 [USP'U]/ML
5000 INJECTION, SOLUTION INTRAVENOUS; SUBCUTANEOUS EVERY 12 HOURS SCHEDULED
Status: DISCONTINUED | OUTPATIENT
Start: 2018-02-14 | End: 2018-02-20

## 2018-02-14 RX ORDER — FUROSEMIDE 10 MG/ML
40 INJECTION INTRAMUSCULAR; INTRAVENOUS
Status: DISCONTINUED | OUTPATIENT
Start: 2018-02-14 | End: 2018-02-20

## 2018-02-14 RX ORDER — ACETAMINOPHEN 500 MG
500 TABLET ORAL EVERY 6 HOURS PRN
Status: DISCONTINUED | OUTPATIENT
Start: 2018-02-14 | End: 2018-02-20

## 2018-02-14 RX ORDER — SPIRONOLACTONE 25 MG/1
25 TABLET ORAL
Status: DISCONTINUED | OUTPATIENT
Start: 2018-02-14 | End: 2018-02-20

## 2018-02-14 RX ORDER — FUROSEMIDE 10 MG/ML
40 INJECTION INTRAMUSCULAR; INTRAVENOUS ONCE
Status: COMPLETED | OUTPATIENT
Start: 2018-02-14 | End: 2018-02-14

## 2018-02-14 RX ORDER — FUROSEMIDE 10 MG/ML
40 INJECTION INTRAMUSCULAR; INTRAVENOUS DAILY
Status: DISCONTINUED | OUTPATIENT
Start: 2018-02-15 | End: 2018-02-14 | Stop reason: SDUPTHER

## 2018-02-14 RX ORDER — DEXTROSE MONOHYDRATE 25 G/50ML
50 INJECTION, SOLUTION INTRAVENOUS
Status: DISCONTINUED | OUTPATIENT
Start: 2018-02-14 | End: 2018-02-20

## 2018-02-14 NOTE — PROGRESS NOTES
[facility]  Progress Note     Sam Goodwin Patient Status:  Inpatient    1940 MRN [id number]   [facility] 6NE-A Attending Melvi Dangelo MD   Hosp Day # 1 PCP Makayla Brink MD     Chief Complaint: Constrictive pericardi Gluconate  15 mL Mouth/Throat [email or web address]   • ipratropium-albuterol  3 mL Nebulization 6 times per day   • Fluticasone Furoate-Vilanterol  1 puff Inhalation Daily   • gabapentin  900 mg Oral BID   • aspirin  81 mg Oral Daily   • docusate sodium  100 mg O

## 2018-02-14 NOTE — PROGRESS NOTES
S/P- Redo sternotomy and pericardectomy. POD#1   Pt was extubated yesterdaty and doing well. Sitting up. Happy with care. No c/o any awareness. Teeth intact.   /65 (BP Location: Right arm)   Pulse 72   Temp 97.7 °F (36.5 °C) (Temporal)   Resp 21

## 2018-02-14 NOTE — PHYSICAL THERAPY NOTE
PHYSICAL THERAPY EVALUATION - INPATIENT     Room Number: 6716/1387-C  Evaluation Date: 2/14/2018  Type of Evaluation: Initial  Physician Order: PT Eval and Treat    Presenting Problem: s/p re-do sternotomy, pericardectomy 2/13/18  Reason for Therapy: • Visual impairment        Past Surgical History  Past Surgical History:  06/16/2017: ANGIOGRAM      Comment: [facility]  1994: ARTHROSCOPY OF JOINT UNLISTED Left      Comment: knee  1980: CHOLECYSTECTOMY  2006: HERNIA SURGERY      Comment: ventral hernia repair Hip flexion  4/5  Left Hip flexion  3+/5  Right Knee extension  4/5  Left Knee extension  3+/5    BALANCE  Static Sitting: Fair +  Dynamic Sitting: Fair  Static Standing: Poor +  Dynamic Standing: Poor +    ADDITIONAL TESTS reports difficulty with pursed lip breathing due to deviated septum. Pt with 02 desaturation to 87% on RA with ambulation. Pt reports RPE on Modified ALICIA 8/10 with ambulation. Pt returned to sitting up in bedside chair.  Reviewed sternal precautions, POC, training;Transfer training;Balance training  Rehab Potential : Good  Frequency (Obs): 5x/week  Number of Visits to Meet Established Goals: 5      CURRENT GOALS    Goal #1 Patient is able to demonstrate supine - sit EOB @ level: modified independent     [facility]

## 2018-02-14 NOTE — PROGRESS NOTES
[facility]  CV Surgery Progress Note    Carroll James Patient Status:  Inpatient    1940 MRN [id number]   [facility] 6NE-A Attending Rodrigo Brooke MD   Hosp Day # 1 PCP Brain Lindquist MD     Subjective:  Pt seen sitting up

## 2018-02-14 NOTE — PROGRESS NOTES
ENDOCRINOLOGY PROGRESS NOTE    Typed by Haily Clifford MD on 2/14/2018      S:  Pt resting in bed. Felt \"low\" when the glucose was 87 today. In good spirits. Family at bedside.       O:  /79 (BP Location: Right arm)   Pulse 72   Temp (!) 9 - 13.0 x10(3) uL 13.1 (H)   RBC      3.80 - 5.80 x10(6)uL 3.15 (L)   Hemoglobin      13.0 - 17.0 g/dL 8.3 (L)   Hematocrit      37.0 - 53.0 % 27.5 (L)   Platelet Count      626.9 - 450.0 10(3)uL 142.0 (L)           Assessment and Plan:    1.  Type 2 DM with

## 2018-02-14 NOTE — PLAN OF CARE
CARDIOVASCULAR - ADULT    • Maintains optimal cardiac output and hemodynamic stability Progressing    • Absence of cardiac arrhythmias or at baseline Progressing        Assumed care of pt at 1930. Pt A&O x4.  Lung sounds clear and diminished, on 4 L n/c and

## 2018-02-14 NOTE — OCCUPATIONAL THERAPY NOTE
OCCUPATIONAL THERAPY EVALUATION - INPATIENT     Room Number: 1276/6857-G  Evaluation Date: 2/14/2018  Type of Evaluation: Initial  Presenting Problem: sternotomy and pericardectomy    Physician Order: IP Consult to Occupational Therapy  Reason for Therapy: Past Surgical History  Past Surgical History:  06/16/2017: ANGIOGRAM      Comment: [facility]  1994: ARTHROSCOPY OF JOINT UNLISTED Left      Comment: knee  1980: CHOLECYSTECTOMY  2006: HERNIA SURGERY      Comment: ventral hernia repair; complex ventral needs and wants    Behavioral/Emotional/Social: Pt was participated in and was motivated for therapy today.     RANGE OF MOTION AND STRENGTH ASSESSMENT  Upper extremity ROM is within functional limits     Upper extremity strength is within functional limits measures completed include AMPAC, MMT, ROM. In this OT evaluation patient presents with the following performance deficits: endurance, knowledge of precautions, safety, and pain.  These deficits impact the patient’s ability to participate in ADLs, instrumen demonstrate ability to follow sternal precautions with independently  Pt will complete all ADLs and IADLs while following sternal precautions with 1 verbal cue  Pt will stand at sink for 5 minutes to complete grooming routine  Pt will verbalize at least 3

## 2018-02-14 NOTE — CDS QUERY
Mechanical Ventilation  CLINICAL DOCUMENTATION CLARIFICATION FORM  Dear Doctor Giselle Davis,   Clinical information (provided below) indicates acute respiratory failure with potential association.  For accurate ICD-10-CM code assignment to reflect severity of illn

## 2018-02-14 NOTE — CONSULTS
ENDOCRINOLOGY CONSULTATION    Attending physician:  Alyssa Murphy MD  Consulting physican:  James Cordero MD    Admission Date:  2/13/2018  Consultation Date:  2/13/2018      Reason for consultation: Mgmt of Type 2 DM    Chief Complaint:  Admitted f murmur    • Hyperlipidemia LDL goal <70    • Hypertension, essential, benign    • Microalbuminuria due to type 2 diabetes mellitus (HCC)    • Neuropathy     hands, legs, feet   • Pericarditis    • Peripheral vascular disease ([address]    • Renal disorder     h gabapentin 300 MG Oral Cap Take 900 mg by mouth 2 (two) times daily. Disp:  Rfl:    glimepiride 2 MG Oral Tab Take 1 tablet (2 mg total) by mouth 2 (two) times daily.  Disp: 180 tablet Rfl: 0   torsemide 20 MG Oral Tab Take 2 tablets (40 mg total) by mout [COMPLETED] Albumin Human (ALBUMINAR) 5 % solution 250 mL 250 mL Intravenous Once PRN   DOBUTamine in D5W (DOBUTREX) 500 mg/250 ml infusion 2.5-40 mcg/kg/min Intravenous Continuous PRN   nitroGLYCERIN infusion 50mg in D5W 250ml 5-300 mcg/min Intravenous Glucose-Vitamin C (DEX-4) 4-0.006 g chewable tab 4 tablet 4 tablet Oral Q15 Min PRN   Or      dextrose 50% injection 50 mL 50 mL Intravenous Q15 Min PRN   Or      glucose (DEX4) oral liquid 30 g 30 g Oral Q15 Min PRN   Or      Glucose-Vitamin C (DEX-4) 4 Clear to ausculation  Abd:  soft, nondistended,   Exts:  No lower extremity edema; 2+ dorsalis pedis and posterior tibialis pulses  Neuro:  Reflexes: 2+ biceps. Skin:  SARAY hose in place.            Laboratory Data      Component      Latest Ref Rng & Units microalbuminuria  6. Dyslipidemia: LDL not at target. · Per cardio mgmt  · On aspirin  · Not on ACE/ARB or statin. If no contraindication, would recommend starting these. We will follow with you. Thank you for the consultation.     Fabian Mondragon,

## 2018-02-14 NOTE — OPERATIVE REPORT
[facility]    PATIENT'S NAME: Saskia Landeros   ATTENDING PHYSICIAN: Jessi Galindo MD   OPERATING PHYSICIAN: Jessi Galindo MD   PATIENT ACCOUNT#:   [de-identified]    LOCATION:  [address] [place] [address]  MEDICAL RECORD #:   [id number]       YOB: 1940 bad.  The patient was prepped and draped.   I opened the left groin and exposed the common femoral artery and vein as I thought from the CT we may need to go on bypass before opening the chest, as the right heart was plastered against the back of the sternu the femoral artery and vein were repaired without difficulty. After protamine was administered, bleeding slowed down to an acceptable level. Chest was closed in the usual fashion.   The patient was taken to the ICU in stable condition having required no b

## 2018-02-14 NOTE — DIETARY NOTE
Nutrition Short Note    Dietitian consult received per cardiac rehab/CHF protocol. Pt to be educated by cardiac rehab staff and encouraged to attend outpatient classes taught by RD. RD available PRN.     Socorro Mccann MS, RD, LDN

## 2018-02-14 NOTE — PROGRESS NOTES
[facility]  Progress Note    Sam Goodwin Patient Status:  Inpatient    1940 MRN [id number]   [facility] 6NE-A Attending Melvi Dangelo MD   Hosp Day # 1 PCP Makayla Brink MD       Subjective:  Feels quite good pod 1.  [employer] CXR:    1.  Stable cardiac size with mild vascular congestion. 2.  Lucency identified within the superior mediastinum may represent pneumomediastinum. If further evaluation is needed, consider CT.   3.  Lucencies underneath the right hemidiaphragm a Ct/dominguez to remain in overnight per dr coates. Continue to increase activity. Follow hgb        Discussed plan of care with patient and nursing. ARTHUR Oliva  2/14/2018  3:08 PM    Seen and examined earlier today.  Discussed in detail with Dr. Lance Rene

## 2018-02-14 NOTE — CM/SW NOTE
02/14/18 1500   CM/SW Referral Data   Referral Source Physician   Reason for Referral Discharge planning;Protocol order set   Specify order set (CV surgery)   Informant Patient;Spouse; Children   Readmission Assessment   Factors that patient feels contri

## 2018-02-14 NOTE — PROGRESS NOTES
[facility]  Progress Note    Miranda Clayton Patient Status:  Inpatient    1940 MRN [id number]   [facility] 6NE-A Attending Lorne Hassan MD   [facility] Day # 1 PCP Marleen Jorge MD     Subjective:  Miranda Clayton is a(n 69 ye MCH   --   26.3*  26.3*   MCHC   --   31.0  30.2*   RDW   --   15.4  15.8   NEPRELIM   --    --   12.27*   WBC   --   8.1  13.1*   PLT  118.0*  143.0*  142.0*     Recent Labs   Lab  02/13/18   1118  02/14/18   0324   GLU  183*  108*   BUN  20  22*   CREA

## 2018-02-15 ENCOUNTER — APPOINTMENT (OUTPATIENT)
Dept: GENERAL RADIOLOGY | Facility: HOSPITAL | Age: 78
DRG: 270 | End: 2018-02-15
Attending: THORACIC SURGERY (CARDIOTHORACIC VASCULAR SURGERY)
Payer: MEDICARE

## 2018-02-15 ENCOUNTER — APPOINTMENT (OUTPATIENT)
Dept: GENERAL RADIOLOGY | Facility: HOSPITAL | Age: 78
DRG: 270 | End: 2018-02-15
Attending: INTERNAL MEDICINE
Payer: MEDICARE

## 2018-02-15 LAB
BASOPHILS # BLD AUTO: 0.03 X10(3) UL (ref 0–0.1)
BASOPHILS NFR BLD AUTO: 0.4 %
BLOOD TYPE BARCODE: 5100
BUN BLD-MCNC: 21 MG/DL (ref 8–20)
CALCIUM BLD-MCNC: 8.8 MG/DL (ref 8.3–10.3)
CHLORIDE: 102 MMOL/L (ref 101–111)
CO2: 32 MMOL/L (ref 22–32)
CREAT BLD-MCNC: 1.06 MG/DL (ref 0.7–1.3)
EOSINOPHIL # BLD AUTO: 0.04 X10(3) UL (ref 0–0.3)
EOSINOPHIL NFR BLD AUTO: 0.5 %
ERYTHROCYTE [DISTWIDTH] IN BLOOD BY AUTOMATED COUNT: 16.2 % (ref 11.5–16)
GLUCOSE BLD-MCNC: 116 MG/DL (ref 65–99)
GLUCOSE BLD-MCNC: 127 MG/DL (ref 70–99)
GLUCOSE BLD-MCNC: 155 MG/DL (ref 65–99)
GLUCOSE BLD-MCNC: 168 MG/DL (ref 65–99)
GLUCOSE BLD-MCNC: 169 MG/DL (ref 65–99)
GLUCOSE BLD-MCNC: 201 MG/DL (ref 65–99)
HCT VFR BLD AUTO: 29.3 % (ref 37–53)
HGB BLD-MCNC: 8.9 G/DL (ref 13–17)
IMMATURE GRANULOCYTE COUNT: 0.04 X10(3) UL (ref 0–1)
IMMATURE GRANULOCYTE RATIO %: 0.5 %
LYMPHOCYTES # BLD AUTO: 0.55 X10(3) UL (ref 0.9–4)
LYMPHOCYTES NFR BLD AUTO: 6.9 %
MCH RBC QN AUTO: 26.4 PG (ref 27–33.2)
MCHC RBC AUTO-ENTMCNC: 30.4 G/DL (ref 31–37)
MCV RBC AUTO: 86.9 FL (ref 80–99)
MONOCYTES # BLD AUTO: 0.4 X10(3) UL (ref 0.1–1)
MONOCYTES NFR BLD AUTO: 5 %
NEUTROPHIL ABS PRELIM: 6.9 X10 (3) UL (ref 1.3–6.7)
NEUTROPHILS # BLD AUTO: 6.9 X10(3) UL (ref 1.3–6.7)
NEUTROPHILS NFR BLD AUTO: 86.7 %
PLATELET # BLD AUTO: 138 10(3)UL (ref 150–450)
POTASSIUM SERPL-SCNC: 4.2 MMOL/L (ref 3.6–5.1)
RBC # BLD AUTO: 3.37 X10(6)UL (ref 3.8–5.8)
RED CELL DISTRIBUTION WIDTH-SD: 51.1 FL (ref 35.1–46.3)
SODIUM SERPL-SCNC: 139 MMOL/L (ref 136–144)
WBC # BLD AUTO: 8 X10(3) UL (ref 4–13)

## 2018-02-15 PROCEDURE — 99232 SBSQ HOSP IP/OBS MODERATE 35: CPT | Performed by: HOSPITALIST

## 2018-02-15 PROCEDURE — 71045 X-RAY EXAM CHEST 1 VIEW: CPT | Performed by: INTERNAL MEDICINE

## 2018-02-15 PROCEDURE — 71045 X-RAY EXAM CHEST 1 VIEW: CPT | Performed by: THORACIC SURGERY (CARDIOTHORACIC VASCULAR SURGERY)

## 2018-02-15 RX ORDER — AMIODARONE HYDROCHLORIDE 200 MG/1
400 TABLET ORAL 3 TIMES DAILY
Status: DISCONTINUED | OUTPATIENT
Start: 2018-02-15 | End: 2018-02-17

## 2018-02-15 RX ORDER — AMIODARONE HYDROCHLORIDE 200 MG/1
400 TABLET ORAL EVERY 8 HOURS
Status: DISCONTINUED | OUTPATIENT
Start: 2018-02-15 | End: 2018-02-15 | Stop reason: SDUPTHER

## 2018-02-15 RX ORDER — AMIODARONE HYDROCHLORIDE 200 MG/1
400 TABLET ORAL DAILY
Status: DISCONTINUED | OUTPATIENT
Start: 2018-02-16 | End: 2018-02-15

## 2018-02-15 NOTE — PROGRESS NOTES
[facility]  Progress Note    Toño [place] Patient Status:  Inpatient    1940 MRN [id number]   [facility] [place] 6NE-A Attending Amanda Bansal MD   [facility] Day # 2 PCP Walker Luu MD     Subjective:  Toño [place] is a(n 69 ye Chronic obstructive pulmonary disease (HCC)     Diabetes mellitus type 2, controlled, with complications ([address]     Essential hypertension     Pure hypercholesterolemia     SEBLE on CPAP     S/P AVR (aortic valve replacement)     Perennial non-allergic rhinit role at times-plan to continue nebulized bronchodilators and inhaled corticosteroids     7. Hx AVR 2015 and s/p correction of AI at [facility] 7/17.  EF  60-65% . PUL HTN with rvsp 53mmHg      8 .  SEBLE currently untreated with no desat on recent  overnight oximet

## 2018-02-15 NOTE — PROGRESS NOTES
[facility]  Progress Note     Cathie Ramos Patient Status:  Inpatient    1940 MRN [id number]   [facility] 6NE-A Attending Elías Beck MD   Hosp Day # 2 PCP Rubens Dooley MD     Chief Complaint: Constrictive pericardi Imaging: Imaging data reviewed in Epic.     Medications:   • metoprolol tartrate  25 mg Oral 2x Daily(Beta Blocker)   • amiodarone HCl  400 mg Oral Q8H   • [START ON 2/16/2018] amiodarone HCl  400 mg Oral Daily   • ipratropium-albuterol  3 mL Nebulizati

## 2018-02-15 NOTE — PHYSICAL THERAPY NOTE
Attempted to see Pt this am, however, PA reports patient presenting with significant dyspnea at this time and not appropriate for therapy. Pt to have stat CXR. Will follow up later, as schedule permits.

## 2018-02-15 NOTE — PROGRESS NOTES
[facility]  CV Surgery Progress Note    Kenia Ludwig Patient Status:  Inpatient    1940 MRN [id number]   [facility] 6NE-A Attending Eugenio Warner MD   Hosp Day # 2 PCP Sofi Urbina MD     Subjective:  Pt reports feeling

## 2018-02-15 NOTE — PROGRESS NOTES
02/15/18 1008   Clinical Encounter Type   Visited With Patient   Yazidi Encounters   Yazidi Needs Prayer   The patient was seen by Bennie House. Received prayer and support.

## 2018-02-15 NOTE — PLAN OF CARE
Patient received A+Ox4 in chair. Patient states \"I get a little short of breath at times. \" Tachypneic, encouraged patient to take slow, deep breaths. Oxygen saturation mid-high 90s on room air.  Desaturated to low 80s with ambulation back to bed, but was

## 2018-02-15 NOTE — PHYSICAL THERAPY NOTE
PHYSICAL THERAPY TREATMENT NOTE - INPATIENT    Room Number: 6505/3443-N     Session:  1  Number of Visits to Meet Established Goals: 5    Presenting Problem: s/p re-do sternotomy, pericardectomy 2/13/18    Problem List  Active Problems:    Peripheral vasc ventral                herniorrhaphy with composix mesh  02/11/2005: OTHER SURGICAL HISTORY      Comment: surgery for abdominal aortic aneurysm  1/30/2015: OTHER SURGICAL HISTORY      Comment: aortic valve replacement  No date: OTHER SURGICAL HISTORY steps with a railing?: A Lot       AM-PAC Score:  Raw Score: 19   PT Approx Degree of Impairment Score: 41.77%   Standardized Score (AM-PAC Scale): 45.44   CMS Modifier (G-Code): CK    FUNCTIONAL ABILITY STATUS  Gait Assessment   Gait Assistance: Minimum a since evaluation but requires breaks to correct breathing. Pt instructed in pursed lip breathing to ease shortness of breath.  Pt would benefit from continued skilled inpatient PT to address the above impairments and return patient to prior level of functio

## 2018-02-15 NOTE — PROGRESS NOTES
Seen and examined. Reviewed with nursing staff at bedside. Doing well yet appeared to have increasing respiratory distress when CT clamped. Now on suction and doing better.     Afebrile  115/81   101 irregular    Lungs clear anteriorly  Ht irregular  abd os

## 2018-02-15 NOTE — PROGRESS NOTES
ENDOCRINOLOGY PROGRESS NOTE    Typed by Irina Hartley MD on 2/15/2018      S:  Pt sitting up in the chair. No complaints.        O: BP 99/75   Pulse 100   Temp 98 °F (36.7 °C) (Tympanic)   Resp 30   Ht 73\"   Wt 189 lb 13.1 oz (86.1 kg)   SpO2 10 - 53.0 % 29.3 (L)   Platelet Count      269.6 - 450.0 10(3)uL 138.0 (L)           Assessment and Plan:    1. Type 2 DM with neuropathy: controlled as the glucoses are stable.  HgA1c 6.3%      · Adjust diabetic regimen as follows:       Novolog correction of

## 2018-02-16 ENCOUNTER — APPOINTMENT (OUTPATIENT)
Dept: GENERAL RADIOLOGY | Facility: HOSPITAL | Age: 78
DRG: 270 | End: 2018-02-16
Attending: INTERNAL MEDICINE
Payer: MEDICARE

## 2018-02-16 ENCOUNTER — APPOINTMENT (OUTPATIENT)
Dept: GENERAL RADIOLOGY | Facility: HOSPITAL | Age: 78
DRG: 270 | End: 2018-02-16
Attending: THORACIC SURGERY (CARDIOTHORACIC VASCULAR SURGERY)
Payer: MEDICARE

## 2018-02-16 LAB
ATRIAL RATE: 115 BPM
ATRIAL RATE: 59 BPM
BASOPHILS # BLD AUTO: 0.03 X10(3) UL (ref 0–0.1)
BASOPHILS NFR BLD AUTO: 0.5 %
BUN BLD-MCNC: 22 MG/DL (ref 8–20)
CALCIUM BLD-MCNC: 8.6 MG/DL (ref 8.3–10.3)
CHLORIDE: 101 MMOL/L (ref 101–111)
CO2: 34 MMOL/L (ref 22–32)
CREAT BLD-MCNC: 0.93 MG/DL (ref 0.7–1.3)
EOSINOPHIL # BLD AUTO: 0.12 X10(3) UL (ref 0–0.3)
EOSINOPHIL NFR BLD AUTO: 1.9 %
ERYTHROCYTE [DISTWIDTH] IN BLOOD BY AUTOMATED COUNT: 16.1 % (ref 11.5–16)
GLUCOSE BLD-MCNC: 120 MG/DL (ref 70–99)
GLUCOSE BLD-MCNC: 132 MG/DL (ref 65–99)
GLUCOSE BLD-MCNC: 134 MG/DL (ref 65–99)
GLUCOSE BLD-MCNC: 206 MG/DL (ref 65–99)
GLUCOSE BLD-MCNC: 209 MG/DL (ref 65–99)
GLUCOSE BLD-MCNC: 219 MG/DL (ref 65–99)
GLUCOSE BLD-MCNC: 219 MG/DL (ref 65–99)
GLUCOSE BLD-MCNC: 243 MG/DL (ref 65–99)
HAV IGM SER QL: 2.4 MG/DL (ref 1.7–3)
HCT VFR BLD AUTO: 28.1 % (ref 37–53)
HGB BLD-MCNC: 8.6 G/DL (ref 13–17)
IMMATURE GRANULOCYTE COUNT: 0.07 X10(3) UL (ref 0–1)
IMMATURE GRANULOCYTE RATIO %: 1.1 %
LYMPHOCYTES # BLD AUTO: 0.59 X10(3) UL (ref 0.9–4)
LYMPHOCYTES NFR BLD AUTO: 9.5 %
MCH RBC QN AUTO: 26.5 PG (ref 27–33.2)
MCHC RBC AUTO-ENTMCNC: 30.6 G/DL (ref 31–37)
MCV RBC AUTO: 86.5 FL (ref 80–99)
MONOCYTES # BLD AUTO: 0.55 X10(3) UL (ref 0.1–1)
MONOCYTES NFR BLD AUTO: 8.8 %
NEUTROPHIL ABS PRELIM: 4.88 X10 (3) UL (ref 1.3–6.7)
NEUTROPHILS # BLD AUTO: 4.88 X10(3) UL (ref 1.3–6.7)
NEUTROPHILS NFR BLD AUTO: 78.2 %
P AXIS: 20 DEGREES
P-R INTERVAL: 210 MS
PLATELET # BLD AUTO: 132 10(3)UL (ref 150–450)
POTASSIUM SERPL-SCNC: 4.2 MMOL/L (ref 3.6–5.1)
Q-T INTERVAL: 352 MS
Q-T INTERVAL: 478 MS
QRS DURATION: 120 MS
QRS DURATION: 140 MS
QTC CALCULATION (BEZET): 458 MS
QTC CALCULATION (BEZET): 473 MS
R AXIS: -7 DEGREES
R AXIS: -9 DEGREES
RBC # BLD AUTO: 3.25 X10(6)UL (ref 3.8–5.8)
RED CELL DISTRIBUTION WIDTH-SD: 51.3 FL (ref 35.1–46.3)
SODIUM SERPL-SCNC: 139 MMOL/L (ref 136–144)
T AXIS: 8 DEGREES
T AXIS: 9 DEGREES
VENTRICULAR RATE: 102 BPM
VENTRICULAR RATE: 59 BPM
WBC # BLD AUTO: 6.2 X10(3) UL (ref 4–13)

## 2018-02-16 PROCEDURE — 99231 SBSQ HOSP IP/OBS SF/LOW 25: CPT | Performed by: HOSPITALIST

## 2018-02-16 PROCEDURE — 71045 X-RAY EXAM CHEST 1 VIEW: CPT | Performed by: THORACIC SURGERY (CARDIOTHORACIC VASCULAR SURGERY)

## 2018-02-16 PROCEDURE — 71045 X-RAY EXAM CHEST 1 VIEW: CPT | Performed by: INTERNAL MEDICINE

## 2018-02-16 RX ORDER — IPRATROPIUM BROMIDE AND ALBUTEROL SULFATE 2.5; .5 MG/3ML; MG/3ML
3 SOLUTION RESPIRATORY (INHALATION) EVERY 2 HOUR PRN
Status: DISCONTINUED | OUTPATIENT
Start: 2018-02-16 | End: 2018-02-20

## 2018-02-16 RX ORDER — IPRATROPIUM BROMIDE AND ALBUTEROL SULFATE 2.5; .5 MG/3ML; MG/3ML
3 SOLUTION RESPIRATORY (INHALATION)
Status: DISCONTINUED | OUTPATIENT
Start: 2018-02-16 | End: 2018-02-20

## 2018-02-16 RX ORDER — HYDROCODONE BITARTRATE AND ACETAMINOPHEN 5; 325 MG/1; MG/1
1-2 TABLET ORAL
Qty: 60 TABLET | Refills: 0 | Status: SHIPPED | OUTPATIENT
Start: 2018-02-16 | End: 2018-03-07 | Stop reason: ALTCHOICE

## 2018-02-16 RX ORDER — METHYLPREDNISOLONE SODIUM SUCCINATE 125 MG/2ML
60 INJECTION, POWDER, LYOPHILIZED, FOR SOLUTION INTRAMUSCULAR; INTRAVENOUS ONCE
Status: COMPLETED | OUTPATIENT
Start: 2018-02-16 | End: 2018-02-16

## 2018-02-16 NOTE — PLAN OF CARE
Took over pt care 0730. Pt sitting in chair alert and oriented with slight tachypnea but able to engage in conversation without increased wob.  CT tubes clamped per CV surgery around 8am. Shortly after clamping, pt became increasingly SOB with increased wor

## 2018-02-16 NOTE — OCCUPATIONAL THERAPY NOTE
OCCUPATIONAL THERAPY TREATMENT NOTE - INPATIENT     Room Number: 4518/2928-A  Session: 1   Number of Visits to Meet Established Goals: 5    Presenting Problem: sternotomy and pericardectomy    History related to current admission:   Pt is a 69 y.o.  Male ad Comment: [facility]  1994: ARTHROSCOPY OF JOINT UNLISTED Left      Comment: knee  1980: CHOLECYSTECTOMY  2006: HERNIA SURGERY      Comment: ventral hernia repair; complex ventral                herniorrhaphy with composix mesh  02/11/2005: OTHER SURGICAL HISTORY to Sit : Minimum assistance  Sit to Stand: Moderate assistance    Skilled Therapy Provided: Pt received supine for session. Pt able to complete grooming activity while supine. Multiple breaks needed d/t fatigue. Pt sat EOB.   Educated pt on methods to pe simplification techniques;IADL training;ADL training;Continued evaluation; Compensatory technique education;Equipment eval/education;Patient/Family training;Patient/Family education; Endurance training;UE strengthening/ROM; Functional transfer training  Rehab

## 2018-02-16 NOTE — PROGRESS NOTES
Seen and examined earlier today. Up in chair. Pursed lipped breathing yet oxygen saturation 92% on room air.         Afebrile  118/76     63    Lungs hyperinflated with slight expiratory \"squeak\" anteriorly -- slight dullness right base  Ht appeared re

## 2018-02-16 NOTE — PROGRESS NOTES
[facility]  Progress Note     Ed Patient Status:  Inpatient    1940 MRN [id number]   [facility] 6NE-A Attending Karl Womack MD   [facility] Day # 3 PCP Ligia Gutierrez MD     Chief Complaint: Constrictive pericardi Epic.    Medications:   • metoprolol tartrate  25 mg Oral 2x Daily(Beta Blocker)   • Insulin Aspart Pen  1-20 Units Subcutaneous TID CC and HS   • amiodarone HCl  400 mg Oral TID   • ipratropium-albuterol  3 mL Nebulization 4 times per day   • Heparin Sodi

## 2018-02-16 NOTE — PROGRESS NOTES
[facility]  Progress Note    Chang Torres Patient Status:  Inpatient    1940 MRN [id number]   [facility] 6NE-A Attending Karen Bowers MD   [facility] Day # 3 PCP Camilo Madrid MD       SUBJECTIVE:  No acute events overnigh Results  Component Value Date   WBC 6.2 02/16/2018   HGB 8.6 02/16/2018   HCT 28.1 02/16/2018   .0 02/16/2018   CREATSERUM 0.93 02/16/2018   BUN 22 02/16/2018    02/16/2018   K 4.2 02/16/2018    02/16/2018   CO2 34.0 02/16/2018    tab 500 mg 500 mg Oral Q6H PRN   furosemide (LASIX) injection 40 mg 40 mg Intravenous BID (Diuretic)   spironolactone (ALDACTONE) tab 25 mg 25 mg Oral BID (Diuretic)   phytonadione (AQUA-MEPHYTON) 10 mg in sodium chloride 0.9 % 50 mL IVPB   Continuous PRN 2mg BID at home. Since being hospitalized has been on Novolog sliding scale. Blood sugars now improved. -continue on NL sliding scale 1:20>140 at this time  -accuchecks QID    2. 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  Management per cardi

## 2018-02-16 NOTE — PROGRESS NOTES
[facility]  Progress Note    Chang Torres Patient Status:  Inpatient    1940 MRN [id number]   [facility] 6NE-A Attending Karen Bowers MD   Hosp Day # 3 PCP Camilo Madrid MD     Subjective:  Seen and examined by Dr. Joslyn Jackson. 7.9 oz (85.5 kg), SpO2 91 %.     Assessment/Plan: S/P Pericardectomy POD #3   - HD stable   - PAF to SR   - Hypoxia/COPD - improving, Pulmonary following   - Pain control/IS/ambulation      Gatito Schaeffer  2/16/2018  12:47 PM

## 2018-02-16 NOTE — PHYSICAL THERAPY NOTE
PHYSICAL THERAPY TREATMENT NOTE - INPATIENT    Room Number: 7251/0559-F     Session: 2   Number of Visits to Meet Established Goals: 5    Presenting Problem: s/p re-do sternotomy, pericardectomy 2/13/18    Problem List  Active Problems:    Peripheral vasc ventral                herniorrhaphy with composix mesh  02/11/2005: OTHER SURGICAL HISTORY      Comment: surgery for abdominal aortic aneurysm  1/30/2015: OTHER SURGICAL HISTORY      Comment: aortic valve replacement  No date: OTHER SURGICAL HISTORY railing?: A Lot       AM-PAC Score:  Raw Score: 19   PT Approx Degree of Impairment Score: 41.77%   Standardized Score (AM-PAC Scale): 45.44   CMS Modifier (G-Code): CK    FUNCTIONAL ABILITY STATUS  Gait Assessment   Gait Assistance: Minimum assistance  Gary Fry RECOMMENDATIONS  PT Discharge Recommendations: Sub-acute rehabilitation     PLAN  PT Treatment Plan: Bed mobility; Endurance; Energy conservation;Patient education;Gait training;Strengthening;Stair training;Transfer training;Balance training  Rehab Potential

## 2018-02-16 NOTE — PROGRESS NOTES
[facility]  Progress Note    Michelle Luis Patient Status:  Inpatient    1940 MRN [id number]   [facility] 6NE-A Attending Karl Womack MD   [facility] Day # 3 PCP Ligia Gutierrez MD     Subjective:  Michelle Luis is a(n) 69 ye MCV  87.3  86.9  86.5   MCH  26.3*  26.4*  26.5*   MCHC  30.2*  30.4*  30.6*   RDW  15.8  16.2*  16.1*   NEPRELIM  12.27*  6.90*  4.88   WBC  13.1*  8.0  6.2   PLT  142.0*  138.0*  132.0*     Recent Labs   Lab  02/14/18   0324  02/15/18   0425  02/16/18 unsuccessful  ongoing diuresis as tolerated    Thee Hernandez SR.  2/16/2018  6:52 AM

## 2018-02-16 NOTE — PLAN OF CARE
Assumed care of pt approx 1910, s/p pericardectomy POD#2. Medicated with tylenol for c/o mild incisional pain, with relief. CT to -20 draining small amount serosang drainage. Vitals stable, afib on monitor.   Plan of care reviewed and all questions addre

## 2018-02-17 ENCOUNTER — APPOINTMENT (OUTPATIENT)
Dept: GENERAL RADIOLOGY | Facility: HOSPITAL | Age: 78
DRG: 270 | End: 2018-02-17
Attending: INTERNAL MEDICINE
Payer: MEDICARE

## 2018-02-17 LAB
BLOOD TYPE BARCODE: 5100
BUN BLD-MCNC: 25 MG/DL (ref 8–20)
CALCIUM BLD-MCNC: 9 MG/DL (ref 8.3–10.3)
CHLORIDE: 97 MMOL/L (ref 101–111)
CO2: 35 MMOL/L (ref 22–32)
CREAT BLD-MCNC: 0.91 MG/DL (ref 0.7–1.3)
GLUCOSE BLD-MCNC: 115 MG/DL (ref 65–99)
GLUCOSE BLD-MCNC: 147 MG/DL (ref 65–99)
GLUCOSE BLD-MCNC: 157 MG/DL (ref 70–99)
GLUCOSE BLD-MCNC: 161 MG/DL (ref 65–99)
GLUCOSE BLD-MCNC: 197 MG/DL (ref 65–99)
HAV IGM SER QL: 2.5 MG/DL (ref 1.7–3)
POTASSIUM SERPL-SCNC: 4.6 MMOL/L (ref 3.6–5.1)
SODIUM SERPL-SCNC: 137 MMOL/L (ref 136–144)

## 2018-02-17 PROCEDURE — 99232 SBSQ HOSP IP/OBS MODERATE 35: CPT | Performed by: HOSPITALIST

## 2018-02-17 PROCEDURE — 71045 X-RAY EXAM CHEST 1 VIEW: CPT | Performed by: INTERNAL MEDICINE

## 2018-02-17 RX ORDER — AMIODARONE HYDROCHLORIDE 200 MG/1
400 TABLET ORAL DAILY
Status: DISCONTINUED | OUTPATIENT
Start: 2018-02-18 | End: 2018-02-17

## 2018-02-17 RX ORDER — AMIODARONE HYDROCHLORIDE 200 MG/1
400 TABLET ORAL 2 TIMES DAILY WITH MEALS
Status: DISCONTINUED | OUTPATIENT
Start: 2018-02-17 | End: 2018-02-18

## 2018-02-17 NOTE — PROGRESS NOTES
[facility]  Progress Note    Bradly Gandhi Patient Status:  Inpatient    1940 MRN [id number]   [facility] 6NE-A Attending Chang Parisi MD   [address] Day # 4 PCP Johanna Rey MD       SUBJECTIVE:  No acute events overnigh Lab Results  Component Value Date   CREATSERUM 0.91 02/17/2018   BUN 25 02/17/2018    02/17/2018   K 4.6 02/17/2018   CL 97 02/17/2018   CO2 35.0 02/17/2018    02/17/2018   CA 9.0 02/17/2018   MG 2.5 02/17/2018   PGLU 147 02/17/2018 2 tablet Oral Q4H PRN   morphINE sulfate (PF) 2 MG/ML injection 2 mg 2 mg Intravenous Q1H PRN   Or      morphINE sulfate (PF) 2 MG/ML injection 4 mg 4 mg Intravenous Q1H PRN   Or      morphINE sulfate (PF) 2 MG/ML injection 8 mg 8 mg Intravenous Q1H PRN contact me with any questions or concerns.       Nia Hobson MD  Endocrinology, Diabetes, and Metabolism  [facility]

## 2018-02-17 NOTE — PROGRESS NOTES
[facility] Cardiology Progress Note    Subjective:  Able to walk out into brandon. requring a little O2 after.      Objective:  /63 (BP Location: Left arm)   Pulse 53   Temp (!) 97.5 °F (36.4 °C) (Oral)   Resp 20   Ht 185.4 cm (6' 1\")   Wt 186 lb 15.2 oz (

## 2018-02-17 NOTE — PLAN OF CARE
Problem: Patient/Family Goals  Goal: Patient/Family Long Term Goal  Patient's Long Term Goal: Being able to go on vacation. Interventions:  - Follow-up as needed.   - See additional Care Plan goals for specific interventions    Outcome: Progressing    Go including cough, deep breathe, Incentive Spirometry  - Assess the need for suctioning and perform as needed  - Assess and instruct to report SOB or any respiratory difficulty  - Respiratory Therapy support as indicated  - Manage/alleviate anxiety  - Monito

## 2018-02-17 NOTE — PROGRESS NOTES
[facility]   [facility] Progress Note    Maurizio Ukrainian Patient Status:  Inpatient    1940 MRN [id number]   [facility] 8NE-A Attending Massiel Vance MD   Hosp Day # 4 PCP Charlie Melgar MD     Subjective:  STEVENSON and some at rest, s tab 4 tablet 4 tablet Oral Q15 Min PRN   Or      dextrose 50% injection 50 mL 50 mL Intravenous Q15 Min PRN   Or      glucose (DEX4) oral liquid 30 g 30 g Oral Q15 Min PRN   Or      Glucose-Vitamin C (DEX-4) 4-0.006 g chewable tab 8 tablet 8 tablet Oral Q1 (DULCOLAX) rectal suppository 10 mg 10 mg Rectal Daily PRN   ondansetron HCl (ZOFRAN) injection 4 mg 4 mg Intravenous Q6H PRN   famoTIDine (PEPCID) tab 20 mg 20 mg Oral BID   mupirocin (BACTROBAN) 2% nasal ointment OINT 1 Application 1 Application Nasal BI Hyponatremia     Urinary urgency     Need for vaccination     Laceration of left elbow     Chronic constrictive pericarditis     Hypokalemia     Anemia     Azotemia     Metabolic alkalosis     Hyperglycemia     HCAP (healthcare-associated pneumonia)     Ch

## 2018-02-17 NOTE — PROGRESS NOTES
[facility]  Progress Note     Olivia Linares Patient Status:  Inpatient    1940 MRN [id number]   [facility] 6NE-A Attending Isabela Abarca MD   [facility] Day # 4 PCP Gee Degroot MD     Chief Complaint: Constrictive pericardi (Diuretic)   • spironolactone  25 mg Oral BID (Diuretic)   • Fluticasone Furoate-Vilanterol  1 puff Inhalation Daily   • gabapentin  900 mg Oral BID   • aspirin  81 mg Oral Daily   • docusate sodium  100 mg Oral BID   • famoTIDine  20 mg Oral BID   • mupir

## 2018-02-17 NOTE — PROGRESS NOTES
[facility]  Progress Note    Bradly Gandhi Patient Status:  Inpatient    1940 MRN [id number]   [facility] 6NE-A Attending Chang Parisi MD   [facility] Day # 4 PCP Johanna Rey MD     Subjective:  Bradly Gandhi is a(n) 25 ye PLT  138.0*  132.0*     Recent Labs   Lab  02/15/18   0425  02/16/18   0428  02/17/18   0447   GLU  127*  120*  157*   BUN  21*  22*  25*   CREATSERUM  1.06  0.93  0.91   GFRAA  78  91  94   GFRNAA  67  79  81   CA  8.8  8.6  9.0   NA  139  139  137   K

## 2018-02-18 ENCOUNTER — PRIOR ORIGINAL RECORDS (OUTPATIENT)
Dept: OTHER | Age: 78
End: 2018-02-18

## 2018-02-18 LAB
ATRIAL RATE: 47 BPM
BUN BLD-MCNC: 33 MG/DL (ref 8–20)
CALCIUM BLD-MCNC: 9.1 MG/DL (ref 8.3–10.3)
CHLORIDE: 96 MMOL/L (ref 101–111)
CO2: 34 MMOL/L (ref 22–32)
CREAT BLD-MCNC: 1.16 MG/DL (ref 0.7–1.3)
GLUCOSE BLD-MCNC: 123 MG/DL (ref 70–99)
GLUCOSE BLD-MCNC: 130 MG/DL (ref 65–99)
GLUCOSE BLD-MCNC: 140 MG/DL (ref 65–99)
GLUCOSE BLD-MCNC: 241 MG/DL (ref 65–99)
GLUCOSE BLD-MCNC: 248 MG/DL (ref 65–99)
HAV IGM SER QL: 2.4 MG/DL (ref 1.7–3)
P AXIS: -7 DEGREES
P-R INTERVAL: 248 MS
POTASSIUM SERPL-SCNC: 3.8 MMOL/L (ref 3.6–5.1)
Q-T INTERVAL: 544 MS
QRS DURATION: 154 MS
QTC CALCULATION (BEZET): 481 MS
R AXIS: -7 DEGREES
SODIUM SERPL-SCNC: 137 MMOL/L (ref 136–144)
T AXIS: 14 DEGREES
VENTRICULAR RATE: 47 BPM

## 2018-02-18 PROCEDURE — 99231 SBSQ HOSP IP/OBS SF/LOW 25: CPT | Performed by: HOSPITALIST

## 2018-02-18 RX ORDER — AMIODARONE HYDROCHLORIDE 200 MG/1
400 TABLET ORAL DAILY
Status: DISCONTINUED | OUTPATIENT
Start: 2018-02-19 | End: 2018-02-20

## 2018-02-18 RX ORDER — POTASSIUM CHLORIDE 20 MEQ/1
40 TABLET, EXTENDED RELEASE ORAL ONCE
Status: COMPLETED | OUTPATIENT
Start: 2018-02-18 | End: 2018-02-18

## 2018-02-18 NOTE — PLAN OF CARE
Patient is alert and oriented. On 2L NC as needed. Patient is to wear oxygen for saturation less than 92%. Patient does need 2L for ambulation. Needs a lot of encouragement for ambulation and IS. SB on the tele. Patient ambulates with SBA and a walker.  POC

## 2018-02-18 NOTE — PROGRESS NOTES
[facility]  Progress Note    Nguyen Courser Patient Status:  Inpatient    1940 MRN [id number]   [facility] 6NE-A Attending Kade Mederos MD   [facility] Day # 5 PCP Gabi Rice MD       SUBJECTIVE:  No acute events overnigh Lab Results  Component Value Date   CREATSERUM 1.16 02/18/2018   BUN 33 02/18/2018    02/18/2018   K 3.8 02/18/2018   CL 96 02/18/2018   CO2 34.0 02/18/2018    02/18/2018   CA 9.1 02/18/2018   MG 2.4 02/18/2018   PGLU 197 02/17/2018 Q4H PRN   Or      HYDROcodone-acetaminophen (NORCO)  MG per tab 2 tablet 2 tablet Oral Q4H PRN   morphINE sulfate (PF) 2 MG/ML injection 2 mg 2 mg Intravenous Q1H PRN   Or      morphINE sulfate (PF) 2 MG/ML injection 4 mg 4 mg Intravenous Q1H PRN   O with any questions or concerns.       Ciro Amaya MD  Endocrinology, Diabetes, and Metabolism  [facility]

## 2018-02-18 NOTE — PROGRESS NOTES
[facility] Cardiology Progress Note    Subjective:  Currently sleeping soundly. Reviewed with nursing. Able to take short walk today, needed O2 during walk.      Objective:  /68 (BP Location: Left arm)   Pulse (!) 48   Temp 97.6 °F (36.4 °C) (Oral)   Re Nurse  Jose Ray / JAROCHO  02/18/2018 Sunday

## 2018-02-18 NOTE — PHYSICAL THERAPY NOTE
PHYSICAL THERAPY TREATMENT NOTE - INPATIENT    Room Number: 2807/1195-X     Session: 1   Number of Visits to Meet Established Goals: 5    Presenting Problem: s/p re-do sternotomy, pericardectomy 2/13/18    Problem List  Active Problems:    Peripheral vasc ventral                herniorrhaphy with composix mesh  02/11/2005: OTHER SURGICAL HISTORY      Comment: surgery for abdominal aortic aneurysm  1/30/2015: OTHER SURGICAL HISTORY      Comment: aortic valve replacement  No date: OTHER SURGICAL HISTORY Need to walk in hospital room?: A Little   -   Climbing 3-5 steps with a railing?: A Lot       AM-PAC Score:  Raw Score: 19   PT Approx Degree of Impairment Score: 41.77%   Standardized Score (AM-PAC Scale): 45.44   CMS Modifier (G-Code): CK    FUNCTIONAL bed;Needs met;Call light within reach;RN aware of session/findings; All patient questions and concerns addressed; Family present    ASSESSMENT   Pt demonstrated progress in gait distance this session. Pt continues to desaturate during gait and exercises.  Pt

## 2018-02-18 NOTE — PROGRESS NOTES
[facility]  Progress Note    Peggy Galvan Patient Status:  Inpatient    1940 MRN [id number]   [facility] 6NE-A Attending Godwin Pan MD   [facility] Day # 5 PCP Carter Naik MD     Subjective:  Peggy Galvan is a(n) 53 ye 3.25*   HGB  8.6*   HCT  28.1*   MCV  86.5   MCH  26.5*   MCHC  30.6*   RDW  16.1*   NEPRELIM  4.88   WBC  6.2   PLT  132.0*     Recent Labs   Lab  02/16/18   0428  02/17/18   0447  02/18/18   0511   GLU  120*  157*  123*   BUN  22*  25*  33*   CREATSERUM

## 2018-02-18 NOTE — PROGRESS NOTES
[facility]  Progress Note     Kenia Ludwig Patient Status:  Inpatient    1940 MRN [id number]   [facility] 6NE-A Attending Eugenio Warner MD   [facility] Day # 5 PCP Sofi Urbina MD     Chief Complaint: Constrictive pericardi times per day   • furosemide  40 mg Intravenous BID (Diuretic)   • spironolactone  25 mg Oral BID (Diuretic)   • Fluticasone Furoate-Vilanterol  1 puff Inhalation Daily   • gabapentin  900 mg Oral BID   • aspirin  81 mg Oral Daily   • docusate sodium  100

## 2018-02-19 ENCOUNTER — APPOINTMENT (OUTPATIENT)
Dept: GENERAL RADIOLOGY | Facility: HOSPITAL | Age: 78
DRG: 270 | End: 2018-02-19
Attending: HOSPITALIST
Payer: MEDICARE

## 2018-02-19 LAB
ATRIAL RATE: 53 BPM
BUN BLD-MCNC: 30 MG/DL (ref 8–20)
CALCIUM BLD-MCNC: 9.3 MG/DL (ref 8.3–10.3)
CHLORIDE: 95 MMOL/L (ref 101–111)
CO2: 36 MMOL/L (ref 22–32)
CREAT BLD-MCNC: 1.06 MG/DL (ref 0.7–1.3)
GLUCOSE BLD-MCNC: 164 MG/DL (ref 65–99)
GLUCOSE BLD-MCNC: 166 MG/DL (ref 70–99)
GLUCOSE BLD-MCNC: 179 MG/DL (ref 65–99)
GLUCOSE BLD-MCNC: 192 MG/DL (ref 65–99)
GLUCOSE BLD-MCNC: 205 MG/DL (ref 65–99)
P AXIS: 37 DEGREES
P-R INTERVAL: 210 MS
POTASSIUM SERPL-SCNC: 4.5 MMOL/L (ref 3.6–5.1)
Q-T INTERVAL: 528 MS
QRS DURATION: 136 MS
QTC CALCULATION (BEZET): 495 MS
R AXIS: 2 DEGREES
SODIUM SERPL-SCNC: 136 MMOL/L (ref 136–144)
T AXIS: 21 DEGREES
VENTRICULAR RATE: 53 BPM

## 2018-02-19 PROCEDURE — 99232 SBSQ HOSP IP/OBS MODERATE 35: CPT | Performed by: HOSPITALIST

## 2018-02-19 PROCEDURE — 71046 X-RAY EXAM CHEST 2 VIEWS: CPT | Performed by: HOSPITALIST

## 2018-02-19 NOTE — PROGRESS NOTES
[facility]  Progress Note    Kathy Johnson Patient Status:  Inpatient    1940 MRN [id number]   [facility] 8NE-A Attending David Salomon MD   [facility] Day # 6 PCP Myrna Pate MD     ASSESSMENT:     · Status post redo ster Dyspnea     Dyspnea, unspecified type     Acute chest pain     CHF (congestive heart failure), NYHA class II (HCC)     Chronic combined systolic and diastolic congestive heart failure (HCC)     Heart failure with preserved ejection fraction, borderline, cl alert, oriented, no apparent distress  Heart: Regular rate and rhythm, normal S1S2  Lungs:  cracles .  Crepitus anteriorly ioj right  , no wheezign   Abdomen: soft, nontender, no guarding, no rebound, positive BS  Extremity: no lower extremity edema, no cya injection 5,000 Units 5,000 Units Subcutaneous 2 times per day   acetaminophen (TYLENOL EXTRA STRENGTH) tab 500 mg 500 mg Oral Q6H PRN   furosemide (LASIX) injection 40 mg 40 mg Intravenous BID (Diuretic)   spironolactone (ALDACTONE) tab 25 mg 25 mg Oral B

## 2018-02-19 NOTE — PROGRESS NOTES
Met with patient and wife to discuss post op expectations, discharge planning. PT recs JAGRUTI, pt has been to [facility] before, most recently in July. They agree to referral to [facility]. D/w Ruy Miguel.     Seen and examined by Dr. Bonny De Luna, POD #6 s/p rogelio

## 2018-02-19 NOTE — PROGRESS NOTES
[facility]  Progress Note    Kenia Ludwig Patient Status:  Inpatient    1940 MRN [id number]   [facility] 8NE-A Attending Eugenio Warner MD   [facility] Day # 6 PCP Sofi Urbina MD     Subjective:  Pt continues to feel better ea 93 %.  General: A&O X 3, VSS, NAD, afeb  Neck: no JVD  Lungs: CTAB, diminished @ bases  Heart: RRR  Abdomen: soft  Extremities: no appreciable edema    Assessment/Plan: S/P Pericardectomy POD 6   - HD stable   - Hypoxia - inhalers, IS, diuresing - Pulm fol

## 2018-02-19 NOTE — PLAN OF CARE
Problem: CARDIOVASCULAR - ADULT  Goal: Maintains optimal cardiac output and hemodynamic stability  INTERVENTIONS:  - Monitor vital signs, rhythm, and trends  - Monitor for bleeding, hypotension and signs of decreased cardiac output  - Evaluate effectivenes for mobility and gait  - Educate and engage patient/family in tolerated activity level and precautions  - Recommend use of  RW for transfers and ambulation   Outcome: Progressing      Problem: Impaired Activities of Daily Living  Goal: Achieve highest/safe

## 2018-02-19 NOTE — PROGRESS NOTES
[facility]  Cardiology Progress Note    Cathie Ramos Patient Status:  Inpatient    1940 MRN [id number]   [facility] 8NE-A Attending Elías Beck MD   [facility] Day # 6 PCP Rubens Dooley MD     Subjective:  Sitting up in chair mg Oral BID   • aspirin  81 mg Oral Daily   • docusate sodium  100 mg Oral BID   • famoTIDine  20 mg Oral BID     • phytonadione (VITAMIN K) IVPB     • DOBUTamine     • Nitroglycerin in D5W     • norepinephrine     • nitroprusside (NIPRIDE) 50 mg in D5W in

## 2018-02-19 NOTE — PHYSICAL THERAPY NOTE
Pt refuses PT at this time due to fatigue. Reports he just ambulated with nursing staff and needs time to recover. Will check back as schedule allows. RN aware.

## 2018-02-19 NOTE — PROGRESS NOTES
[facility]  Progress Note     Lilian Hernandez Patient Status:  Inpatient    1940 MRN [id number]   [facility] 6NE-A Attending [place], MD   [address] Day # 6 PCP Ubaldo Lino MD     Chief Complaint: Constrictive pericardi and HS   • Heparin Sodium (Porcine)  5,000 Units Subcutaneous 2 times per day   • furosemide  40 mg Intravenous BID (Diuretic)   • spironolactone  25 mg Oral BID (Diuretic)   • Fluticasone Furoate-Vilanterol  1 puff Inhalation Daily   • gabapentin  900 mg

## 2018-02-19 NOTE — PROGRESS NOTES
[facility]  Progress Note    Paddy Louis Patient Status:  Inpatient    1940 MRN [id number]   [facility] 6NE-A Attending Mart Chavez MD   [facility] Day # 6 PCP Gerson Ledesma MD       SUBJECTIVE:  No acute events overnigh affect  Neurologic: no tremors     Data Review:   Labs:     Lab Results  Component Value Date   CREATSERUM 1.06 02/19/2018   BUN 30 02/19/2018    02/19/2018   K 4.5 02/19/2018   CL 95 02/19/2018   CO2 36.0 02/19/2018    02/19/2018   CA 9.3 02/ HYDROcodone-acetaminophen (NORCO)  MG per tab 2 tablet 2 tablet Oral Q4H PRN   morphINE sulfate (PF) 2 MG/ML injection 2 mg 2 mg Intravenous Q1H PRN   Or      morphINE sulfate (PF) 2 MG/ML injection 4 mg 4 mg Intravenous Q1H PRN   Or      morphINE questions or concerns.       Kiara Bullard MD  Endocrinology, Diabetes, and Metabolism  [facility]

## 2018-02-20 ENCOUNTER — APPOINTMENT (OUTPATIENT)
Dept: GENERAL RADIOLOGY | Facility: HOSPITAL | Age: 78
DRG: 270 | End: 2018-02-20
Attending: INTERNAL MEDICINE
Payer: MEDICARE

## 2018-02-20 VITALS
RESPIRATION RATE: 18 BRPM | TEMPERATURE: 98 F | WEIGHT: 183.44 LBS | SYSTOLIC BLOOD PRESSURE: 113 MMHG | OXYGEN SATURATION: 100 % | HEIGHT: 73 IN | BODY MASS INDEX: 24.31 KG/M2 | DIASTOLIC BLOOD PRESSURE: 52 MMHG | HEART RATE: 49 BPM

## 2018-02-20 LAB
BASOPHILS # BLD AUTO: 0.01 X10(3) UL (ref 0–0.1)
BASOPHILS NFR BLD AUTO: 0.1 %
BUN BLD-MCNC: 37 MG/DL (ref 8–20)
CALCIUM BLD-MCNC: 9.7 MG/DL (ref 8.3–10.3)
CHLORIDE: 95 MMOL/L (ref 101–111)
CO2: 36 MMOL/L (ref 22–32)
CREAT BLD-MCNC: 1.24 MG/DL (ref 0.7–1.3)
EOSINOPHIL # BLD AUTO: 0.02 X10(3) UL (ref 0–0.3)
EOSINOPHIL NFR BLD AUTO: 0.2 %
ERYTHROCYTE [DISTWIDTH] IN BLOOD BY AUTOMATED COUNT: 17.1 % (ref 11.5–16)
GLUCOSE BLD-MCNC: 119 MG/DL (ref 65–99)
GLUCOSE BLD-MCNC: 127 MG/DL (ref 70–99)
GLUCOSE BLD-MCNC: 143 MG/DL (ref 65–99)
HCT VFR BLD AUTO: 31.1 % (ref 37–53)
HGB BLD-MCNC: 9.4 G/DL (ref 13–17)
IMMATURE GRANULOCYTE COUNT: 0.08 X10(3) UL (ref 0–1)
IMMATURE GRANULOCYTE RATIO %: 0.8 %
LYMPHOCYTES # BLD AUTO: 0.57 X10(3) UL (ref 0.9–4)
LYMPHOCYTES NFR BLD AUTO: 5.5 %
MCH RBC QN AUTO: 26.3 PG (ref 27–33.2)
MCHC RBC AUTO-ENTMCNC: 30.2 G/DL (ref 31–37)
MCV RBC AUTO: 86.9 FL (ref 80–99)
MONOCYTES # BLD AUTO: 0.75 X10(3) UL (ref 0.1–1)
MONOCYTES NFR BLD AUTO: 7.3 %
NEUTROPHIL ABS PRELIM: 8.9 X10 (3) UL (ref 1.3–6.7)
NEUTROPHILS # BLD AUTO: 8.9 X10(3) UL (ref 1.3–6.7)
NEUTROPHILS NFR BLD AUTO: 86.1 %
PLATELET # BLD AUTO: 242 10(3)UL (ref 150–450)
POTASSIUM SERPL-SCNC: 4.7 MMOL/L (ref 3.6–5.1)
RBC # BLD AUTO: 3.58 X10(6)UL (ref 3.8–5.8)
RED CELL DISTRIBUTION WIDTH-SD: 51.7 FL (ref 35.1–46.3)
SODIUM SERPL-SCNC: 137 MMOL/L (ref 136–144)
WBC # BLD AUTO: 10.3 X10(3) UL (ref 4–13)

## 2018-02-20 PROCEDURE — 99231 SBSQ HOSP IP/OBS SF/LOW 25: CPT | Performed by: HOSPITALIST

## 2018-02-20 PROCEDURE — 71045 X-RAY EXAM CHEST 1 VIEW: CPT | Performed by: INTERNAL MEDICINE

## 2018-02-20 RX ORDER — FUROSEMIDE 10 MG/ML
40 INJECTION INTRAMUSCULAR; INTRAVENOUS
Status: DISCONTINUED | OUTPATIENT
Start: 2018-02-20 | End: 2018-02-20

## 2018-02-20 RX ORDER — TORSEMIDE 20 MG/1
40 TABLET ORAL DAILY
Qty: 180 TABLET | Refills: 0 | Status: SHIPPED | OUTPATIENT
Start: 2018-02-20 | End: 2018-02-20

## 2018-02-20 RX ORDER — AMIODARONE HYDROCHLORIDE 200 MG/1
200 TABLET ORAL DAILY
Qty: 30 TABLET | Refills: 5 | Status: SHIPPED | OUTPATIENT
Start: 2018-02-21 | End: 2019-04-18

## 2018-02-20 RX ORDER — SPIRONOLACTONE 25 MG/1
25 TABLET ORAL
Status: DISCONTINUED | OUTPATIENT
Start: 2018-02-20 | End: 2018-02-20

## 2018-02-20 RX ORDER — FUROSEMIDE 10 MG/ML
40 INJECTION INTRAMUSCULAR; INTRAVENOUS DAILY
Status: DISCONTINUED | OUTPATIENT
Start: 2018-02-20 | End: 2018-02-20

## 2018-02-20 RX ORDER — AMIODARONE HYDROCHLORIDE 200 MG/1
200 TABLET ORAL DAILY
Status: DISCONTINUED | OUTPATIENT
Start: 2018-02-20 | End: 2018-02-20

## 2018-02-20 RX ORDER — TORSEMIDE 20 MG/1
40 TABLET ORAL DAILY
Qty: 180 TABLET | Refills: 0 | Status: SHIPPED | OUTPATIENT
Start: 2018-02-20 | End: 2018-07-10

## 2018-02-20 RX ORDER — SPIRONOLACTONE 25 MG/1
25 TABLET ORAL DAILY
Status: DISCONTINUED | OUTPATIENT
Start: 2018-02-20 | End: 2018-02-20

## 2018-02-20 RX ORDER — METOPROLOL SUCCINATE 25 MG/1
25 TABLET, EXTENDED RELEASE ORAL DAILY
Qty: 30 TABLET | Refills: 3 | Status: SHIPPED | OUTPATIENT
Start: 2018-02-20 | End: 2018-03-22

## 2018-02-20 RX ORDER — SPIRONOLACTONE 25 MG/1
25 TABLET ORAL DAILY
Qty: 30 TABLET | Refills: 3 | Status: SHIPPED | OUTPATIENT
Start: 2018-02-20

## 2018-02-20 NOTE — CONSULTS
[address] Patient Status:  Inpatient    1940 MRN [id number]   [facility] 8NE-A Attending Massiel Vance MD   [facility] Day # 6 PCP Charlie Melgar MD     Patient Identification  Maurizio Dinero is a 69year old assess pt's funtional status and make appropriate recommendations. Premorbid Functional Status: Patient was independent with mobility/ambulation, transfers, ADL's, IADL's.   Support System and Family Circumstances (i.e., potential caregivers): spouse / s HISTORY      Comment: aortic valve replacement  No date: OTHER SURGICAL HISTORY      Comment: On 2/13/2018: re-do sternotomy and                pericardectomy  4/1/2016: REVISE MEDIAN N/CARPAL TUNNEL SURG Left      Comment: Procedure: CARPAL TUNNEL RELEASE spironolactone (ALDACTONE) tab 25 mg 25 mg Oral BID (Diuretic)   [COMPLETED] mupirocin (BACTROBAN) 2% nasal ointment OINT      phytonadione (AQUA-MEPHYTON) 10 mg in sodium chloride 0.9 % 50 mL IVPB   Continuous PRN   Fluticasone Furoate-Vilanterol (BREO mg Oral BID   [COMPLETED] Vancomycin HCl (VANCOCIN) 1,250 mg in sodium chloride 0.9 % 250 mL IVPB 15 mg/kg Intravenous Q12H   [COMPLETED] mupirocin (BACTROBAN) 2% nasal ointment OINT 1 Application 1 Application Nasal BID   [COMPLETED] ceFAZolin sodium (ANC stated age. Head:  Normocephalic, without obvious abnormality, atraumatic. Eyes:  Conjunctivae/lids clear. PERRL, EOMs intact. Vision functional.   Ears/Nose/Throat: Hearing intact.  Lips, mucosa, and tongue normal. Teeth and gums normal. Moist mucous me contractures and stiffness with consequent functional impairments.     Risk for thromboembolic disease with need for careful DVT prophylaxis, potential for bleeding and hematoma or hemarthrosis (and aggravating anemia) with anticoagulation    PLAN:

## 2018-02-20 NOTE — PROGRESS NOTES
POD #8 s/p pericardectomy  D/w Dr. Bea Morgan, ok to d/c to [facility] today. F/u as scheduled.    Met with patient and wife to discuss discharge instructions, activity restrictions and recommendations, follow up visits, all questions answered, encouraged to call

## 2018-02-20 NOTE — PROGRESS NOTES
[facility]  Progress Note    Jonas Orourke Patient Status:  Inpatient    1940 MRN [id number]   [facility] 8NE-A Attending Gwenette Curling, MD   [address] Day # 7 PCP Kayla Brandt MD        ASSESSMENT:     · Status post redo st insufficiency     Diabetic polyneuropathy associated with type 2 diabetes mellitus (HCC)     Sensory hearing loss, bilateral     Mild pulmonary hypertension (HCC)     Dyspnea     Dyspnea, unspecified type     Acute chest pain     CHF (congestive heart fail kg)  02/02/18 : 188 lb 0.8 oz (85.3 kg)  12/18/17 : 196 lb (88.9 kg)  12/12/17 : 190 lb 6.4 oz (86.4 kg)  11/16/17 : 191 lb (86.6 kg)      Physical Exam:  General: alert, oriented, no apparent distress  Heart: Regular rate and rhythm, normal S1S2  Lungs: 50% injection 50 mL 50 mL Intravenous Q15 Min PRN   Or      glucose (DEX4) oral liquid 30 g 30 g Oral Q15 Min PRN   Or      Glucose-Vitamin C (DEX-4) 4-0.006 g chewable tab 8 tablet 8 tablet Oral Q15 Min PRN   Heparin Sodium (Porcine) 5000 UNIT/ML injectio from Last 1 Encounters:  No data found for FATUMA [place]  2/20/2018  9:37 AM

## 2018-02-20 NOTE — PROGRESS NOTES
Seen and examined. Up in chair. Overall doing well. Oxygen requirements have decreased.     Afebrile  Sinus bradycardia -- 45-55          132/66    Lungs mild crackles left base - overall better air movement than pre-op  Ht RR - bradycardic  abd soft - feel

## 2018-02-20 NOTE — PLAN OF CARE
Patient is alert and oriented. On 2L NC, 4L when ambulating. SB on the tele. Ambulates with assist and walker. Patient will be going to Rehab this afternoon. POC updated with patient, will continue to monitor.  Call light within reach, will continue to [facility]

## 2018-02-20 NOTE — CM/SW NOTE
02/20/18 1700   Discharge disposition   Discharged to: [address] [place] [address]   Name of Facillity/Home Care/Hospice [facility]   Discharge transportation [employer]

## 2018-02-20 NOTE — OCCUPATIONAL THERAPY NOTE
IP OT attempt to see patient for therapeutic treatment.   CARISA Flowers) states that patient was just [address] from [place], [place], OTR/L  Master of Occupational Therapy  Occupational Therapist, Registered/Licensed

## 2018-02-20 NOTE — CM/SW NOTE
Call received back from [place] [phone]--patient assigned to room 1173. Nurse to call report and send d/c paperwork [phone].   To discuss transportation options with patient--facility requesting that patient leave at 1330

## 2018-02-20 NOTE — PROGRESS NOTES
[facility]  Progress Note     Javed Peterson Patient Status:  Inpatient    1940 MRN [id number]   [facility] 6NE-A Attending Easton De Los Santos MD   [facility] Day # 7 PCP Yessica Costa MD     Chief Complaint: Constrictive pericardi Fluticasone Furoate-Vilanterol  1 puff Inhalation Daily   • gabapentin  900 mg Oral BID   • aspirin  81 mg Oral Daily   • docusate sodium  100 mg Oral BID   • famoTIDine  20 mg Oral BID       ASSESSMENT / PLAN:     1.  Constrictive pericarditis sp pericarde

## 2018-02-20 NOTE — PROGRESS NOTES
[facility]  Progress Note    Estrada Grande Patient Status:  Inpatient    1940 MRN [id number]   [facility] 6NE-A Attending Ella Corbin MD   [facility] Day # 7 PCP Crissy Hopkins MD       SUBJECTIVE:  No acute events overnigh Labs:     Lab Results  Component Value Date   WBC 10.3 02/20/2018   HGB 9.4 02/20/2018   HCT 31.1 02/20/2018   .0 02/20/2018   CREATSERUM 1.24 02/20/2018   BUN 37 02/20/2018    02/20/2018   K 4.7 02/20/2018   CL 95 02/20/2018   CO2 36.0 02/2 Q4H PRN   Or      HYDROcodone-acetaminophen (NORCO)  MG per tab 2 tablet 2 tablet Oral Q4H PRN   morphINE sulfate (PF) 2 MG/ML injection 2 mg 2 mg Intravenous Q1H PRN   Or      morphINE sulfate (PF) 2 MG/ML injection 4 mg 4 mg Intravenous Q1H PRN   O patient is in the hospital.  Please feel free to contact me with any questions or concerns.       Gilberto Colbert MD  Endocrinology, Diabetes, and Metabolism  [facility]

## 2018-02-26 ENCOUNTER — PATIENT OUTREACH (OUTPATIENT)
Dept: CASE MANAGEMENT | Age: 78
End: 2018-02-26

## 2018-02-26 NOTE — PROGRESS NOTES
Contacted pt to intro CCM and spoke with daughter Osorio Pineda (on HIPAA) who stated pt is currently at [facility] and isn't sure when he will be discharged. Advised spouse I would try back in 2 or 3 weeks to check and verbalized understanding.

## 2018-02-27 LAB
BUN: 33 MG/DL
CALCIUM: 9.1 MG/DL
CHLORIDE: 96 MEQ/L
CREATININE, SERUM: 1.16 MG/DL
GLUCOSE: 123 MG/DL
POTASSIUM, SERUM: 3.8 MEQ/L
SODIUM: 137 MEQ/L

## 2018-03-06 DIAGNOSIS — E11.42 DIABETIC POLYNEUROPATHY ASSOCIATED WITH TYPE 2 DIABETES MELLITUS (HCC): ICD-10-CM

## 2018-03-06 RX ORDER — GABAPENTIN 300 MG/1
CAPSULE ORAL
Qty: 540 CAPSULE | Refills: 0 | Status: SHIPPED | OUTPATIENT
Start: 2018-03-06 | End: 2018-05-10 | Stop reason: DRUGHIGH

## 2018-03-07 PROBLEM — Z98.890: Status: ACTIVE | Noted: 2018-03-07

## 2018-03-07 PROBLEM — F43.21 SITUATIONAL DEPRESSION: Status: ACTIVE | Noted: 2018-03-07

## 2018-03-07 NOTE — PROGRESS NOTES
Zora Springer is a 69year old male. 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 He also had a stint at [facility] after his hospitalization.   He is presently re glimepiride 2 MG Oral Tab Take 1 tablet (2 mg total) by mouth 2 (two) times daily. Disp: 180 tablet Rfl: 0   aspirin 81 MG Oral Tab EC Take 1 tablet by mouth daily.  Disp: 30 tablet Rfl: 11   Metoprolol Succinate ER 25 MG Oral Tablet 24 Hr Take 1 tablet ( shortness of breath with exertion  CARDIOVASCULAR: denies chest pain on exertion  GI: denies abdominal pain and denies heartburn  NEURO: denies headaches    EXAM:   /60   Pulse 53   Temp (!) 97.5 °F (36.4 °C) (Oral)   Resp 18   Ht 73\"   Wt 186 lb

## 2018-03-09 NOTE — DISCHARGE SUMMARY
[facility]  Discharge Summary    Amie Perez Patient Status:  Inpatient    1940 MRN [id number]   [facility] 8NE-A Attending No att. providers found   Hosp Day # 7 PCP Iline Felty, MD     Admit date: 2018    Dischar

## 2018-03-12 ENCOUNTER — TELEPHONE (OUTPATIENT)
Dept: FAMILY MEDICINE CLINIC | Facility: CLINIC | Age: 78
End: 2018-03-12

## 2018-03-12 NOTE — TELEPHONE ENCOUNTER
Vijay Farias, Physical Therapist w/[facility] called requesting for a Verbal to move pt's Eval from last week to this week (actually, pt's Eval is tomorrow Tues, 3/13/18). Bambi's secured  @ [phone]. Thank you.

## 2018-03-12 NOTE — TELEPHONE ENCOUNTER
MARY Lacy at [facility]. Verbal order left on voice mail  for authorization to move pt.s fran for physical therapy to 03/13/18. Patient noted on the [facility] list. Patient noted on list for recent foot surgery and therapy with home care. Patient returned phone call. States that he has completed therapy, is no longer having home care and has returned to his normal work and activity level. He is very pleased with the results of the surgery and has no questions or concerns at this time. BMI is within normal limits, patient is a non-smoker. Takes medications for high blood pressure which is controlling his condition at this time. Nurse Navigator role and services explained and offered to patient. Declines to work with writer at this time. Plan: Introduction letter, Nurse Navigator brochure and contact information sent to patient for future reference. Patient encouraged to contact nurse navigator with any question or concerns.    Will close case at this time but would be happy to work with patient in future if needs arise.    Min DEEN, RN, [facility]  Nurse Navigator  Population Health - [facility]  [phone]

## 2018-03-13 PROCEDURE — 83876 ASSAY MYELOPEROXIDASE: CPT | Performed by: INTERNAL MEDICINE

## 2018-03-13 PROCEDURE — 86255 FLUORESCENT ANTIBODY SCREEN: CPT | Performed by: INTERNAL MEDICINE

## 2018-03-15 ENCOUNTER — PATIENT OUTREACH (OUTPATIENT)
Dept: CASE MANAGEMENT | Age: 78
End: 2018-03-15

## 2018-03-15 ENCOUNTER — TELEPHONE (OUTPATIENT)
Dept: FAMILY MEDICINE CLINIC | Facility: CLINIC | Age: 78
End: 2018-03-15

## 2018-03-15 NOTE — TELEPHONE ENCOUNTER
[place] from [facility] wants verbal ok to move OT eval to next week. Please advise. Thank you.

## 2018-03-19 ENCOUNTER — HOSPITAL ENCOUNTER (OUTPATIENT)
Dept: GENERAL RADIOLOGY | Facility: HOSPITAL | Age: 78
Discharge: HOME | End: 2018-03-19
Attending: INTERNAL MEDICINE
Payer: MEDICARE

## 2018-03-19 DIAGNOSIS — R06.02 SHORTNESS OF BREATH: ICD-10-CM

## 2018-03-19 DIAGNOSIS — I31.1 CHRONIC CONSTRICTIVE PERICARDITIS, UNSPECIFIED TYPE: ICD-10-CM

## 2018-03-19 DIAGNOSIS — J90 PLEURAL EFFUSION: ICD-10-CM

## 2018-03-19 PROCEDURE — 71046 X-RAY EXAM CHEST 2 VIEWS: CPT | Performed by: INTERNAL MEDICINE

## 2018-03-20 ENCOUNTER — TELEPHONE (OUTPATIENT)
Dept: FAMILY MEDICINE CLINIC | Facility: CLINIC | Age: 78
End: 2018-03-20

## 2018-03-20 RX ORDER — FLUOXETINE 10 MG/1
10 CAPSULE ORAL DAILY
Qty: 30 CAPSULE | Refills: 1 | Status: SHIPPED | OUTPATIENT
Start: 2018-03-20 | End: 2018-04-03

## 2018-03-20 NOTE — TELEPHONE ENCOUNTER
Let's have him try prozac 10mg once daily in AM #30 with 1 refill - this is usually one that will give patients a little more energy so shouldn't cause sedation. See Dr. Greg Diehl in 6 weeks for follow up. Stop zoloft.

## 2018-03-20 NOTE — TELEPHONE ENCOUNTER
See 3/7/18 ofc visit note-new order that date for sertraline 25 mg daily for situational depression. Call to [facility] RN/[facility]. sts believes pt started sertraline when ordered 3/7/18.    When she saw pt last wk, he reported taking med at 5

## 2018-03-20 NOTE — TELEPHONE ENCOUNTER
Pt was recently put on Sertraline and has stopped taking abruptly a couple days ago b/c it makes him feel foggy. Nurse asking if this is OK.

## 2018-03-20 NOTE — TELEPHONE ENCOUNTER
Pt was advised to start prozac, stop zoloft  Rx send to pharm   Advised to f/u in 6 weeks   With verbalized understanding

## 2018-03-21 ENCOUNTER — LAB ENCOUNTER (OUTPATIENT)
Dept: LAB | Facility: HOSPITAL | Age: 78
End: 2018-03-21
Attending: INTERNAL MEDICINE
Payer: MEDICARE

## 2018-03-21 ENCOUNTER — PRIOR ORIGINAL RECORDS (OUTPATIENT)
Dept: OTHER | Age: 78
End: 2018-03-21

## 2018-03-21 DIAGNOSIS — I10 HTN (HYPERTENSION): Primary | ICD-10-CM

## 2018-03-21 LAB
BUN BLD-MCNC: 30 MG/DL (ref 8–20)
CALCIUM BLD-MCNC: 9.6 MG/DL (ref 8.3–10.3)
CHLORIDE: 94 MMOL/L (ref 101–111)
CO2: 38 MMOL/L (ref 22–32)
CREAT BLD-MCNC: 0.97 MG/DL (ref 0.7–1.3)
GLUCOSE BLD-MCNC: 104 MG/DL (ref 70–99)
POTASSIUM SERPL-SCNC: 4.5 MMOL/L (ref 3.6–5.1)
SODIUM SERPL-SCNC: 139 MMOL/L (ref 136–144)

## 2018-03-21 PROCEDURE — 80048 BASIC METABOLIC PNL TOTAL CA: CPT

## 2018-03-21 PROCEDURE — 36415 COLL VENOUS BLD VENIPUNCTURE: CPT

## 2018-03-22 ENCOUNTER — APPOINTMENT (OUTPATIENT)
Dept: CARDIAC REHAB | Facility: HOSPITAL | Age: 78
End: 2018-03-22
Attending: INTERNAL MEDICINE
Payer: MEDICARE

## 2018-03-22 RX ORDER — FLUOXETINE 10 MG/1
CAPSULE ORAL
Qty: 90 CAPSULE | Refills: 1 | OUTPATIENT
Start: 2018-03-22

## 2018-03-23 ENCOUNTER — TELEPHONE (OUTPATIENT)
Dept: FAMILY MEDICINE CLINIC | Facility: CLINIC | Age: 78
End: 2018-03-23

## 2018-03-28 ENCOUNTER — CARDPULM VISIT (OUTPATIENT)
Dept: CARDIAC REHAB | Facility: HOSPITAL | Age: 78
End: 2018-03-28
Attending: INTERNAL MEDICINE
Payer: MEDICARE

## 2018-03-28 PROCEDURE — 93798 PHYS/QHP OP CAR RHAB W/ECG: CPT

## 2018-03-30 ENCOUNTER — CARDPULM VISIT (OUTPATIENT)
Dept: CARDIAC REHAB | Facility: HOSPITAL | Age: 78
End: 2018-03-30
Attending: INTERNAL MEDICINE
Payer: MEDICARE

## 2018-03-30 PROCEDURE — 93798 PHYS/QHP OP CAR RHAB W/ECG: CPT

## 2018-04-02 ENCOUNTER — CARDPULM VISIT (OUTPATIENT)
Dept: CARDIAC REHAB | Facility: HOSPITAL | Age: 78
End: 2018-04-02
Attending: INTERNAL MEDICINE
Payer: MEDICARE

## 2018-04-02 VITALS — WEIGHT: 176 LBS | BODY MASS INDEX: 23.33 KG/M2 | HEIGHT: 73 IN

## 2018-04-02 PROCEDURE — 93798 PHYS/QHP OP CAR RHAB W/ECG: CPT

## 2018-04-03 ENCOUNTER — APPOINTMENT (OUTPATIENT)
Dept: LAB | Facility: HOSPITAL | Age: 78
End: 2018-04-03
Payer: MEDICARE

## 2018-04-03 ENCOUNTER — HOSPITAL ENCOUNTER (OUTPATIENT)
Dept: ULTRASOUND IMAGING | Facility: HOSPITAL | Age: 78
Discharge: HOME | End: 2018-04-03
Attending: INTERNAL MEDICINE
Payer: MEDICARE

## 2018-04-03 ENCOUNTER — OFFICE VISIT (OUTPATIENT)
Dept: FAMILY MEDICINE CLINIC | Facility: CLINIC | Age: 78
End: 2018-04-03

## 2018-04-03 ENCOUNTER — APPOINTMENT (OUTPATIENT)
Dept: LAB | Facility: HOSPITAL | Age: 78
End: 2018-04-03
Attending: INTERNAL MEDICINE
Payer: MEDICARE

## 2018-04-03 ENCOUNTER — PATIENT OUTREACH (OUTPATIENT)
Dept: CASE MANAGEMENT | Age: 78
End: 2018-04-03

## 2018-04-03 VITALS
SYSTOLIC BLOOD PRESSURE: 102 MMHG | HEIGHT: 73 IN | HEART RATE: 68 BPM | BODY MASS INDEX: 23.76 KG/M2 | DIASTOLIC BLOOD PRESSURE: 60 MMHG | WEIGHT: 179.25 LBS | OXYGEN SATURATION: 93 % | TEMPERATURE: 97 F | RESPIRATION RATE: 22 BRPM

## 2018-04-03 DIAGNOSIS — I50.42 CHRONIC COMBINED SYSTOLIC AND DIASTOLIC CONGESTIVE HEART FAILURE (HCC): ICD-10-CM

## 2018-04-03 DIAGNOSIS — J43.8 OTHER EMPHYSEMA (HCC): ICD-10-CM

## 2018-04-03 DIAGNOSIS — I31.8 RESTRICTIVE PERICARDITIS: ICD-10-CM

## 2018-04-03 DIAGNOSIS — E11.42 DIABETIC POLYNEUROPATHY ASSOCIATED WITH TYPE 2 DIABETES MELLITUS (HCC): ICD-10-CM

## 2018-04-03 DIAGNOSIS — E78.00 PURE HYPERCHOLESTEROLEMIA: ICD-10-CM

## 2018-04-03 DIAGNOSIS — N40.0 BPH WITHOUT OBSTRUCTION/LOWER URINARY TRACT SYMPTOMS: ICD-10-CM

## 2018-04-03 DIAGNOSIS — E11.8 CONTROLLED TYPE 2 DIABETES MELLITUS WITH COMPLICATION, WITHOUT LONG-TERM CURRENT USE OF INSULIN (HCC): ICD-10-CM

## 2018-04-03 DIAGNOSIS — J96.11 CHRONIC RESPIRATORY FAILURE WITH HYPOXIA (HCC): ICD-10-CM

## 2018-04-03 DIAGNOSIS — Z95.2 S/P AVR (AORTIC VALVE REPLACEMENT): ICD-10-CM

## 2018-04-03 DIAGNOSIS — Z00.00 ENCOUNTER FOR ANNUAL HEALTH EXAMINATION: Primary | ICD-10-CM

## 2018-04-03 DIAGNOSIS — I10 ESSENTIAL HYPERTENSION: ICD-10-CM

## 2018-04-03 PROCEDURE — G0439 PPPS, SUBSEQ VISIT: HCPCS | Performed by: FAMILY MEDICINE

## 2018-04-03 PROCEDURE — 99213 OFFICE O/P EST LOW 20 MIN: CPT | Performed by: FAMILY MEDICINE

## 2018-04-03 NOTE — PROGRESS NOTES
Contacted spouse Noble Mark (on HIPAA) and stated hasn't had a chance to look into info that was sent, has been busy with pt and PT. Shaylee Guerin I would try back in a few weeks & verbalized understanding.

## 2018-04-03 NOTE — PROGRESS NOTES
HPI:   Fer Ibarra is a 69year old male who presents for a Medicare Subsequent Annual Wellness visit (Pt already had Initial Annual Wellness). The patient states that he is slowly recovering after his recent surgery for pericardiotomy.   He has b help              He has Walking problems based on screening of functional status. Difficulty walking?: Yes   He has problems with Daily Activities based on screening of functional status.    Problems with daily activities? : Yes   He has problems with Me pulmonary disease ([address]     Diabetes mellitus type 2, controlled, with complications ([address]     Essential hypertension     Pure hypercholesterolemia     SEBLE on CPAP     S/P AVR (aortic valve replacement)     Perennial non-allergic rhinitis     Pleural effus daily.   torsemide 20 MG Oral Tab Take 2 tablets (40 mg total) by mouth daily. (Patient taking differently: Take 20 mg by mouth.  Patient takes 20mg in AM and 20mg in PM )   ipratropium-albuterol 0.5-2.5 (3) MG/3ML Inhalation Solution Take 3 mL by nebulizat n/carpal tunnel surg (Left, 4/1/2016); angiogram (06/16/2017); valve replacement (01/30/2015); valve repair (07/21/2017); and other surgical history.     His family history includes Cirrhosis in his father and mother; Heart Attack in his brother; Heart Dise (Zostavax) 12/06/2012        ASSESSMENT AND OTHER RELEVANT CHRONIC CONDITIONS:   Shirley Alvarez is a 69year old male who presents for a Medicare Assessment.      PLAN SUMMARY:   Diagnoses and all orders for this visit:    Encounter for annual health exam No Follow-up on file. Kimmie Emmanuel MD, 4/3/2018     General Health     In the past six months, have you lost more than 10 pounds without trying?: 1 - Yes  Has your appetite been poor?: Yes  How does the patient maintain a good energy level? preventive care reminders to display for this patient.   Update Health Maintenance if applicable     Immunizations (Update Immunization Activity if applicable)     Influenza  Covered Annually 11/16/2017   Please get every year    Pneumococcal 13 (Prevnar) Calc (ug/mg)   Date Value   06/30/2017 83.0 (H)     MALB/CRE CALC (ug/mg)   Date Value   02/11/2014 381.8 (H)        LDL  Annually LDL-CHOLESTEROL (mg/dL (calc))   Date Value   11/18/2015 75     LDL Cholesterol (mg/dL)   Date Value   02/13/2018 123    No f

## 2018-04-03 NOTE — PATIENT INSTRUCTIONS
Dougie Campbell's SCREENING SCHEDULE   Tests on this list are recommended by your physician but may not be covered, or covered at this frequency, by your insurer. Please check with your insurance carrier before scheduling to verify coverage.     Lurdes Spencer 65-75)  No results found for this or any previous visit.  Limited to patients who meet one of the following criteria:   • Men who are 73-68 years old and have smoked more than 100 cigarettes in their lifetime   • Anyone with a family history    Colorectal C or any previous visit.  Medium/high risk factors:   End-stage renal disease   Hemophiliacs who received Factor VIII or IX concentrates   Clients of institutions for the mentally retarded   Persons who live in the same house as a HepB virus carrier   Homosex

## 2018-04-04 ENCOUNTER — CARDPULM VISIT (OUTPATIENT)
Dept: CARDIAC REHAB | Facility: HOSPITAL | Age: 78
End: 2018-04-04
Attending: INTERNAL MEDICINE
Payer: MEDICARE

## 2018-04-04 PROBLEM — Z23 NEED FOR VACCINATION: Status: RESOLVED | Noted: 2017-12-18 | Resolved: 2018-04-04

## 2018-04-04 PROBLEM — I31.1 CONSTRICTIVE PERICARDITIS: Status: RESOLVED | Noted: 2018-02-13 | Resolved: 2018-04-04

## 2018-04-04 PROBLEM — R09.02 HYPOXEMIA: Status: RESOLVED | Noted: 2017-12-08 | Resolved: 2018-04-04

## 2018-04-04 PROBLEM — Z98.890 S/P PERICARDIOCENTESIS: Status: RESOLVED | Noted: 2018-02-14 | Resolved: 2018-04-04

## 2018-04-04 PROBLEM — I50.9 CHF (CONGESTIVE HEART FAILURE), NYHA CLASS II (HCC): Status: RESOLVED | Noted: 2017-03-30 | Resolved: 2018-04-04

## 2018-04-04 PROBLEM — E46 MALNUTRITION (HCC): Status: RESOLVED | Noted: 2017-07-13 | Resolved: 2018-04-04

## 2018-04-04 PROBLEM — R06.00 DYSPNEA: Status: RESOLVED | Noted: 2017-03-20 | Resolved: 2018-04-04

## 2018-04-04 PROBLEM — R39.15 URINARY URGENCY: Status: RESOLVED | Noted: 2017-12-18 | Resolved: 2018-04-04

## 2018-04-04 PROBLEM — R07.9 ACUTE CHEST PAIN: Status: RESOLVED | Noted: 2017-03-20 | Resolved: 2018-04-04

## 2018-04-04 PROBLEM — S51.012A LACERATION OF LEFT ELBOW: Status: RESOLVED | Noted: 2017-12-18 | Resolved: 2018-04-04

## 2018-04-04 PROBLEM — N40.0 BPH WITHOUT OBSTRUCTION/LOWER URINARY TRACT SYMPTOMS: Status: ACTIVE | Noted: 2018-04-04

## 2018-04-04 PROBLEM — R79.89 AZOTEMIA: Status: RESOLVED | Noted: 2018-01-24 | Resolved: 2018-04-04

## 2018-04-04 PROBLEM — E87.3 METABOLIC ALKALOSIS: Status: RESOLVED | Noted: 2018-01-24 | Resolved: 2018-04-04

## 2018-04-04 PROBLEM — E87.6 HYPOKALEMIA: Status: RESOLVED | Noted: 2018-01-24 | Resolved: 2018-04-04

## 2018-04-04 PROBLEM — J44.1 COPD EXACERBATION (HCC): Status: RESOLVED | Noted: 2018-01-24 | Resolved: 2018-04-04

## 2018-04-04 PROBLEM — R06.00 DYSPNEA, UNSPECIFIED TYPE: Status: RESOLVED | Noted: 2017-03-20 | Resolved: 2018-04-04

## 2018-04-04 PROBLEM — D64.9 ANEMIA: Status: RESOLVED | Noted: 2018-01-24 | Resolved: 2018-04-04

## 2018-04-04 PROBLEM — R73.9 HYPERGLYCEMIA: Status: RESOLVED | Noted: 2018-01-24 | Resolved: 2018-04-04

## 2018-04-04 PROBLEM — I50.30: Chronic | Status: RESOLVED | Noted: 2017-07-13 | Resolved: 2018-04-04

## 2018-04-04 PROBLEM — R55 SYNCOPE, NEAR: Status: RESOLVED | Noted: 2017-12-08 | Resolved: 2018-04-04

## 2018-04-04 PROBLEM — R40.4 TRANSIENT ALTERATION OF AWARENESS: Status: RESOLVED | Noted: 2017-12-08 | Resolved: 2018-04-04

## 2018-04-04 LAB
BUN: 30 MG/DL
CALCIUM: 9.6 MG/DL
CHLORIDE: 94 MEQ/L
CREATININE, SERUM: 0.97 MG/DL
GLUCOSE: 104 MG/DL
POTASSIUM, SERUM: 4.5 MEQ/L
SODIUM: 139 MEQ/L

## 2018-04-04 PROCEDURE — 93798 PHYS/QHP OP CAR RHAB W/ECG: CPT

## 2018-04-05 ENCOUNTER — HOSPITAL ENCOUNTER (OUTPATIENT)
Dept: GENERAL RADIOLOGY | Facility: HOSPITAL | Age: 78
Discharge: HOME | End: 2018-04-05
Attending: RADIOLOGY
Payer: MEDICARE

## 2018-04-05 ENCOUNTER — APPOINTMENT (OUTPATIENT)
Dept: LAB | Facility: HOSPITAL | Age: 78
End: 2018-04-05
Attending: INTERNAL MEDICINE
Payer: MEDICARE

## 2018-04-05 ENCOUNTER — HOSPITAL ENCOUNTER (OUTPATIENT)
Dept: ULTRASOUND IMAGING | Facility: HOSPITAL | Age: 78
Discharge: HOME | End: 2018-04-05
Attending: INTERNAL MEDICINE
Payer: MEDICARE

## 2018-04-05 ENCOUNTER — PRIOR ORIGINAL RECORDS (OUTPATIENT)
Dept: OTHER | Age: 78
End: 2018-04-05

## 2018-04-05 VITALS
TEMPERATURE: 98 F | DIASTOLIC BLOOD PRESSURE: 57 MMHG | SYSTOLIC BLOOD PRESSURE: 126 MMHG | HEART RATE: 57 BPM | RESPIRATION RATE: 20 BRPM | OXYGEN SATURATION: 99 %

## 2018-04-05 DIAGNOSIS — J90 PLEURAL EFFUSION: Primary | ICD-10-CM

## 2018-04-05 DIAGNOSIS — J90 PLEURAL EFFUSION: ICD-10-CM

## 2018-04-05 DIAGNOSIS — E78.00 PURE HYPERCHOLESTEROLEMIA: ICD-10-CM

## 2018-04-05 DIAGNOSIS — I10 ESSENTIAL HYPERTENSION: ICD-10-CM

## 2018-04-05 DIAGNOSIS — I50.42 CHRONIC COMBINED SYSTOLIC AND DIASTOLIC CONGESTIVE HEART FAILURE (HCC): ICD-10-CM

## 2018-04-05 DIAGNOSIS — J96.11 CHRONIC RESPIRATORY FAILURE WITH HYPOXIA (HCC): ICD-10-CM

## 2018-04-05 DIAGNOSIS — N40.0 BPH WITHOUT OBSTRUCTION/LOWER URINARY TRACT SYMPTOMS: ICD-10-CM

## 2018-04-05 DIAGNOSIS — E11.42 DIABETIC POLYNEUROPATHY ASSOCIATED WITH TYPE 2 DIABETES MELLITUS (HCC): ICD-10-CM

## 2018-04-05 DIAGNOSIS — E11.8 CONTROLLED TYPE 2 DIABETES MELLITUS WITH COMPLICATION, WITHOUT LONG-TERM CURRENT USE OF INSULIN (HCC): ICD-10-CM

## 2018-04-05 PROCEDURE — 85025 COMPLETE CBC W/AUTO DIFF WBC: CPT

## 2018-04-05 PROCEDURE — 82945 GLUCOSE OTHER FLUID: CPT | Performed by: INTERNAL MEDICINE

## 2018-04-05 PROCEDURE — 85027 COMPLETE CBC AUTOMATED: CPT

## 2018-04-05 PROCEDURE — 88108 CYTOPATH CONCENTRATE TECH: CPT | Performed by: INTERNAL MEDICINE

## 2018-04-05 PROCEDURE — 80061 LIPID PANEL: CPT

## 2018-04-05 PROCEDURE — 83615 LACTATE (LD) (LDH) ENZYME: CPT | Performed by: INTERNAL MEDICINE

## 2018-04-05 PROCEDURE — 83036 HEMOGLOBIN GLYCOSYLATED A1C: CPT

## 2018-04-05 PROCEDURE — 36415 COLL VENOUS BLD VENIPUNCTURE: CPT

## 2018-04-05 PROCEDURE — 85610 PROTHROMBIN TIME: CPT

## 2018-04-05 PROCEDURE — 80053 COMPREHEN METABOLIC PANEL: CPT

## 2018-04-05 PROCEDURE — 89051 BODY FLUID CELL COUNT: CPT | Performed by: INTERNAL MEDICINE

## 2018-04-05 PROCEDURE — 89050 BODY FLUID CELL COUNT: CPT | Performed by: INTERNAL MEDICINE

## 2018-04-05 PROCEDURE — 84157 ASSAY OF PROTEIN OTHER: CPT | Performed by: INTERNAL MEDICINE

## 2018-04-05 PROCEDURE — 87070 CULTURE OTHR SPECIMN AEROBIC: CPT | Performed by: INTERNAL MEDICINE

## 2018-04-05 PROCEDURE — 32555 ASPIRATE PLEURA W/ IMAGING: CPT | Performed by: INTERNAL MEDICINE

## 2018-04-05 PROCEDURE — 71045 X-RAY EXAM CHEST 1 VIEW: CPT | Performed by: RADIOLOGY

## 2018-04-05 PROCEDURE — 88305 TISSUE EXAM BY PATHOLOGIST: CPT | Performed by: INTERNAL MEDICINE

## 2018-04-05 PROCEDURE — 84478 ASSAY OF TRIGLYCERIDES: CPT | Performed by: INTERNAL MEDICINE

## 2018-04-05 PROCEDURE — 87205 SMEAR GRAM STAIN: CPT | Performed by: INTERNAL MEDICINE

## 2018-04-05 RX ORDER — HYDROCODONE BITARTRATE AND ACETAMINOPHEN 5; 325 MG/1; MG/1
1 TABLET ORAL EVERY 4 HOURS PRN
Status: DISCONTINUED | OUTPATIENT
Start: 2018-04-05 | End: 2018-04-07

## 2018-04-05 RX ORDER — MORPHINE SULFATE 2 MG/ML
2 INJECTION, SOLUTION INTRAMUSCULAR; INTRAVENOUS EVERY 2 HOUR PRN
Status: DISCONTINUED | OUTPATIENT
Start: 2018-04-05 | End: 2018-04-07

## 2018-04-05 RX ORDER — ACETAMINOPHEN 325 MG/1
650 TABLET ORAL EVERY 6 HOURS PRN
Status: DISCONTINUED | OUTPATIENT
Start: 2018-04-05 | End: 2018-04-07

## 2018-04-05 RX ORDER — HYDROCODONE BITARTRATE AND ACETAMINOPHEN 5; 325 MG/1; MG/1
2 TABLET ORAL EVERY 4 HOURS PRN
Status: DISCONTINUED | OUTPATIENT
Start: 2018-04-05 | End: 2018-04-07

## 2018-04-05 NOTE — IMAGING NOTE
6012 Patient I here for ultrasound guided thoracentesis. Patient's wife Gwyn Shi at bedside. Pre procedure VSS on RA. Patient reported last PO last night at [address]. Morning meds at 0715 with sips of water. Patient positioned sitting on side of cart.  0930 Dr. Carrington Beebe

## 2018-04-05 NOTE — PROCEDURES
[facility]  Pre-Procedure Note    Name: Maurizio Dinero  MRN#: [id number]  : 1940    Procedure:  Ultrasound Guided Thoracentesis    Indication:  Symptomatic Pleural Effusion    Allergies:      Radiology Contrast *    Tightness in Throat    Com

## 2018-04-05 NOTE — PROCEDURES
[address] Patient Status:  Outpatient    1940 MRN [id number]   Location [address] [place] [address] Attending Maday Browning MD   [facility] Day # 0 PCP Lanre Morales MD         Procedure Report    Pre-Operative Diagnosis: S

## 2018-04-06 DIAGNOSIS — E11.8 CONTROLLED TYPE 2 DIABETES MELLITUS WITH COMPLICATION, WITHOUT LONG-TERM CURRENT USE OF INSULIN (HCC): Primary | ICD-10-CM

## 2018-04-06 RX ORDER — GLIMEPIRIDE 1 MG/1
1 TABLET ORAL
Qty: 90 TABLET | Refills: 0 | Status: SHIPPED | OUTPATIENT
Start: 2018-04-06 | End: 2018-07-10

## 2018-04-09 ENCOUNTER — CARDPULM VISIT (OUTPATIENT)
Dept: CARDIAC REHAB | Facility: HOSPITAL | Age: 78
End: 2018-04-09
Attending: INTERNAL MEDICINE
Payer: MEDICARE

## 2018-04-09 PROCEDURE — 93798 PHYS/QHP OP CAR RHAB W/ECG: CPT

## 2018-04-11 ENCOUNTER — CARDPULM VISIT (OUTPATIENT)
Dept: CARDIAC REHAB | Facility: HOSPITAL | Age: 78
End: 2018-04-11
Attending: INTERNAL MEDICINE
Payer: MEDICARE

## 2018-04-11 PROCEDURE — 93798 PHYS/QHP OP CAR RHAB W/ECG: CPT

## 2018-04-12 ENCOUNTER — HOSPITAL ENCOUNTER (OUTPATIENT)
Dept: CV DIAGNOSTICS | Facility: HOSPITAL | Age: 78
Discharge: HOME | End: 2018-04-12
Attending: FAMILY MEDICINE
Payer: MEDICARE

## 2018-04-12 DIAGNOSIS — I31.8 RESTRICTIVE PERICARDITIS: ICD-10-CM

## 2018-04-12 PROCEDURE — 93306 TTE W/DOPPLER COMPLETE: CPT | Performed by: FAMILY MEDICINE

## 2018-04-13 ENCOUNTER — CARDPULM VISIT (OUTPATIENT)
Dept: CARDIAC REHAB | Facility: HOSPITAL | Age: 78
End: 2018-04-13
Attending: INTERNAL MEDICINE
Payer: MEDICARE

## 2018-04-13 PROCEDURE — 93798 PHYS/QHP OP CAR RHAB W/ECG: CPT

## 2018-04-16 ENCOUNTER — CARDPULM VISIT (OUTPATIENT)
Dept: CARDIAC REHAB | Facility: HOSPITAL | Age: 78
End: 2018-04-16
Attending: INTERNAL MEDICINE
Payer: MEDICARE

## 2018-04-16 PROCEDURE — 93798 PHYS/QHP OP CAR RHAB W/ECG: CPT

## 2018-04-18 ENCOUNTER — CARDPULM VISIT (OUTPATIENT)
Dept: CARDIAC REHAB | Facility: HOSPITAL | Age: 78
End: 2018-04-18
Attending: INTERNAL MEDICINE
Payer: MEDICARE

## 2018-04-18 PROCEDURE — 93798 PHYS/QHP OP CAR RHAB W/ECG: CPT

## 2018-04-19 PROBLEM — R09.81 NASAL CONGESTION: Status: ACTIVE | Noted: 2018-04-19

## 2018-04-19 PROBLEM — J34.2 NASAL SEPTAL DEVIATION: Status: ACTIVE | Noted: 2018-04-19

## 2018-04-20 ENCOUNTER — CARDPULM VISIT (OUTPATIENT)
Dept: CARDIAC REHAB | Facility: HOSPITAL | Age: 78
End: 2018-04-20
Attending: INTERNAL MEDICINE
Payer: MEDICARE

## 2018-04-20 PROCEDURE — 93798 PHYS/QHP OP CAR RHAB W/ECG: CPT

## 2018-04-23 ENCOUNTER — CARDPULM VISIT (OUTPATIENT)
Dept: CARDIAC REHAB | Facility: HOSPITAL | Age: 78
End: 2018-04-23
Attending: INTERNAL MEDICINE
Payer: MEDICARE

## 2018-04-23 PROCEDURE — 93798 PHYS/QHP OP CAR RHAB W/ECG: CPT

## 2018-04-26 LAB
ALBUMIN: 2.9 G/DL
ALKALINE PHOSPHATATE(ALK PHOS): 115 IU/L
BILIRUBIN TOTAL: 0.3 MG/DL
BUN: 19 MG/DL
CALCIUM: 9.4 MG/DL
CHLORIDE: 99 MEQ/L
CHOLESTEROL, TOTAL: 128 MG/DL
CREATININE, SERUM: 0.97 MG/DL
GLUCOSE: 131 MG/DL
HDL CHOLESTEROL: 44 MG/DL
HEMATOCRIT: 37.9 %
HEMOGLOBIN: 11.5 G/DL
LDL CHOLESTEROL: 63 MG/DL
PLATELETS: 229 K/UL
POTASSIUM, SERUM: 4.6 MEQ/L
PROTEIN, TOTAL: 7.8 G/DL
RED BLOOD COUNT: 4.7 X 10-6/U
SGOT (AST): 15 IU/L
SGPT (ALT): 14 IU/L
SODIUM: 137 MEQ/L
TRIGLYCERIDES: 106 MG/DL
WHITE BLOOD COUNT: 5.1 X 10-3/U

## 2018-05-02 ENCOUNTER — MED REC SCAN ONLY (OUTPATIENT)
Dept: FAMILY MEDICINE CLINIC | Facility: CLINIC | Age: 78
End: 2018-05-02

## 2018-05-08 ENCOUNTER — PRIOR ORIGINAL RECORDS (OUTPATIENT)
Dept: OTHER | Age: 78
End: 2018-05-08

## 2018-05-15 ENCOUNTER — PRIOR ORIGINAL RECORDS (OUTPATIENT)
Dept: OTHER | Age: 78
End: 2018-05-15

## 2018-05-15 ENCOUNTER — MYAURORA ACCOUNT LINK (OUTPATIENT)
Dept: OTHER | Age: 78
End: 2018-05-15

## 2018-05-30 DIAGNOSIS — E78.00 PURE HYPERCHOLESTEROLEMIA: ICD-10-CM

## 2018-05-31 RX ORDER — ATORVASTATIN CALCIUM 40 MG/1
TABLET, FILM COATED ORAL
Qty: 90 TABLET | Refills: 0 | Status: SHIPPED | OUTPATIENT
Start: 2018-05-31 | End: 2018-08-28

## 2018-06-05 ENCOUNTER — PATIENT OUTREACH (OUTPATIENT)
Dept: CASE MANAGEMENT | Age: 78
End: 2018-06-05

## 2018-06-05 NOTE — PROGRESS NOTES
Attempted f/up with pt spouse Forestine Eans regarding info that was sent with no answer. Will continue contacting to discuss.

## 2018-06-07 LAB
ALBUMIN: 3.4 G/DL
ALKALINE PHOSPHATATE(ALK PHOS): 108 IU/L
BILIRUBIN TOTAL: 0.35 MG/DL
BUN: 27 MG/DL
CALCIUM: 9.7 MG/DL
CHLORIDE: 100 MEQ/L
CREATININE, SERUM: 1.13 MG/DL
GLUCOSE: 110 MG/DL
HEMATOCRIT: 37 %
HEMOGLOBIN: 10.9 G/DL
PLATELETS: 258 K/UL
POTASSIUM, SERUM: 4.2 MEQ/L
PROTEIN, TOTAL: 7.6 G/DL
RED BLOOD COUNT: 4.68 X 10-6/U
SGOT (AST): 13 IU/L
SGPT (ALT): 14 IU/L
SODIUM: 141 MEQ/L
WHITE BLOOD COUNT: 4.88 X 10-3/U

## 2018-07-05 ENCOUNTER — MED REC SCAN ONLY (OUTPATIENT)
Dept: FAMILY MEDICINE CLINIC | Facility: CLINIC | Age: 78
End: 2018-07-05

## 2018-07-05 ENCOUNTER — HOSPITAL ENCOUNTER (OUTPATIENT)
Dept: GENERAL RADIOLOGY | Facility: HOSPITAL | Age: 78
Discharge: HOME | End: 2018-07-05
Attending: INTERNAL MEDICINE
Payer: MEDICARE

## 2018-07-05 ENCOUNTER — LAB ENCOUNTER (OUTPATIENT)
Dept: LAB | Facility: HOSPITAL | Age: 78
End: 2018-07-05
Attending: INTERNAL MEDICINE
Payer: MEDICARE

## 2018-07-05 ENCOUNTER — PRIOR ORIGINAL RECORDS (OUTPATIENT)
Dept: OTHER | Age: 78
End: 2018-07-05

## 2018-07-05 DIAGNOSIS — E11.8 CONTROLLED TYPE 2 DIABETES MELLITUS WITH COMPLICATION, WITHOUT LONG-TERM CURRENT USE OF INSULIN (HCC): ICD-10-CM

## 2018-07-05 DIAGNOSIS — T46.2X1A: ICD-10-CM

## 2018-07-05 DIAGNOSIS — G62.0 AMIODARONE INDUCED NEUROPATHY (HCC): Primary | ICD-10-CM

## 2018-07-05 DIAGNOSIS — T46.2X5A AMIODARONE INDUCED NEUROPATHY (HCC): Primary | ICD-10-CM

## 2018-07-05 DIAGNOSIS — E11.42 DIABETIC POLYNEUROPATHY ASSOCIATED WITH TYPE 2 DIABETES MELLITUS (HCC): ICD-10-CM

## 2018-07-05 LAB
ALBUMIN SERPL-MCNC: 3.4 G/DL (ref 3.5–4.8)
ALP LIVER SERPL-CCNC: 102 U/L (ref 45–117)
ALT SERPL-CCNC: 16 U/L (ref 17–63)
AST SERPL-CCNC: 13 U/L (ref 15–41)
BILIRUB SERPL-MCNC: 0.4 MG/DL (ref 0.1–2)
BUN BLD-MCNC: 16 MG/DL (ref 8–20)
CALCIUM BLD-MCNC: 9.3 MG/DL (ref 8.3–10.3)
CHLORIDE: 102 MMOL/L (ref 101–111)
CO2: 32 MMOL/L (ref 22–32)
CREAT BLD-MCNC: 0.91 MG/DL (ref 0.7–1.3)
EST. AVERAGE GLUCOSE BLD GHB EST-MCNC: 120 MG/DL (ref 68–126)
FREE T4: 0.6 NG/DL (ref 0.9–1.8)
GLUCOSE BLD-MCNC: 103 MG/DL (ref 70–99)
HBA1C MFR BLD HPLC: 5.8 % (ref ?–5.7)
M PROTEIN MFR SERPL ELPH: 7.9 G/DL (ref 6.1–8.3)
POTASSIUM SERPL-SCNC: 4.8 MMOL/L (ref 3.6–5.1)
SODIUM SERPL-SCNC: 138 MMOL/L (ref 136–144)
TSI SER-ACNC: 22.1 MIU/ML (ref 0.35–5.5)

## 2018-07-05 PROCEDURE — 84443 ASSAY THYROID STIM HORMONE: CPT

## 2018-07-05 PROCEDURE — 83036 HEMOGLOBIN GLYCOSYLATED A1C: CPT

## 2018-07-05 PROCEDURE — 71046 X-RAY EXAM CHEST 2 VIEWS: CPT | Performed by: INTERNAL MEDICINE

## 2018-07-05 PROCEDURE — 80053 COMPREHEN METABOLIC PANEL: CPT

## 2018-07-05 PROCEDURE — 36415 COLL VENOUS BLD VENIPUNCTURE: CPT

## 2018-07-05 PROCEDURE — 84439 ASSAY OF FREE THYROXINE: CPT

## 2018-07-06 ENCOUNTER — PRIOR ORIGINAL RECORDS (OUTPATIENT)
Dept: OTHER | Age: 78
End: 2018-07-06

## 2018-07-10 ENCOUNTER — OFFICE VISIT (OUTPATIENT)
Dept: FAMILY MEDICINE CLINIC | Facility: CLINIC | Age: 78
End: 2018-07-10

## 2018-07-10 VITALS
TEMPERATURE: 98 F | WEIGHT: 181 LBS | RESPIRATION RATE: 24 BRPM | DIASTOLIC BLOOD PRESSURE: 62 MMHG | SYSTOLIC BLOOD PRESSURE: 116 MMHG | BODY MASS INDEX: 23.99 KG/M2 | OXYGEN SATURATION: 88 % | HEART RATE: 64 BPM | HEIGHT: 73 IN

## 2018-07-10 DIAGNOSIS — I10 ESSENTIAL HYPERTENSION: ICD-10-CM

## 2018-07-10 DIAGNOSIS — J43.8 OTHER EMPHYSEMA (HCC): ICD-10-CM

## 2018-07-10 DIAGNOSIS — E78.00 PURE HYPERCHOLESTEROLEMIA: ICD-10-CM

## 2018-07-10 DIAGNOSIS — J90 PLEURAL EFFUSION: ICD-10-CM

## 2018-07-10 DIAGNOSIS — G47.33 OSA ON CPAP: ICD-10-CM

## 2018-07-10 DIAGNOSIS — E11.42 DIABETIC POLYNEUROPATHY ASSOCIATED WITH TYPE 2 DIABETES MELLITUS (HCC): ICD-10-CM

## 2018-07-10 DIAGNOSIS — E03.2 HYPOTHYROIDISM DUE TO MEDICATION: ICD-10-CM

## 2018-07-10 DIAGNOSIS — I50.42 CHRONIC COMBINED SYSTOLIC AND DIASTOLIC CONGESTIVE HEART FAILURE (HCC): ICD-10-CM

## 2018-07-10 DIAGNOSIS — E11.8 CONTROLLED TYPE 2 DIABETES MELLITUS WITH COMPLICATION, WITHOUT LONG-TERM CURRENT USE OF INSULIN (HCC): Primary | ICD-10-CM

## 2018-07-10 DIAGNOSIS — Z99.89 OSA ON CPAP: ICD-10-CM

## 2018-07-10 PROCEDURE — 99214 OFFICE O/P EST MOD 30 MIN: CPT | Performed by: FAMILY MEDICINE

## 2018-07-10 RX ORDER — TORSEMIDE 20 MG/1
20 TABLET ORAL 2 TIMES DAILY
Qty: 180 TABLET | Refills: 1 | Status: SHIPPED | OUTPATIENT
Start: 2018-07-10 | End: 2018-11-10

## 2018-07-10 RX ORDER — LEVOTHYROXINE SODIUM 0.03 MG/1
25 TABLET ORAL
Qty: 30 TABLET | Refills: 5 | Status: SHIPPED | OUTPATIENT
Start: 2018-07-10 | End: 2019-01-04

## 2018-07-10 RX ORDER — GLIMEPIRIDE 1 MG/1
1 TABLET ORAL
Qty: 90 TABLET | Refills: 1 | Status: SHIPPED | OUTPATIENT
Start: 2018-07-10 | End: 2018-12-30

## 2018-07-10 NOTE — PROGRESS NOTES
Zora Springer is a 69year old male. HPI:   Zora Springer is a 69year old male who presents for recheck of hyperlipidemia. Patient reports taking medications as instructed, no medication side effects noted. Denies any generalized muscle aches.  Aida Disp: 30 tablet Rfl: 5   spironolactone 25 MG Oral Tab Take 1 tablet (25 mg total) by mouth daily. Disp: 30 tablet Rfl: 3   ipratropium-albuterol 0.5-2.5 (3) MG/3ML Inhalation Solution Take 3 mL by nebulization 4 (four) times daily.  Disp: 240 vial Rfl: 0 mellitus ([facility]     Dx in 2005   • Unspecified intestinal obstruction    • Visual impairment       Social History:  Smoking status: Former Smoker                                                              Packs/day: 2.00      Years: 50.00        Types: Ci times daily. Patient takes 20mg in AM and 20mg in PM  Dispense: 180 tablet; Refill: 1  - COMP METABOLIC PANEL (14); Future    4.  Other emphysema ([address]  Continue follow-up with pulmonology  Continue 24-hour oxygen supplementation per pulmonology  Continue D

## 2018-07-16 ENCOUNTER — PRIOR ORIGINAL RECORDS (OUTPATIENT)
Dept: OTHER | Age: 78
End: 2018-07-16

## 2018-07-18 LAB
ALBUMIN: 3.4 G/DL
ALKALINE PHOSPHATATE(ALK PHOS): 102 IU/L
BILIRUBIN TOTAL: 0.4 MG/DL
BUN: 16 MG/DL
CALCIUM: 9.3 MG/DL
CHLORIDE: 102 MEQ/L
CREATININE, SERUM: 0.91 MG/DL
FREE T4: 0.6 MG/DL
GLUCOSE: 103 MG/DL
HEMOGLOBIN A1C: 5.8 %
POTASSIUM, SERUM: 4.8 MEQ/L
PROTEIN, TOTAL: 7.9 G/DL
SGOT (AST): 13 IU/L
SGPT (ALT): 16 IU/L
SODIUM: 138 MEQ/L
THYROID STIMULATING HORMONE: 22.1 MLU/L
THYROID STIMULATING HORMONE: 22.1 MLU/L

## 2018-07-26 ENCOUNTER — CARDPULM VISIT (OUTPATIENT)
Dept: CARDIAC REHAB | Facility: HOSPITAL | Age: 78
End: 2018-07-26
Attending: INTERNAL MEDICINE
Payer: MEDICARE

## 2018-07-26 VITALS
RESPIRATION RATE: 12 BRPM | OXYGEN SATURATION: 98 % | SYSTOLIC BLOOD PRESSURE: 100 MMHG | WEIGHT: 181 LBS | HEART RATE: 61 BPM | HEIGHT: 73 IN | DIASTOLIC BLOOD PRESSURE: 64 MMHG | BODY MASS INDEX: 23.99 KG/M2

## 2018-07-27 RX ORDER — GABAPENTIN 300 MG/1
CAPSULE ORAL
Qty: 540 CAPSULE | Refills: 0 | OUTPATIENT
Start: 2018-07-27

## 2018-07-30 ENCOUNTER — CARDPULM VISIT (OUTPATIENT)
Dept: CARDIAC REHAB | Facility: HOSPITAL | Age: 78
End: 2018-07-30
Attending: INTERNAL MEDICINE
Payer: MEDICARE

## 2018-08-01 ENCOUNTER — CARDPULM VISIT (OUTPATIENT)
Dept: CARDIAC REHAB | Facility: HOSPITAL | Age: 78
End: 2018-08-01
Attending: INTERNAL MEDICINE
Payer: MEDICARE

## 2018-08-01 ENCOUNTER — TELEPHONE (OUTPATIENT)
Dept: FAMILY MEDICINE CLINIC | Facility: CLINIC | Age: 78
End: 2018-08-01

## 2018-08-01 DIAGNOSIS — J96.11 CHRONIC RESPIRATORY FAILURE WITH HYPOXIA (HCC): ICD-10-CM

## 2018-08-01 DIAGNOSIS — E11.42 DIABETIC POLYNEUROPATHY ASSOCIATED WITH TYPE 2 DIABETES MELLITUS (HCC): ICD-10-CM

## 2018-08-01 DIAGNOSIS — I31.8 RESTRICTIVE PERICARDITIS: Primary | ICD-10-CM

## 2018-08-01 DIAGNOSIS — I27.20 MILD PULMONARY HYPERTENSION (HCC): ICD-10-CM

## 2018-08-01 DIAGNOSIS — J43.8 OTHER EMPHYSEMA (HCC): ICD-10-CM

## 2018-08-01 NOTE — TELEPHONE ENCOUNTER
Call from [facility] requesting order from dr Tristan Padilla for PT visit x1 for wheelchair evaluation/treatment. sts pt has been using a loaner wheelchair for past several months.  They have ordered and ins has approved new wheelchair which has arriv

## 2018-08-03 ENCOUNTER — CARDPULM VISIT (OUTPATIENT)
Dept: CARDIAC REHAB | Facility: HOSPITAL | Age: 78
End: 2018-08-03
Attending: INTERNAL MEDICINE
Payer: MEDICARE

## 2018-08-03 NOTE — TELEPHONE ENCOUNTER
Requested PT order has now been reviewed by [facility] central referral dept w note stating \"no authorizaation needed. \"  Order printed and faxed to [facility] as requested below. Call to [facility] reached identified voice mail.  Left m advising reques

## 2018-08-06 ENCOUNTER — CARDPULM VISIT (OUTPATIENT)
Dept: CARDIAC REHAB | Facility: HOSPITAL | Age: 78
End: 2018-08-06
Attending: INTERNAL MEDICINE
Payer: MEDICARE

## 2018-08-07 RX ORDER — GABAPENTIN 300 MG/1
CAPSULE ORAL
Qty: 540 CAPSULE | Refills: 0 | Status: SHIPPED | OUTPATIENT
Start: 2018-08-07 | End: 2018-10-01

## 2018-08-08 ENCOUNTER — CARDPULM VISIT (OUTPATIENT)
Dept: CARDIAC REHAB | Facility: HOSPITAL | Age: 78
End: 2018-08-08
Attending: INTERNAL MEDICINE
Payer: MEDICARE

## 2018-08-10 ENCOUNTER — CARDPULM VISIT (OUTPATIENT)
Dept: CARDIAC REHAB | Facility: HOSPITAL | Age: 78
End: 2018-08-10
Attending: INTERNAL MEDICINE
Payer: MEDICARE

## 2018-08-13 ENCOUNTER — CARDPULM VISIT (OUTPATIENT)
Dept: CARDIAC REHAB | Facility: HOSPITAL | Age: 78
End: 2018-08-13
Attending: INTERNAL MEDICINE
Payer: MEDICARE

## 2018-08-15 ENCOUNTER — CARDPULM VISIT (OUTPATIENT)
Dept: CARDIAC REHAB | Facility: HOSPITAL | Age: 78
End: 2018-08-15
Attending: INTERNAL MEDICINE
Payer: MEDICARE

## 2018-08-17 ENCOUNTER — CARDPULM VISIT (OUTPATIENT)
Dept: CARDIAC REHAB | Facility: HOSPITAL | Age: 78
End: 2018-08-17
Attending: INTERNAL MEDICINE
Payer: MEDICARE

## 2018-08-20 ENCOUNTER — CARDPULM VISIT (OUTPATIENT)
Dept: CARDIAC REHAB | Facility: HOSPITAL | Age: 78
End: 2018-08-20
Attending: INTERNAL MEDICINE
Payer: MEDICARE

## 2018-08-22 ENCOUNTER — CARDPULM VISIT (OUTPATIENT)
Dept: CARDIAC REHAB | Facility: HOSPITAL | Age: 78
End: 2018-08-22
Attending: INTERNAL MEDICINE
Payer: MEDICARE

## 2018-08-24 ENCOUNTER — CARDPULM VISIT (OUTPATIENT)
Dept: CARDIAC REHAB | Facility: HOSPITAL | Age: 78
End: 2018-08-24
Attending: INTERNAL MEDICINE
Payer: MEDICARE

## 2018-08-28 DIAGNOSIS — E78.00 PURE HYPERCHOLESTEROLEMIA: ICD-10-CM

## 2018-08-28 RX ORDER — ATORVASTATIN CALCIUM 40 MG/1
TABLET, FILM COATED ORAL
Qty: 90 TABLET | Refills: 0 | Status: SHIPPED | OUTPATIENT
Start: 2018-08-28 | End: 2018-12-30

## 2018-08-29 ENCOUNTER — CARDPULM VISIT (OUTPATIENT)
Dept: CARDIAC REHAB | Facility: HOSPITAL | Age: 78
End: 2018-08-29
Attending: INTERNAL MEDICINE
Payer: MEDICARE

## 2018-08-31 ENCOUNTER — CARDPULM VISIT (OUTPATIENT)
Dept: CARDIAC REHAB | Facility: HOSPITAL | Age: 78
End: 2018-08-31
Attending: INTERNAL MEDICINE
Payer: MEDICARE

## 2018-09-05 ENCOUNTER — CARDPULM VISIT (OUTPATIENT)
Dept: CARDIAC REHAB | Facility: HOSPITAL | Age: 78
End: 2018-09-05
Attending: INTERNAL MEDICINE
Payer: MEDICARE

## 2018-09-07 ENCOUNTER — CARDPULM VISIT (OUTPATIENT)
Dept: CARDIAC REHAB | Facility: HOSPITAL | Age: 78
End: 2018-09-07
Attending: INTERNAL MEDICINE
Payer: MEDICARE

## 2018-09-07 ENCOUNTER — HOSPITAL ENCOUNTER (OUTPATIENT)
Dept: GENERAL RADIOLOGY | Facility: HOSPITAL | Age: 78
Discharge: HOME | End: 2018-09-07
Attending: INTERNAL MEDICINE
Payer: MEDICARE

## 2018-09-07 DIAGNOSIS — I31.1 CONSTRICTIVE PERICARDITIS: ICD-10-CM

## 2018-09-07 DIAGNOSIS — J90 PLEURAL EFFUSION: ICD-10-CM

## 2018-09-07 PROCEDURE — 71046 X-RAY EXAM CHEST 2 VIEWS: CPT | Performed by: INTERNAL MEDICINE

## 2018-09-10 ENCOUNTER — CARDPULM VISIT (OUTPATIENT)
Dept: CARDIAC REHAB | Facility: HOSPITAL | Age: 78
End: 2018-09-10
Attending: INTERNAL MEDICINE
Payer: MEDICARE

## 2018-09-12 ENCOUNTER — CARDPULM VISIT (OUTPATIENT)
Dept: CARDIAC REHAB | Facility: HOSPITAL | Age: 78
End: 2018-09-12
Attending: INTERNAL MEDICINE
Payer: MEDICARE

## 2018-09-14 ENCOUNTER — CARDPULM VISIT (OUTPATIENT)
Dept: CARDIAC REHAB | Facility: HOSPITAL | Age: 78
End: 2018-09-14
Attending: INTERNAL MEDICINE
Payer: MEDICARE

## 2018-09-17 ENCOUNTER — CARDPULM VISIT (OUTPATIENT)
Dept: CARDIAC REHAB | Facility: HOSPITAL | Age: 78
End: 2018-09-17
Attending: INTERNAL MEDICINE
Payer: MEDICARE

## 2018-09-19 ENCOUNTER — CARDPULM VISIT (OUTPATIENT)
Dept: CARDIAC REHAB | Facility: HOSPITAL | Age: 78
End: 2018-09-19
Attending: INTERNAL MEDICINE
Payer: MEDICARE

## 2018-09-21 ENCOUNTER — CARDPULM VISIT (OUTPATIENT)
Dept: CARDIAC REHAB | Facility: HOSPITAL | Age: 78
End: 2018-09-21
Attending: INTERNAL MEDICINE
Payer: MEDICARE

## 2018-09-21 PROCEDURE — 93798 PHYS/QHP OP CAR RHAB W/ECG: CPT

## 2018-09-24 ENCOUNTER — CARDPULM VISIT (OUTPATIENT)
Dept: CARDIAC REHAB | Facility: HOSPITAL | Age: 78
End: 2018-09-24
Attending: INTERNAL MEDICINE
Payer: MEDICARE

## 2018-09-26 ENCOUNTER — APPOINTMENT (OUTPATIENT)
Dept: CARDIAC REHAB | Facility: HOSPITAL | Age: 78
End: 2018-09-26
Attending: INTERNAL MEDICINE
Payer: MEDICARE

## 2018-10-01 RX ORDER — GABAPENTIN 300 MG/1
CAPSULE ORAL
Qty: 540 CAPSULE | Refills: 0 | Status: SHIPPED | OUTPATIENT
Start: 2018-10-01 | End: 2018-10-11

## 2018-10-01 NOTE — TELEPHONE ENCOUNTER
GABAPENTIN 300MG CAPSULES    Non protocol medication. Please see pended medications. Please sign if appropriate. Thank you    The pt has an upcoming appt on 10/11/18.

## 2018-10-03 ENCOUNTER — TELEPHONE (OUTPATIENT)
Dept: FAMILY MEDICINE CLINIC | Facility: CLINIC | Age: 78
End: 2018-10-03

## 2018-10-03 NOTE — TELEPHONE ENCOUNTER
Pt.s wife Erica Shaikh called. Eryn Tovar has a cough and nasal congestion for the past week. He does not have a fever and is not having a hard time breathing or having any chest pain. An appt was made for him to come in on Thursday and see Dr. Jerad Cleveland.  I instructed

## 2018-10-04 ENCOUNTER — OFFICE VISIT (OUTPATIENT)
Dept: FAMILY MEDICINE CLINIC | Facility: CLINIC | Age: 78
End: 2018-10-04
Payer: MEDICARE

## 2018-10-04 VITALS
OXYGEN SATURATION: 88 % | WEIGHT: 186 LBS | DIASTOLIC BLOOD PRESSURE: 50 MMHG | SYSTOLIC BLOOD PRESSURE: 124 MMHG | RESPIRATION RATE: 56 BRPM | HEART RATE: 56 BPM | BODY MASS INDEX: 25 KG/M2

## 2018-10-04 DIAGNOSIS — J40 BRONCHITIS: Primary | ICD-10-CM

## 2018-10-04 DIAGNOSIS — J43.8 OTHER EMPHYSEMA (HCC): ICD-10-CM

## 2018-10-04 PROCEDURE — 99213 OFFICE O/P EST LOW 20 MIN: CPT | Performed by: FAMILY MEDICINE

## 2018-10-04 RX ORDER — AMOXICILLIN AND CLAVULANATE POTASSIUM 875; 125 MG/1; MG/1
1 TABLET, FILM COATED ORAL 2 TIMES DAILY
Qty: 20 TABLET | Refills: 0 | Status: SHIPPED | OUTPATIENT
Start: 2018-10-04 | End: 2018-10-15

## 2018-10-04 NOTE — PROGRESS NOTES
HPI:   Mayo Kenney is a 69year old male who presents for upper respiratory symptoms for  9  days.  Patient reports congestion, yellowish colored nasal discharge, cough with yellowish colored sputum, prior history of bronchitis, prior history of pneumo acetaminophen 500 MG Oral Tab Take 1,000 mg by mouth every 6 (six) hours as needed for Pain.  Disp:  Rfl:    Albuterol Sulfate (PROAIR RESPICLICK) 620 (90 Base) MCG/ACT Inhalation Aerosol Powder, Breath Activated Inhale 2 puffs into the lungs every 6 (six CVOR   • HEART CABG REDO N/A 2/13/2018    Performed by Ella Corbin MD at [address]  2006    ventral hernia repair; complex ventral herniorrhaphy with composix mesh   • OTHER SURGICAL HISTORY  02/11/2005    surgery for abdominal aortic an is minimal sinus tenderness  NECK: supple,no adenopathy,no bruits  LUNGS: Diminished bilaterally with rhonchi posteriorly  CARDIO: RRR without murmur  EXT: no cyanosis or clubbing    ASSESSMENT AND PLAN:     Bronchitis  (primary encounter diagnosis)  Other

## 2018-10-05 ENCOUNTER — APPOINTMENT (OUTPATIENT)
Dept: CARDIAC REHAB | Facility: HOSPITAL | Age: 78
End: 2018-10-05
Attending: INTERNAL MEDICINE
Payer: MEDICARE

## 2018-10-08 ENCOUNTER — TELEPHONE (OUTPATIENT)
Dept: FAMILY MEDICINE CLINIC | Facility: CLINIC | Age: 78
End: 2018-10-08

## 2018-10-08 DIAGNOSIS — R05.3 PERSISTENT COUGH: ICD-10-CM

## 2018-10-08 DIAGNOSIS — J43.8 OTHER EMPHYSEMA (HCC): ICD-10-CM

## 2018-10-08 DIAGNOSIS — J40 BRONCHITIS: Primary | ICD-10-CM

## 2018-10-08 NOTE — TELEPHONE ENCOUNTER
Call to pt-sts \"dr bass told me to call today if I wasn't any better. \"  Reports all symptoms \"the same as when I saw him last wk.  No better, no worse-same head and sinus congestion-no sinus drainage, chest congestion w occ unproductive cough, no fe

## 2018-10-08 NOTE — TELEPHONE ENCOUNTER
Pt seen on 10/4 for bronchitis and states he is not feeling any better and the medicine is not working. Please advise.

## 2018-10-09 ENCOUNTER — HOSPITAL ENCOUNTER (OUTPATIENT)
Dept: GENERAL RADIOLOGY | Facility: HOSPITAL | Age: 78
Discharge: HOME | End: 2018-10-09
Attending: FAMILY MEDICINE
Payer: MEDICARE

## 2018-10-09 ENCOUNTER — APPOINTMENT (OUTPATIENT)
Dept: LAB | Age: 78
End: 2018-10-09
Attending: FAMILY MEDICINE
Payer: MEDICARE

## 2018-10-09 DIAGNOSIS — E11.8 CONTROLLED TYPE 2 DIABETES MELLITUS WITH COMPLICATION, WITHOUT LONG-TERM CURRENT USE OF INSULIN (HCC): ICD-10-CM

## 2018-10-09 DIAGNOSIS — J90 PLEURAL EFFUSION: ICD-10-CM

## 2018-10-09 DIAGNOSIS — J43.8 OTHER EMPHYSEMA (HCC): ICD-10-CM

## 2018-10-09 DIAGNOSIS — R05.3 PERSISTENT COUGH: ICD-10-CM

## 2018-10-09 DIAGNOSIS — E11.42 DIABETIC POLYNEUROPATHY ASSOCIATED WITH TYPE 2 DIABETES MELLITUS (HCC): ICD-10-CM

## 2018-10-09 DIAGNOSIS — J40 BRONCHITIS: ICD-10-CM

## 2018-10-09 DIAGNOSIS — I10 ESSENTIAL HYPERTENSION: ICD-10-CM

## 2018-10-09 DIAGNOSIS — I50.42 CHRONIC COMBINED SYSTOLIC AND DIASTOLIC CONGESTIVE HEART FAILURE (HCC): ICD-10-CM

## 2018-10-09 DIAGNOSIS — E78.00 PURE HYPERCHOLESTEROLEMIA: ICD-10-CM

## 2018-10-09 PROCEDURE — 71046 X-RAY EXAM CHEST 2 VIEWS: CPT | Performed by: FAMILY MEDICINE

## 2018-10-09 PROCEDURE — 80053 COMPREHEN METABOLIC PANEL: CPT

## 2018-10-09 PROCEDURE — 80061 LIPID PANEL: CPT

## 2018-10-09 PROCEDURE — 82570 ASSAY OF URINE CREATININE: CPT

## 2018-10-09 PROCEDURE — 36415 COLL VENOUS BLD VENIPUNCTURE: CPT

## 2018-10-09 PROCEDURE — 83036 HEMOGLOBIN GLYCOSYLATED A1C: CPT

## 2018-10-09 PROCEDURE — 82043 UR ALBUMIN QUANTITATIVE: CPT

## 2018-10-09 NOTE — TELEPHONE ENCOUNTER
cxr order placed. Call to pt's home reaches voicemail. Left vmm req call back to triage nurse for dr instructions as soon as phones open at 830 am today.

## 2018-10-09 NOTE — TELEPHONE ENCOUNTER
Pt and spouse were in the office for lab work. They asked if we had returned a call to them from a call made yesterday. I informed them Wilfrid Arteaga RN called them this am and left a message.  Pt was instructed to continue with his Augmentin and obtain a chest xra

## 2018-10-10 ENCOUNTER — APPOINTMENT (OUTPATIENT)
Dept: CARDIAC REHAB | Facility: HOSPITAL | Age: 78
End: 2018-10-10
Attending: INTERNAL MEDICINE
Payer: MEDICARE

## 2018-10-11 ENCOUNTER — OFFICE VISIT (OUTPATIENT)
Dept: FAMILY MEDICINE CLINIC | Facility: CLINIC | Age: 78
End: 2018-10-11
Payer: MEDICARE

## 2018-10-11 VITALS
BODY MASS INDEX: 24 KG/M2 | RESPIRATION RATE: 24 BRPM | OXYGEN SATURATION: 94 % | SYSTOLIC BLOOD PRESSURE: 124 MMHG | TEMPERATURE: 98 F | WEIGHT: 184 LBS | DIASTOLIC BLOOD PRESSURE: 56 MMHG | HEART RATE: 56 BPM

## 2018-10-11 DIAGNOSIS — E78.00 PURE HYPERCHOLESTEROLEMIA: ICD-10-CM

## 2018-10-11 DIAGNOSIS — I50.42 CHRONIC COMBINED SYSTOLIC AND DIASTOLIC CONGESTIVE HEART FAILURE (HCC): ICD-10-CM

## 2018-10-11 DIAGNOSIS — E11.42 DIABETIC POLYNEUROPATHY ASSOCIATED WITH TYPE 2 DIABETES MELLITUS (HCC): Primary | ICD-10-CM

## 2018-10-11 DIAGNOSIS — Z99.89 OSA ON CPAP: ICD-10-CM

## 2018-10-11 DIAGNOSIS — J43.8 OTHER EMPHYSEMA (HCC): ICD-10-CM

## 2018-10-11 DIAGNOSIS — I10 ESSENTIAL HYPERTENSION: ICD-10-CM

## 2018-10-11 DIAGNOSIS — I27.20 MILD PULMONARY HYPERTENSION (HCC): ICD-10-CM

## 2018-10-11 DIAGNOSIS — E11.8 CONTROLLED TYPE 2 DIABETES MELLITUS WITH COMPLICATION, WITHOUT LONG-TERM CURRENT USE OF INSULIN (HCC): ICD-10-CM

## 2018-10-11 DIAGNOSIS — G47.33 OSA ON CPAP: ICD-10-CM

## 2018-10-11 PROCEDURE — G0008 ADMIN INFLUENZA VIRUS VAC: HCPCS | Performed by: FAMILY MEDICINE

## 2018-10-11 PROCEDURE — 90653 IIV ADJUVANT VACCINE IM: CPT | Performed by: FAMILY MEDICINE

## 2018-10-11 PROCEDURE — 99214 OFFICE O/P EST MOD 30 MIN: CPT | Performed by: FAMILY MEDICINE

## 2018-10-11 RX ORDER — GABAPENTIN 300 MG/1
CAPSULE ORAL
Qty: 720 CAPSULE | Refills: 1 | Status: SHIPPED | OUTPATIENT
Start: 2018-10-11 | End: 2019-04-05

## 2018-10-11 NOTE — PROGRESS NOTES
Carroll James is a 69year old male. HPI:   Carroll James is a 69year old male who presents for recheck of hyperlipidemia. Patient reports taking medications as instructed, no medication side effects noted. Denies any generalized muscle aches.  Aida by mouth daily with breakfast. Disp: 90 tablet Rfl: 1   torsemide 20 MG Oral Tab Take 1 tablet (20 mg total) by mouth 2 (two) times daily.  Patient takes 20mg in AM and 20mg in PM Disp: 180 tablet Rfl: 1   Levothyroxine Sodium (SYNTHROID) 25 MCG Oral Tab Ta • Microalbuminuria due to type 2 diabetes mellitus (HCC)    • Neuropathy     hands, legs, feet   • Pericarditis    • Peripheral vascular disease (HCC)    • Renal disorder     hx of nephrolithiasis (hx of multiple UTI's)   • S/P pericardial surgery     On Future  - LIPID PANEL; Future  - HEMOGLOBIN A1C; Future    2. Controlled type 2 diabetes mellitus with complication, without long-term current use of insulin (HCC)  Stop glimepiride  - COMP METABOLIC PANEL (14); Future  - LIPID PANEL;  Future  - HEMOGLOBIN

## 2018-10-12 ENCOUNTER — APPOINTMENT (OUTPATIENT)
Dept: CARDIAC REHAB | Facility: HOSPITAL | Age: 78
End: 2018-10-12
Attending: INTERNAL MEDICINE
Payer: MEDICARE

## 2018-10-15 ENCOUNTER — TELEPHONE (OUTPATIENT)
Dept: FAMILY MEDICINE CLINIC | Facility: CLINIC | Age: 78
End: 2018-10-15

## 2018-10-15 DIAGNOSIS — J40 BRONCHITIS: ICD-10-CM

## 2018-10-15 RX ORDER — AMOXICILLIN AND CLAVULANATE POTASSIUM 875; 125 MG/1; MG/1
1 TABLET, FILM COATED ORAL 2 TIMES DAILY
Qty: 14 TABLET | Refills: 0 | Status: SHIPPED | OUTPATIENT
Start: 2018-10-15 | End: 2018-10-22

## 2018-10-15 NOTE — TELEPHONE ENCOUNTER
Do an extra 7 days of Augmentin 875 twice daily. Patient should call me in 1 week if he is no better.

## 2018-10-15 NOTE — TELEPHONE ENCOUNTER
Pt calling with an update. He finished the 10 days of antibiotics and he feels about 50% better but the chest cold is not gone. Pt would like to know what he should do next. Please advise.

## 2018-10-15 NOTE — TELEPHONE ENCOUNTER
Call to pt-advised of dr comments/recommendations noted below. Advised med order sent to [facility]. Reinforced calling in one wk if no better-sooner if any new/worsening symptoms. Pt voices understanding/agrees with plan/no further questions.

## 2018-10-17 NOTE — PROGRESS NOTES
Contacted spouse Tez Guerrier to f/up with CCM and stated pt is currently doing PT and home health. Vish Cramer I will send additional info in the mail and f/up in a couple weeks, verbalized understanding. adult child(harman)

## 2018-10-26 ENCOUNTER — CARDPULM VISIT (OUTPATIENT)
Dept: CARDIAC REHAB | Facility: HOSPITAL | Age: 78
End: 2018-10-26
Attending: INTERNAL MEDICINE
Payer: MEDICARE

## 2018-10-29 ENCOUNTER — CARDPULM VISIT (OUTPATIENT)
Dept: CARDIAC REHAB | Facility: HOSPITAL | Age: 78
End: 2018-10-29
Attending: INTERNAL MEDICINE
Payer: MEDICARE

## 2018-10-31 ENCOUNTER — CARDPULM VISIT (OUTPATIENT)
Dept: CARDIAC REHAB | Facility: HOSPITAL | Age: 78
End: 2018-10-31
Attending: INTERNAL MEDICINE
Payer: MEDICARE

## 2018-10-31 PROCEDURE — 93798 PHYS/QHP OP CAR RHAB W/ECG: CPT

## 2018-11-02 ENCOUNTER — CARDPULM VISIT (OUTPATIENT)
Dept: CARDIAC REHAB | Facility: HOSPITAL | Age: 78
End: 2018-11-02
Attending: INTERNAL MEDICINE
Payer: MEDICARE

## 2018-11-05 ENCOUNTER — CARDPULM VISIT (OUTPATIENT)
Dept: CARDIAC REHAB | Facility: HOSPITAL | Age: 78
End: 2018-11-05
Attending: INTERNAL MEDICINE
Payer: MEDICARE

## 2018-11-07 ENCOUNTER — CARDPULM VISIT (OUTPATIENT)
Dept: CARDIAC REHAB | Facility: HOSPITAL | Age: 78
End: 2018-11-07
Attending: INTERNAL MEDICINE
Payer: MEDICARE

## 2018-11-07 ENCOUNTER — HOSPITAL ENCOUNTER (OUTPATIENT)
Dept: CT IMAGING | Facility: HOSPITAL | Age: 78
Discharge: HOME | End: 2018-11-07
Attending: INTERNAL MEDICINE
Payer: MEDICARE

## 2018-11-07 DIAGNOSIS — J90 PLEURAL EFFUSION: ICD-10-CM

## 2018-11-07 DIAGNOSIS — R91.1 PULMONARY NODULE: ICD-10-CM

## 2018-11-07 PROCEDURE — 71250 CT THORAX DX C-: CPT | Performed by: INTERNAL MEDICINE

## 2018-11-09 ENCOUNTER — CARDPULM VISIT (OUTPATIENT)
Dept: CARDIAC REHAB | Facility: HOSPITAL | Age: 78
End: 2018-11-09
Attending: INTERNAL MEDICINE
Payer: MEDICARE

## 2018-11-10 DIAGNOSIS — I50.42 CHRONIC COMBINED SYSTOLIC AND DIASTOLIC CONGESTIVE HEART FAILURE (HCC): ICD-10-CM

## 2018-11-10 DIAGNOSIS — J90 PLEURAL EFFUSION: ICD-10-CM

## 2018-11-12 ENCOUNTER — CARDPULM VISIT (OUTPATIENT)
Dept: CARDIAC REHAB | Facility: HOSPITAL | Age: 78
End: 2018-11-12
Attending: INTERNAL MEDICINE
Payer: MEDICARE

## 2018-11-12 RX ORDER — TORSEMIDE 20 MG/1
TABLET ORAL
Qty: 180 TABLET | Refills: 0 | Status: SHIPPED | OUTPATIENT
Start: 2018-11-12 | End: 2019-03-01

## 2018-11-14 ENCOUNTER — CARDPULM VISIT (OUTPATIENT)
Dept: CARDIAC REHAB | Facility: HOSPITAL | Age: 78
End: 2018-11-14
Attending: INTERNAL MEDICINE
Payer: MEDICARE

## 2018-11-16 ENCOUNTER — CARDPULM VISIT (OUTPATIENT)
Dept: CARDIAC REHAB | Facility: HOSPITAL | Age: 78
End: 2018-11-16
Attending: INTERNAL MEDICINE
Payer: MEDICARE

## 2018-11-19 ENCOUNTER — CARDPULM VISIT (OUTPATIENT)
Dept: CARDIAC REHAB | Facility: HOSPITAL | Age: 78
End: 2018-11-19
Attending: INTERNAL MEDICINE
Payer: MEDICARE

## 2018-11-30 ENCOUNTER — APPOINTMENT (OUTPATIENT)
Dept: CARDIAC REHAB | Facility: HOSPITAL | Age: 78
End: 2018-11-30
Attending: INTERNAL MEDICINE
Payer: MEDICARE

## 2018-12-03 ENCOUNTER — APPOINTMENT (OUTPATIENT)
Dept: CARDIAC REHAB | Facility: HOSPITAL | Age: 78
End: 2018-12-03
Attending: INTERNAL MEDICINE
Payer: MEDICARE

## 2018-12-05 ENCOUNTER — APPOINTMENT (OUTPATIENT)
Dept: CARDIAC REHAB | Facility: HOSPITAL | Age: 78
End: 2018-12-05
Attending: INTERNAL MEDICINE
Payer: MEDICARE

## 2018-12-20 ENCOUNTER — PRIOR ORIGINAL RECORDS (OUTPATIENT)
Dept: OTHER | Age: 78
End: 2018-12-20

## 2018-12-30 DIAGNOSIS — E11.42 DIABETIC POLYNEUROPATHY ASSOCIATED WITH TYPE 2 DIABETES MELLITUS (HCC): ICD-10-CM

## 2018-12-30 DIAGNOSIS — E11.8 CONTROLLED TYPE 2 DIABETES MELLITUS WITH COMPLICATION, WITHOUT LONG-TERM CURRENT USE OF INSULIN (HCC): ICD-10-CM

## 2018-12-30 DIAGNOSIS — E78.00 PURE HYPERCHOLESTEROLEMIA: ICD-10-CM

## 2019-01-01 ENCOUNTER — OFFICE VISIT (OUTPATIENT)
Dept: NEUROLOGY | Facility: CLINIC | Age: 79
End: 2019-01-01
Payer: MEDICARE

## 2019-01-01 ENCOUNTER — ANESTHESIA EVENT (OUTPATIENT)
Dept: ENDOSCOPY | Facility: HOSPITAL | Age: 79
End: 2019-01-01
Payer: MEDICARE

## 2019-01-01 ENCOUNTER — TELEPHONE (OUTPATIENT)
Dept: CARDIOLOGY | Age: 79
End: 2019-01-01

## 2019-01-01 ENCOUNTER — OFFICE VISIT (OUTPATIENT)
Dept: WOUND CARE | Facility: HOSPITAL | Age: 79
End: 2019-01-01
Attending: INTERNAL MEDICINE
Payer: MEDICARE

## 2019-01-01 ENCOUNTER — OFFICE VISIT (OUTPATIENT)
Dept: CARDIOLOGY | Age: 79
End: 2019-01-01

## 2019-01-01 ENCOUNTER — ANESTHESIA (OUTPATIENT)
Dept: ENDOSCOPY | Facility: HOSPITAL | Age: 79
End: 2019-01-01
Payer: MEDICARE

## 2019-01-01 ENCOUNTER — TELEPHONE (OUTPATIENT)
Dept: FAMILY MEDICINE CLINIC | Facility: CLINIC | Age: 79
End: 2019-01-01

## 2019-01-01 ENCOUNTER — HOSPITAL ENCOUNTER (OUTPATIENT)
Dept: GENERAL RADIOLOGY | Facility: HOSPITAL | Age: 79
Discharge: HOME | End: 2019-01-01
Attending: INTERNAL MEDICINE
Payer: MEDICARE

## 2019-01-01 ENCOUNTER — MED REC SCAN ONLY (OUTPATIENT)
Dept: FAMILY MEDICINE CLINIC | Facility: CLINIC | Age: 79
End: 2019-01-01

## 2019-01-01 ENCOUNTER — HOSPITAL ENCOUNTER (OUTPATIENT)
Dept: CT IMAGING | Facility: HOSPITAL | Age: 79
Discharge: HOME | End: 2019-01-01
Attending: INTERNAL MEDICINE
Payer: MEDICARE

## 2019-01-01 ENCOUNTER — HOSPITAL ENCOUNTER (OUTPATIENT)
Facility: HOSPITAL | Age: 79
Setting detail: HOSPITAL OUTPATIENT SURGERY
Discharge: HOME | End: 2019-01-01
Attending: INTERNAL MEDICINE | Admitting: INTERNAL MEDICINE
Payer: MEDICARE

## 2019-01-01 ENCOUNTER — EXTERNAL RECORD (OUTPATIENT)
Dept: HEALTH INFORMATION MANAGEMENT | Facility: OTHER | Age: 79
End: 2019-01-01

## 2019-01-01 ENCOUNTER — LAB ENCOUNTER (OUTPATIENT)
Dept: LAB | Facility: HOSPITAL | Age: 79
End: 2019-01-01
Attending: INTERNAL MEDICINE
Payer: MEDICARE

## 2019-01-01 ENCOUNTER — HOSPITAL ENCOUNTER (OUTPATIENT)
Dept: MRI IMAGING | Facility: HOSPITAL | Age: 79
Discharge: HOME | End: 2019-01-01
Attending: PHYSICIAN ASSISTANT
Payer: MEDICARE

## 2019-01-01 VITALS
WEIGHT: 197 LBS | DIASTOLIC BLOOD PRESSURE: 70 MMHG | SYSTOLIC BLOOD PRESSURE: 124 MMHG | RESPIRATION RATE: 18 BRPM | HEART RATE: 72 BPM | BODY MASS INDEX: 26 KG/M2

## 2019-01-01 VITALS
RESPIRATION RATE: 16 BRPM | OXYGEN SATURATION: 95 % | TEMPERATURE: 97 F | HEART RATE: 63 BPM | BODY MASS INDEX: 28.71 KG/M2 | SYSTOLIC BLOOD PRESSURE: 137 MMHG | WEIGHT: 212 LBS | DIASTOLIC BLOOD PRESSURE: 70 MMHG | HEIGHT: 72 IN

## 2019-01-01 VITALS
DIASTOLIC BLOOD PRESSURE: 58 MMHG | SYSTOLIC BLOOD PRESSURE: 100 MMHG | WEIGHT: 194 LBS | HEIGHT: 72 IN | HEART RATE: 85 BPM | BODY MASS INDEX: 26.28 KG/M2

## 2019-01-01 DIAGNOSIS — J90 PLEURAL EFFUSION: ICD-10-CM

## 2019-01-01 DIAGNOSIS — L97.512 DIABETIC ULCER OF RIGHT FOOT ASSOCIATED WITH TYPE 2 DIABETES MELLITUS, WITH FAT LAYER EXPOSED, UNSPECIFIED PART OF FOOT (HCC): Primary | ICD-10-CM

## 2019-01-01 DIAGNOSIS — R29.898 HAND WEAKNESS: ICD-10-CM

## 2019-01-01 DIAGNOSIS — E11.621 DIABETIC ULCER OF RIGHT FOOT ASSOCIATED WITH TYPE 2 DIABETES MELLITUS, WITH FAT LAYER EXPOSED, UNSPECIFIED PART OF FOOT (HCC): Primary | ICD-10-CM

## 2019-01-01 DIAGNOSIS — I10 ESSENTIAL HYPERTENSION: ICD-10-CM

## 2019-01-01 DIAGNOSIS — R53.82 CHRONIC FATIGUE: ICD-10-CM

## 2019-01-01 DIAGNOSIS — J84.9 ILD (INTERSTITIAL LUNG DISEASE) (HCC): ICD-10-CM

## 2019-01-01 DIAGNOSIS — E11.8 CONTROLLED TYPE 2 DIABETES MELLITUS WITH COMPLICATION, WITHOUT LONG-TERM CURRENT USE OF INSULIN (HCC): ICD-10-CM

## 2019-01-01 DIAGNOSIS — J98.4 RESTRICTIVE LUNG DISEASE: ICD-10-CM

## 2019-01-01 DIAGNOSIS — M79.2 NEUROPATHIC PAIN: ICD-10-CM

## 2019-01-01 DIAGNOSIS — R06.09 DYSPNEA ON EXERTION: Primary | ICD-10-CM

## 2019-01-01 DIAGNOSIS — I25.10 CORONARY ARTERY DISEASE INVOLVING NATIVE CORONARY ARTERY OF NATIVE HEART WITHOUT ANGINA PECTORIS: ICD-10-CM

## 2019-01-01 DIAGNOSIS — E78.00 PURE HYPERCHOLESTEROLEMIA: ICD-10-CM

## 2019-01-01 DIAGNOSIS — M19.049 HAND ARTHRITIS: ICD-10-CM

## 2019-01-01 DIAGNOSIS — Z86.010 PERSONAL HISTORY OF COLONIC POLYPS: ICD-10-CM

## 2019-01-01 DIAGNOSIS — G62.9 NEUROPATHY: Primary | ICD-10-CM

## 2019-01-01 DIAGNOSIS — G56.03 BILATERAL CARPAL TUNNEL SYNDROME: ICD-10-CM

## 2019-01-01 DIAGNOSIS — M47.22 OSTEOARTHRITIS OF SPINE WITH RADICULOPATHY, CERVICAL REGION: ICD-10-CM

## 2019-01-01 DIAGNOSIS — R53.1 WEAKNESS: ICD-10-CM

## 2019-01-01 DIAGNOSIS — E11.42 DIABETIC POLYNEUROPATHY ASSOCIATED WITH TYPE 2 DIABETES MELLITUS (HCC): ICD-10-CM

## 2019-01-01 DIAGNOSIS — Z95.2 S/P AORTIC VALVE REPLACEMENT WITH PROSTHETIC VALVE: ICD-10-CM

## 2019-01-01 DIAGNOSIS — G56.21 ULNAR NEUROPATHY AT ELBOW OF RIGHT UPPER EXTREMITY: ICD-10-CM

## 2019-01-01 PROCEDURE — 85025 COMPLETE CBC W/AUTO DIFF WBC: CPT

## 2019-01-01 PROCEDURE — 82962 GLUCOSE BLOOD TEST: CPT

## 2019-01-01 PROCEDURE — 86140 C-REACTIVE PROTEIN: CPT

## 2019-01-01 PROCEDURE — 71250 CT THORAX DX C-: CPT | Performed by: INTERNAL MEDICINE

## 2019-01-01 PROCEDURE — 99214 OFFICE O/P EST MOD 30 MIN: CPT

## 2019-01-01 PROCEDURE — 85652 RBC SED RATE AUTOMATED: CPT

## 2019-01-01 PROCEDURE — 29581 APPL MULTLAYER CMPRN SYS LEG: CPT

## 2019-01-01 PROCEDURE — 0DBL8ZX EXCISION OF TRANSVERSE COLON, VIA NATURAL OR ARTIFICIAL OPENING ENDOSCOPIC, DIAGNOSTIC: ICD-10-PCS | Performed by: INTERNAL MEDICINE

## 2019-01-01 PROCEDURE — 88305 TISSUE EXAM BY PATHOLOGIST: CPT | Performed by: INTERNAL MEDICINE

## 2019-01-01 PROCEDURE — 82550 ASSAY OF CK (CPK): CPT

## 2019-01-01 PROCEDURE — 71046 X-RAY EXAM CHEST 2 VIEWS: CPT | Performed by: INTERNAL MEDICINE

## 2019-01-01 PROCEDURE — 15275 SKIN SUB GRAFT FACE/NK/HF/G: CPT

## 2019-01-01 PROCEDURE — 80076 HEPATIC FUNCTION PANEL: CPT

## 2019-01-01 PROCEDURE — 99214 OFFICE O/P EST MOD 30 MIN: CPT | Performed by: OTHER

## 2019-01-01 PROCEDURE — 86160 COMPLEMENT ANTIGEN: CPT

## 2019-01-01 PROCEDURE — 36415 COLL VENOUS BLD VENIPUNCTURE: CPT

## 2019-01-01 PROCEDURE — 86225 DNA ANTIBODY NATIVE: CPT

## 2019-01-01 PROCEDURE — 99215 OFFICE O/P EST HI 40 MIN: CPT | Performed by: INTERNAL MEDICINE

## 2019-01-01 PROCEDURE — 82565 ASSAY OF CREATININE: CPT

## 2019-01-01 PROCEDURE — 0DBH8ZX EXCISION OF CECUM, VIA NATURAL OR ARTIFICIAL OPENING ENDOSCOPIC, DIAGNOSTIC: ICD-10-PCS | Performed by: INTERNAL MEDICINE

## 2019-01-01 PROCEDURE — 72141 MRI NECK SPINE W/O DYE: CPT | Performed by: PHYSICIAN ASSISTANT

## 2019-01-01 RX ORDER — NALOXONE HYDROCHLORIDE 0.4 MG/ML
80 INJECTION, SOLUTION INTRAMUSCULAR; INTRAVENOUS; SUBCUTANEOUS AS NEEDED
Status: CANCELLED | OUTPATIENT
Start: 2019-01-01 | End: 2019-01-01

## 2019-01-01 RX ORDER — SODIUM CHLORIDE, SODIUM LACTATE, POTASSIUM CHLORIDE, CALCIUM CHLORIDE 600; 310; 30; 20 MG/100ML; MG/100ML; MG/100ML; MG/100ML
INJECTION, SOLUTION INTRAVENOUS CONTINUOUS
Status: CANCELLED | OUTPATIENT
Start: 2019-01-01

## 2019-01-01 RX ORDER — DULOXETIN HYDROCHLORIDE 30 MG/1
30 CAPSULE, DELAYED RELEASE ORAL 2 TIMES DAILY
Qty: 60 CAPSULE | Refills: 11 | Status: SHIPPED | OUTPATIENT
Start: 2019-01-01

## 2019-01-01 RX ORDER — AMIODARONE HYDROCHLORIDE 100 MG/1
TABLET ORAL
Status: CANCELLED | OUTPATIENT
Start: 2019-01-01

## 2019-01-01 RX ORDER — DEXTROSE MONOHYDRATE 25 G/50ML
50 INJECTION, SOLUTION INTRAVENOUS
Status: CANCELLED | OUTPATIENT
Start: 2019-01-01

## 2019-01-01 RX ORDER — SODIUM CHLORIDE, SODIUM LACTATE, POTASSIUM CHLORIDE, CALCIUM CHLORIDE 600; 310; 30; 20 MG/100ML; MG/100ML; MG/100ML; MG/100ML
INJECTION, SOLUTION INTRAVENOUS CONTINUOUS
Status: DISCONTINUED | OUTPATIENT
Start: 2019-01-01 | End: 2019-01-01

## 2019-01-01 RX ORDER — DEXTROSE MONOHYDRATE 25 G/50ML
50 INJECTION, SOLUTION INTRAVENOUS
Status: DISCONTINUED | OUTPATIENT
Start: 2019-01-01 | End: 2019-01-01

## 2019-01-01 RX ORDER — GABAPENTIN 300 MG/1
CAPSULE ORAL
Qty: 720 CAPSULE | Refills: 1 | Status: SHIPPED | OUTPATIENT
Start: 2019-01-01 | End: 2020-01-01

## 2019-01-01 RX ORDER — ATORVASTATIN CALCIUM 40 MG/1
TABLET, FILM COATED ORAL
Qty: 90 TABLET | Refills: 1 | Status: SHIPPED | OUTPATIENT
Start: 2019-01-01 | End: 2020-01-01

## 2019-01-01 RX ORDER — LEVOTHYROXINE SODIUM 88 UG/1
TABLET ORAL
Qty: 90 TABLET | Refills: 0 | Status: SHIPPED | OUTPATIENT
Start: 2019-01-01 | End: 2020-01-01

## 2019-01-01 RX ORDER — LIDOCAINE HYDROCHLORIDE 10 MG/ML
INJECTION, SOLUTION EPIDURAL; INFILTRATION; INTRACAUDAL; PERINEURAL AS NEEDED
Status: DISCONTINUED | OUTPATIENT
Start: 2019-01-01 | End: 2019-01-01 | Stop reason: SURG

## 2019-01-01 RX ORDER — AMIODARONE HYDROCHLORIDE 100 MG/1
100 TABLET ORAL DAILY
Qty: 30 TABLET | Refills: 6 | Status: SHIPPED | OUTPATIENT
Start: 2019-01-01 | End: 2019-01-01 | Stop reason: ALTCHOICE

## 2019-01-01 RX ORDER — DULOXETIN HYDROCHLORIDE 30 MG/1
CAPSULE, DELAYED RELEASE ORAL
Refills: 11 | COMMUNITY
Start: 2019-01-01

## 2019-01-01 RX ADMIN — SODIUM CHLORIDE, SODIUM LACTATE, POTASSIUM CHLORIDE, CALCIUM CHLORIDE: 600; 310; 30; 20 INJECTION, SOLUTION INTRAVENOUS at 10:25:00

## 2019-01-01 RX ADMIN — SODIUM CHLORIDE, SODIUM LACTATE, POTASSIUM CHLORIDE, CALCIUM CHLORIDE: 600; 310; 30; 20 INJECTION, SOLUTION INTRAVENOUS at 09:49:00

## 2019-01-01 RX ADMIN — LIDOCAINE HYDROCHLORIDE 50 MG: 10 INJECTION, SOLUTION EPIDURAL; INFILTRATION; INTRACAUDAL; PERINEURAL at 09:49:00

## 2019-01-01 ASSESSMENT — PATIENT HEALTH QUESTIONNAIRE - PHQ9
SUM OF ALL RESPONSES TO PHQ9 QUESTIONS 1 AND 2: 0
1. LITTLE INTEREST OR PLEASURE IN DOING THINGS: NOT AT ALL
2. FEELING DOWN, DEPRESSED OR HOPELESS: NOT AT ALL
SUM OF ALL RESPONSES TO PHQ9 QUESTIONS 1 AND 2: 0

## 2019-01-03 ENCOUNTER — CARDPULM VISIT (OUTPATIENT)
Dept: CARDIAC REHAB | Facility: HOSPITAL | Age: 79
End: 2019-01-03
Attending: FAMILY MEDICINE

## 2019-01-03 RX ORDER — GLIMEPIRIDE 1 MG/1
TABLET ORAL
Qty: 90 TABLET | Refills: 0 | Status: SHIPPED | OUTPATIENT
Start: 2019-01-03 | End: 2019-05-14

## 2019-01-03 RX ORDER — ATORVASTATIN CALCIUM 40 MG/1
TABLET, FILM COATED ORAL
Qty: 90 TABLET | Refills: 0 | Status: SHIPPED | OUTPATIENT
Start: 2019-01-03 | End: 2019-03-29

## 2019-01-04 DIAGNOSIS — E03.2 HYPOTHYROIDISM DUE TO MEDICATION: ICD-10-CM

## 2019-01-04 RX ORDER — LEVOTHYROXINE SODIUM 0.03 MG/1
TABLET ORAL
Qty: 30 TABLET | Refills: 0 | Status: SHIPPED | OUTPATIENT
Start: 2019-01-04 | End: 2019-02-02

## 2019-01-10 ENCOUNTER — OFFICE VISIT (OUTPATIENT)
Dept: FAMILY MEDICINE CLINIC | Facility: CLINIC | Age: 79
End: 2019-01-10
Payer: MEDICARE

## 2019-01-10 VITALS
TEMPERATURE: 99 F | DIASTOLIC BLOOD PRESSURE: 58 MMHG | WEIGHT: 212 LBS | SYSTOLIC BLOOD PRESSURE: 104 MMHG | OXYGEN SATURATION: 98 % | HEART RATE: 64 BPM | BODY MASS INDEX: 28 KG/M2 | RESPIRATION RATE: 48 BRPM

## 2019-01-10 DIAGNOSIS — E11.8 CONTROLLED TYPE 2 DIABETES MELLITUS WITH COMPLICATION, WITHOUT LONG-TERM CURRENT USE OF INSULIN (HCC): Primary | ICD-10-CM

## 2019-01-10 DIAGNOSIS — N40.0 BPH WITHOUT OBSTRUCTION/LOWER URINARY TRACT SYMPTOMS: ICD-10-CM

## 2019-01-10 DIAGNOSIS — I10 ESSENTIAL HYPERTENSION: ICD-10-CM

## 2019-01-10 DIAGNOSIS — J43.8 OTHER EMPHYSEMA (HCC): ICD-10-CM

## 2019-01-10 DIAGNOSIS — Z95.2 S/P AVR (AORTIC VALVE REPLACEMENT): ICD-10-CM

## 2019-01-10 DIAGNOSIS — G47.33 OSA ON CPAP: ICD-10-CM

## 2019-01-10 DIAGNOSIS — I48.20 CHRONIC ATRIAL FIBRILLATION (HCC): ICD-10-CM

## 2019-01-10 DIAGNOSIS — I50.42 CHRONIC COMBINED SYSTOLIC AND DIASTOLIC CONGESTIVE HEART FAILURE (HCC): ICD-10-CM

## 2019-01-10 DIAGNOSIS — E78.00 PURE HYPERCHOLESTEROLEMIA: ICD-10-CM

## 2019-01-10 DIAGNOSIS — E03.2 HYPOTHYROIDISM DUE TO MEDICATION: ICD-10-CM

## 2019-01-10 DIAGNOSIS — Z99.89 OSA ON CPAP: ICD-10-CM

## 2019-01-10 DIAGNOSIS — J96.11 CHRONIC RESPIRATORY FAILURE WITH HYPOXIA (HCC): ICD-10-CM

## 2019-01-10 DIAGNOSIS — E11.42 DIABETIC POLYNEUROPATHY ASSOCIATED WITH TYPE 2 DIABETES MELLITUS (HCC): ICD-10-CM

## 2019-01-10 PROCEDURE — 99215 OFFICE O/P EST HI 40 MIN: CPT | Performed by: FAMILY MEDICINE

## 2019-01-10 NOTE — PROGRESS NOTES
Peggy Galvan is a 69year old male. HPI:   Peggy Galvan is a 69year old male who presents for recheck of hyperlipidemia. Patient reports taking medications as instructed, no medication side effects noted. Denies any generalized muscle aches.   His Activated  Disp:  Rfl: 5   gabapentin 300 MG Oral Cap TAKE 4 CAPSULES BY MOUTH TWICE DAILY Disp: 720 capsule Rfl: 1   amiodarone HCl 200 MG Oral Tab Take 1 tablet (200 mg total) by mouth daily.  (Patient taking differently: Take 100 mg by mouth daily.  ) [place] Browning feet   • Pericarditis    • Peripheral vascular disease (HCC)    • Renal disorder     hx of nephrolithiasis (hx of multiple UTI's)   • S/P pericardial surgery     On 2/13/2018: re-do sternotomy and pericardectomy   • Shortness of breath    • Sleep apnea Diabetic polyneuropathy associated with type 2 diabetes mellitus (HCC)  Continue glimepiride 1 mg 1 tablet every morning  Continue gabapentin 300 mg 4 capsules twice daily  - COMP METABOLIC PANEL (14);  Future  - HEMOGLOBIN A1C; Future  - CBC WITH DIFFERENT

## 2019-01-24 ENCOUNTER — CARDPULM VISIT (OUTPATIENT)
Dept: CARDIAC REHAB | Facility: HOSPITAL | Age: 79
End: 2019-01-24
Attending: FAMILY MEDICINE

## 2019-02-02 DIAGNOSIS — E03.2 HYPOTHYROIDISM DUE TO MEDICATION: ICD-10-CM

## 2019-02-04 ENCOUNTER — APPOINTMENT (OUTPATIENT)
Dept: LAB | Age: 79
End: 2019-02-04
Attending: INTERNAL MEDICINE
Payer: MEDICARE

## 2019-02-04 RX ORDER — LEVOTHYROXINE SODIUM 0.03 MG/1
TABLET ORAL
Qty: 30 TABLET | Refills: 5 | Status: SHIPPED | OUTPATIENT
Start: 2019-02-04 | End: 2019-04-05

## 2019-02-28 VITALS
HEART RATE: 56 BPM | BODY MASS INDEX: 25.98 KG/M2 | WEIGHT: 196 LBS | DIASTOLIC BLOOD PRESSURE: 58 MMHG | SYSTOLIC BLOOD PRESSURE: 90 MMHG | HEIGHT: 73 IN

## 2019-02-28 VITALS — SYSTOLIC BLOOD PRESSURE: 108 MMHG | WEIGHT: 176 LBS | HEART RATE: 70 BPM | DIASTOLIC BLOOD PRESSURE: 56 MMHG

## 2019-02-28 VITALS — DIASTOLIC BLOOD PRESSURE: 60 MMHG | SYSTOLIC BLOOD PRESSURE: 108 MMHG | HEART RATE: 68 BPM

## 2019-02-28 VITALS
HEIGHT: 73 IN | WEIGHT: 179 LBS | DIASTOLIC BLOOD PRESSURE: 70 MMHG | HEART RATE: 64 BPM | SYSTOLIC BLOOD PRESSURE: 110 MMHG | BODY MASS INDEX: 23.72 KG/M2

## 2019-02-28 VITALS
HEART RATE: 60 BPM | BODY MASS INDEX: 26.24 KG/M2 | SYSTOLIC BLOOD PRESSURE: 112 MMHG | WEIGHT: 198 LBS | HEIGHT: 73 IN | DIASTOLIC BLOOD PRESSURE: 62 MMHG

## 2019-03-01 VITALS
BODY MASS INDEX: 28.99 KG/M2 | DIASTOLIC BLOOD PRESSURE: 50 MMHG | WEIGHT: 214 LBS | HEART RATE: 60 BPM | SYSTOLIC BLOOD PRESSURE: 118 MMHG | HEIGHT: 72 IN

## 2019-03-01 VITALS
HEIGHT: 72 IN | DIASTOLIC BLOOD PRESSURE: 60 MMHG | HEART RATE: 68 BPM | WEIGHT: 207 LBS | BODY MASS INDEX: 28.04 KG/M2 | SYSTOLIC BLOOD PRESSURE: 134 MMHG

## 2019-03-01 VITALS
BODY MASS INDEX: 29.53 KG/M2 | HEART RATE: 62 BPM | RESPIRATION RATE: 22 BRPM | OXYGEN SATURATION: 94 % | SYSTOLIC BLOOD PRESSURE: 98 MMHG | HEIGHT: 72 IN | DIASTOLIC BLOOD PRESSURE: 44 MMHG | WEIGHT: 218 LBS

## 2019-03-01 DIAGNOSIS — I50.42 CHRONIC COMBINED SYSTOLIC AND DIASTOLIC CONGESTIVE HEART FAILURE (HCC): ICD-10-CM

## 2019-03-01 DIAGNOSIS — J90 PLEURAL EFFUSION: ICD-10-CM

## 2019-03-01 RX ORDER — TORSEMIDE 20 MG/1
TABLET ORAL
Qty: 180 TABLET | Refills: 0 | Status: SHIPPED | OUTPATIENT
Start: 2019-03-01 | End: 2019-06-07

## 2019-03-14 ENCOUNTER — LAB ENCOUNTER (OUTPATIENT)
Dept: LAB | Age: 79
End: 2019-03-14
Attending: FAMILY MEDICINE
Payer: MEDICARE

## 2019-03-14 ENCOUNTER — TELEPHONE (OUTPATIENT)
Dept: FAMILY MEDICINE CLINIC | Facility: CLINIC | Age: 79
End: 2019-03-14

## 2019-03-14 ENCOUNTER — OFFICE VISIT (OUTPATIENT)
Dept: FAMILY MEDICINE CLINIC | Facility: CLINIC | Age: 79
End: 2019-03-14
Payer: MEDICARE

## 2019-03-14 VITALS
SYSTOLIC BLOOD PRESSURE: 128 MMHG | RESPIRATION RATE: 18 BRPM | HEART RATE: 60 BPM | TEMPERATURE: 97 F | DIASTOLIC BLOOD PRESSURE: 72 MMHG

## 2019-03-14 DIAGNOSIS — M17.12 ARTHRITIS OF LEFT KNEE: Primary | ICD-10-CM

## 2019-03-14 DIAGNOSIS — R26.9 ABNORMALITY OF GAIT AND MOBILITY: ICD-10-CM

## 2019-03-14 DIAGNOSIS — M17.12 ARTHRITIS OF LEFT KNEE: ICD-10-CM

## 2019-03-14 LAB
ALBUMIN SERPL-MCNC: 3.5 G/DL (ref 3.4–5)
ALBUMIN/GLOB SERPL: 0.9 {RATIO} (ref 1–2)
ALP LIVER SERPL-CCNC: 59 U/L (ref 45–117)
ALT SERPL-CCNC: 19 U/L (ref 16–61)
ANION GAP SERPL CALC-SCNC: 7 MMOL/L (ref 0–18)
AST SERPL-CCNC: 12 U/L (ref 15–37)
BASOPHILS # BLD AUTO: 0.02 X10(3) UL (ref 0–0.2)
BASOPHILS NFR BLD AUTO: 0.4 %
BILIRUB SERPL-MCNC: 0.3 MG/DL (ref 0.1–2)
BUN BLD-MCNC: 33 MG/DL (ref 7–18)
BUN/CREAT SERPL: 24.6 (ref 10–20)
CALCIUM BLD-MCNC: 9.2 MG/DL (ref 8.5–10.1)
CHLORIDE SERPL-SCNC: 100 MMOL/L (ref 98–107)
CO2 SERPL-SCNC: 34 MMOL/L (ref 21–32)
CREAT BLD-MCNC: 1.34 MG/DL (ref 0.7–1.3)
DEPRECATED RDW RBC AUTO: 49.7 FL (ref 35.1–46.3)
EOSINOPHIL # BLD AUTO: 0.05 X10(3) UL (ref 0–0.7)
EOSINOPHIL NFR BLD AUTO: 1 %
ERYTHROCYTE [DISTWIDTH] IN BLOOD BY AUTOMATED COUNT: 13.5 % (ref 11–15)
GLOBULIN PLAS-MCNC: 3.8 G/DL (ref 2.8–4.4)
GLUCOSE BLD-MCNC: 163 MG/DL (ref 70–99)
HCT VFR BLD AUTO: 39.4 % (ref 39–53)
HGB BLD-MCNC: 12.2 G/DL (ref 13–17.5)
IMM GRANULOCYTES # BLD AUTO: 0.03 X10(3) UL (ref 0–1)
IMM GRANULOCYTES NFR BLD: 0.6 %
LYMPHOCYTES # BLD AUTO: 0.4 X10(3) UL (ref 1–4)
LYMPHOCYTES NFR BLD AUTO: 7.6 %
M PROTEIN MFR SERPL ELPH: 7.3 G/DL (ref 6.4–8.2)
MCH RBC QN AUTO: 31 PG (ref 26–34)
MCHC RBC AUTO-ENTMCNC: 31 G/DL (ref 31–37)
MCV RBC AUTO: 100.3 FL (ref 80–100)
MONOCYTES # BLD AUTO: 0.38 X10(3) UL (ref 0.1–1)
MONOCYTES NFR BLD AUTO: 7.2 %
NEUTROPHILS # BLD AUTO: 4.37 X10 (3) UL (ref 1.5–7.7)
NEUTROPHILS # BLD AUTO: 4.37 X10(3) UL (ref 1.5–7.7)
NEUTROPHILS NFR BLD AUTO: 83.2 %
OSMOLALITY SERPL CALC.SUM OF ELEC: 303 MOSM/KG (ref 275–295)
PLATELET # BLD AUTO: 140 10(3)UL (ref 150–450)
POTASSIUM SERPL-SCNC: 4.5 MMOL/L (ref 3.5–5.1)
RBC # BLD AUTO: 3.93 X10(6)UL (ref 3.8–5.8)
RHEUMATOID FACT SERPL-ACNC: 16 IU/ML (ref ?–15)
SED RATE-ML: 16 MM/HR (ref 0–12)
SODIUM SERPL-SCNC: 141 MMOL/L (ref 136–145)
URATE SERPL-MCNC: 6.5 MG/DL (ref 3.5–7.2)
WBC # BLD AUTO: 5.3 X10(3) UL (ref 4–11)

## 2019-03-14 PROCEDURE — 85025 COMPLETE CBC W/AUTO DIFF WBC: CPT

## 2019-03-14 PROCEDURE — 36415 COLL VENOUS BLD VENIPUNCTURE: CPT

## 2019-03-14 PROCEDURE — 86225 DNA ANTIBODY NATIVE: CPT

## 2019-03-14 PROCEDURE — 84550 ASSAY OF BLOOD/URIC ACID: CPT

## 2019-03-14 PROCEDURE — 85652 RBC SED RATE AUTOMATED: CPT

## 2019-03-14 PROCEDURE — 99213 OFFICE O/P EST LOW 20 MIN: CPT | Performed by: FAMILY MEDICINE

## 2019-03-14 PROCEDURE — 86431 RHEUMATOID FACTOR QUANT: CPT

## 2019-03-14 PROCEDURE — 80053 COMPREHEN METABOLIC PANEL: CPT

## 2019-03-14 PROCEDURE — 86200 CCP ANTIBODY: CPT

## 2019-03-14 PROCEDURE — 86235 NUCLEAR ANTIGEN ANTIBODY: CPT

## 2019-03-14 PROCEDURE — 86038 ANTINUCLEAR ANTIBODIES: CPT

## 2019-03-14 NOTE — TELEPHONE ENCOUNTER
1. What are your symptoms? Can't bear weight on L knee. Swollen  No warm to the touch      2. How long have you been having these symptoms? Since last night    3. Have you done anything already to treat your symptoms?      no    ADDITIONAL INFO:   Ok t

## 2019-03-14 NOTE — PROGRESS NOTES
Cathie Ramos is a 67year old male. HPI:   Patient is a 26-year-old male who complains of acute onset of left knee pain for the past 24 hours. He states it is slightly swollen and very tender to touch. He denies any specific injury.   He denies a clarisa needed for Pain. Disp:  Rfl:    Albuterol Sulfate (PROAIR RESPICLICK) 874 (90 Base) MCG/ACT Inhalation Aerosol Powder, Breath Activated Inhale 2 puffs into the lungs every 6 (six) hours as needed (for wheezing or shortness of breath).  Disp:  Rfl:    Flutic REVIEW OF SYSTEMS:   GENERAL HEALTH: feels well otherwise  SKIN: denies any unusual skin lesions or rashes  RESPIRATORY: denies shortness of breath with exertion  CARDIOVASCULAR: denies chest pain on exertion  GI: denies abdominal pain and denies heart

## 2019-03-15 LAB
ANA SCREEN: POSITIVE
CENTROMERE AUTOAB: <100 AU/ML (ref ?–100)
DSDNA AUTOAB: <100 IU/ML (ref ?–100)
HISTONE AUTOAB: <100 AU/ML (ref ?–100)
JO-1 AUTOAB: <100 AU/ML (ref ?–100)
RNP AUTOAB: <100 AU/ML (ref ?–100)
SCL-70 AUTOAB: <100 AU/ML (ref ?–100)
SM AUTOAB (SMITH): <100 AU/ML (ref ?–100)
SSA AUTOAB: 191 AU/ML (ref ?–100)
SSB AUTOAB: <100 AU/ML (ref ?–100)

## 2019-03-16 LAB — CYCLIC CITRULLINATED PEPTIDE: 4 UNITS

## 2019-03-20 RX ORDER — METHYLPREDNISOLONE 4 MG/1
TABLET ORAL
Qty: 1 KIT | Refills: 0 | Status: SHIPPED | OUTPATIENT
Start: 2019-03-20 | End: 2019-04-05 | Stop reason: ALTCHOICE

## 2019-03-27 ENCOUNTER — TELEPHONE (OUTPATIENT)
Dept: FAMILY MEDICINE CLINIC | Facility: CLINIC | Age: 79
End: 2019-03-27

## 2019-03-27 NOTE — TELEPHONE ENCOUNTER
Pt had questions regarding medrol/prednisone. Had been on medrol 3/20 for his knee pain. It is now completed. Wondering if he should resume his original prednisone rx'd by Dr Linda Fatima? I advised he should resume this.   He said he was going to be tapered, w

## 2019-03-29 ENCOUNTER — DOCUMENTATION (OUTPATIENT)
Dept: CARDIOLOGY | Age: 79
End: 2019-03-29

## 2019-03-29 ENCOUNTER — TELEPHONE (OUTPATIENT)
Dept: CARDIOLOGY | Age: 79
End: 2019-03-29

## 2019-03-29 DIAGNOSIS — E78.00 PURE HYPERCHOLESTEROLEMIA: ICD-10-CM

## 2019-03-29 RX ORDER — ATORVASTATIN CALCIUM 40 MG/1
TABLET, FILM COATED ORAL
Qty: 90 TABLET | Refills: 1 | Status: SHIPPED | OUTPATIENT
Start: 2019-03-29 | End: 2019-01-01

## 2019-04-02 ENCOUNTER — APPOINTMENT (OUTPATIENT)
Dept: GENERAL RADIOLOGY | Age: 79
End: 2019-04-02
Attending: FAMILY MEDICINE
Payer: MEDICARE

## 2019-04-02 ENCOUNTER — TELEPHONE (OUTPATIENT)
Dept: FAMILY MEDICINE CLINIC | Facility: CLINIC | Age: 79
End: 2019-04-02

## 2019-04-02 ENCOUNTER — HOSPITAL ENCOUNTER (OUTPATIENT)
Age: 79
Discharge: HOME | End: 2019-04-02
Attending: FAMILY MEDICINE
Payer: MEDICARE

## 2019-04-02 VITALS
SYSTOLIC BLOOD PRESSURE: 130 MMHG | HEART RATE: 94 BPM | RESPIRATION RATE: 18 BRPM | HEIGHT: 73 IN | TEMPERATURE: 98 F | OXYGEN SATURATION: 93 % | WEIGHT: 215 LBS | DIASTOLIC BLOOD PRESSURE: 79 MMHG | BODY MASS INDEX: 28.49 KG/M2

## 2019-04-02 DIAGNOSIS — M25.552 LEFT HIP PAIN: Primary | ICD-10-CM

## 2019-04-02 DIAGNOSIS — S83.92XA SPRAIN OF LEFT LOWER LEG, INITIAL ENCOUNTER: ICD-10-CM

## 2019-04-02 PROBLEM — I25.10 CAD (CORONARY ARTERY DISEASE): Status: ACTIVE | Noted: 2019-04-02

## 2019-04-02 PROBLEM — Z95.2 S/P AORTIC VALVE REPLACEMENT WITH PROSTHETIC VALVE: Status: ACTIVE | Noted: 2019-04-02

## 2019-04-02 PROBLEM — I27.20 PULMONARY HYPERTENSION (CMD): Status: ACTIVE | Noted: 2018-05-15

## 2019-04-02 PROBLEM — I31.1 PERICARDITIS, CONSTRICTIVE: Status: ACTIVE | Noted: 2017-11-21

## 2019-04-02 PROCEDURE — 99204 OFFICE O/P NEW MOD 45 MIN: CPT

## 2019-04-02 PROCEDURE — 73590 X-RAY EXAM OF LOWER LEG: CPT | Performed by: FAMILY MEDICINE

## 2019-04-02 PROCEDURE — 73552 X-RAY EXAM OF FEMUR 2/>: CPT | Performed by: FAMILY MEDICINE

## 2019-04-02 PROCEDURE — 99214 OFFICE O/P EST MOD 30 MIN: CPT

## 2019-04-02 PROCEDURE — 73502 X-RAY EXAM HIP UNI 2-3 VIEWS: CPT | Performed by: FAMILY MEDICINE

## 2019-04-02 RX ORDER — MELOXICAM 7.5 MG/1
7.5 TABLET ORAL DAILY
Qty: 15 TABLET | Refills: 0 | Status: SHIPPED | OUTPATIENT
Start: 2019-04-02 | End: 2019-04-17

## 2019-04-02 NOTE — TELEPHONE ENCOUNTER
Attempt to reach pt via cell for additional info reaches voice mail. Left vmm req call back to triage nurse to discuss symptoms further. Please advise if any recommendations for now-thanks!

## 2019-04-02 NOTE — TELEPHONE ENCOUNTER
1. What are your symptoms? Pain from left knee is still there but  now pain has gone all the way up and down the left leg. Can barely put weight on it. 2. How long have you been having these symptoms? Last two days.       3. Have you done anythi

## 2019-04-02 NOTE — TELEPHONE ENCOUNTER
I spoke with with patient wife ok per hippa, recommendations given, voiced understanding, agreed with plan.

## 2019-04-02 NOTE — ED PROVIDER NOTES
Patient Seen in: [address]    History   Patient presents with:  Leg Pain    Stated Complaint: Leg Pain x 1 week    HPI    71-year-old male presents for left lower extremity pain.   Patient states he slipped and fell on the knee   • CARPAL TUNNEL RELEASE Left 4/1/2016    Performed by Ina Gutierrez MD at [address] [facility] REPAIR/REPLACEMENT N/A 1/30/2015    Performed by Ben Ascencio MD at [address] REDO N/A 2/13/201 tympanic membrane, external ear and ear canal normal.   Left Ear: Hearing, tympanic membrane, external ear and ear canal normal.   Nose: Nose normal.   Mouth/Throat: Uvula is midline, oropharynx is clear and moist and mucous membranes are normal.   Eyes: C

## 2019-04-02 NOTE — TELEPHONE ENCOUNTER
I spoke with patient, states his left knee is still swollen and painful and he now has pain in the back of his leg to the heel. No redness, no swelling not warm to touch in the leg area, no fever, pain 7/10 scale. Dr. Alberto Beyer notified.   Patient to proc

## 2019-04-02 NOTE — ED INITIAL ASSESSMENT (HPI)
Slipped on bathroom floor on 3/16/19 and njured left leg. MD saw him and prescribed a steroid/no xray done. C/o pain to left leg from the hip down to the heel. C/o difficulty walking.

## 2019-04-03 ENCOUNTER — LAB ENCOUNTER (OUTPATIENT)
Dept: LAB | Age: 79
End: 2019-04-03
Attending: FAMILY MEDICINE
Payer: MEDICARE

## 2019-04-03 DIAGNOSIS — G47.33 OSA ON CPAP: ICD-10-CM

## 2019-04-03 DIAGNOSIS — N40.0 BPH WITHOUT OBSTRUCTION/LOWER URINARY TRACT SYMPTOMS: ICD-10-CM

## 2019-04-03 DIAGNOSIS — E78.00 PURE HYPERCHOLESTEROLEMIA: ICD-10-CM

## 2019-04-03 DIAGNOSIS — I10 ESSENTIAL HYPERTENSION: ICD-10-CM

## 2019-04-03 DIAGNOSIS — Z99.89 OSA ON CPAP: ICD-10-CM

## 2019-04-03 DIAGNOSIS — I50.42 CHRONIC COMBINED SYSTOLIC AND DIASTOLIC CONGESTIVE HEART FAILURE (HCC): ICD-10-CM

## 2019-04-03 DIAGNOSIS — E03.2 HYPOTHYROIDISM DUE TO MEDICATION: ICD-10-CM

## 2019-04-03 DIAGNOSIS — E11.42 DIABETIC POLYNEUROPATHY ASSOCIATED WITH TYPE 2 DIABETES MELLITUS (HCC): ICD-10-CM

## 2019-04-03 DIAGNOSIS — J90 PLEURAL EFFUSION: ICD-10-CM

## 2019-04-03 DIAGNOSIS — E11.8 CONTROLLED TYPE 2 DIABETES MELLITUS WITH COMPLICATION, WITHOUT LONG-TERM CURRENT USE OF INSULIN (HCC): ICD-10-CM

## 2019-04-03 DIAGNOSIS — J43.8 OTHER EMPHYSEMA (HCC): ICD-10-CM

## 2019-04-03 DIAGNOSIS — Z95.2 S/P AVR (AORTIC VALVE REPLACEMENT): ICD-10-CM

## 2019-04-03 DIAGNOSIS — J84.9 ILD (INTERSTITIAL LUNG DISEASE) (HCC): ICD-10-CM

## 2019-04-03 PROCEDURE — 36415 COLL VENOUS BLD VENIPUNCTURE: CPT

## 2019-04-03 PROCEDURE — 83036 HEMOGLOBIN GLYCOSYLATED A1C: CPT

## 2019-04-03 PROCEDURE — 80053 COMPREHEN METABOLIC PANEL: CPT

## 2019-04-03 PROCEDURE — 80061 LIPID PANEL: CPT

## 2019-04-03 PROCEDURE — 84439 ASSAY OF FREE THYROXINE: CPT

## 2019-04-03 PROCEDURE — 85025 COMPLETE CBC W/AUTO DIFF WBC: CPT

## 2019-04-03 PROCEDURE — 84443 ASSAY THYROID STIM HORMONE: CPT

## 2019-04-03 PROCEDURE — 84153 ASSAY OF PSA TOTAL: CPT

## 2019-04-04 RX ORDER — ATORVASTATIN CALCIUM 40 MG/1
TABLET, FILM COATED ORAL
COMMUNITY
Start: 2018-03-21

## 2019-04-04 RX ORDER — DOCUSATE SODIUM 100 MG/1
CAPSULE, LIQUID FILLED ORAL
COMMUNITY
Start: 2015-05-26

## 2019-04-04 RX ORDER — GLIMEPIRIDE 2 MG/1
TABLET ORAL
COMMUNITY
Start: 2018-03-21

## 2019-04-04 RX ORDER — ALBUTEROL SULFATE 90 UG/1
AEROSOL, METERED RESPIRATORY (INHALATION)
COMMUNITY
Start: 2017-08-18

## 2019-04-04 RX ORDER — AZELASTINE 1 MG/ML
SPRAY, METERED NASAL
COMMUNITY
Start: 2015-05-26

## 2019-04-04 RX ORDER — AMIODARONE HYDROCHLORIDE 100 MG/1
TABLET ORAL
COMMUNITY
Start: 2018-07-16 | End: 2019-01-01 | Stop reason: SDUPTHER

## 2019-04-04 RX ORDER — BUDESONIDE AND FORMOTEROL FUMARATE DIHYDRATE 160; 4.5 UG/1; UG/1
AEROSOL RESPIRATORY (INHALATION)
COMMUNITY
Start: 2016-10-25 | End: 2019-01-01 | Stop reason: ALTCHOICE

## 2019-04-04 RX ORDER — SPIRONOLACTONE 25 MG/1
TABLET ORAL
COMMUNITY
Start: 2018-07-03 | End: 2019-07-18 | Stop reason: SDUPTHER

## 2019-04-04 RX ORDER — AMIODARONE HYDROCHLORIDE 200 MG/1
TABLET ORAL
COMMUNITY
Start: 2018-07-03 | End: 2019-04-05 | Stop reason: DRUGHIGH

## 2019-04-04 RX ORDER — IPRATROPIUM BROMIDE AND ALBUTEROL SULFATE 2.5; .5 MG/3ML; MG/3ML
SOLUTION RESPIRATORY (INHALATION)
COMMUNITY
Start: 2018-03-21

## 2019-04-04 RX ORDER — TORSEMIDE 20 MG/1
TABLET ORAL
COMMUNITY
Start: 2018-03-21

## 2019-04-04 RX ORDER — FLUTICASONE PROPIONATE 50 MCG
SPRAY, SUSPENSION (ML) NASAL
COMMUNITY
Start: 2015-05-26

## 2019-04-05 ENCOUNTER — OFFICE VISIT (OUTPATIENT)
Dept: FAMILY MEDICINE CLINIC | Facility: CLINIC | Age: 79
End: 2019-04-05
Payer: MEDICARE

## 2019-04-05 ENCOUNTER — APPOINTMENT (OUTPATIENT)
Dept: LAB | Age: 79
End: 2019-04-05
Attending: FAMILY MEDICINE
Payer: MEDICARE

## 2019-04-05 ENCOUNTER — OFFICE VISIT (OUTPATIENT)
Dept: CARDIOLOGY | Age: 79
End: 2019-04-05

## 2019-04-05 ENCOUNTER — APPOINTMENT (OUTPATIENT)
Dept: CARDIOLOGY | Age: 79
End: 2019-04-05

## 2019-04-05 VITALS
BODY MASS INDEX: 29.16 KG/M2 | SYSTOLIC BLOOD PRESSURE: 110 MMHG | WEIGHT: 220 LBS | HEART RATE: 58 BPM | HEIGHT: 73 IN | DIASTOLIC BLOOD PRESSURE: 62 MMHG

## 2019-04-05 VITALS
HEART RATE: 56 BPM | WEIGHT: 222 LBS | RESPIRATION RATE: 18 BRPM | DIASTOLIC BLOOD PRESSURE: 60 MMHG | TEMPERATURE: 97 F | BODY MASS INDEX: 29.42 KG/M2 | SYSTOLIC BLOOD PRESSURE: 108 MMHG | HEIGHT: 73 IN

## 2019-04-05 DIAGNOSIS — G47.33 OSA ON CPAP: ICD-10-CM

## 2019-04-05 DIAGNOSIS — R06.09 DYSPNEA ON EXERTION: ICD-10-CM

## 2019-04-05 DIAGNOSIS — I71.40 ABDOMINAL AORTIC ANEURYSM (AAA) WITHOUT RUPTURE (CMD): ICD-10-CM

## 2019-04-05 DIAGNOSIS — I10 ESSENTIAL HYPERTENSION: ICD-10-CM

## 2019-04-05 DIAGNOSIS — I48.20 CHRONIC ATRIAL FIBRILLATION (HCC): ICD-10-CM

## 2019-04-05 DIAGNOSIS — R32 URINARY INCONTINENCE, UNSPECIFIED TYPE: ICD-10-CM

## 2019-04-05 DIAGNOSIS — Z95.2 S/P AORTIC VALVE REPLACEMENT WITH PROSTHETIC VALVE: ICD-10-CM

## 2019-04-05 DIAGNOSIS — E78.00 PURE HYPERCHOLESTEROLEMIA: ICD-10-CM

## 2019-04-05 DIAGNOSIS — I31.1 PERICARDITIS, CONSTRICTIVE: ICD-10-CM

## 2019-04-05 DIAGNOSIS — I35.1 NONRHEUMATIC AORTIC VALVE INSUFFICIENCY: ICD-10-CM

## 2019-04-05 DIAGNOSIS — I50.42 CHRONIC COMBINED SYSTOLIC AND DIASTOLIC CONGESTIVE HEART FAILURE (HCC): ICD-10-CM

## 2019-04-05 DIAGNOSIS — I25.10 CORONARY ARTERY DISEASE INVOLVING NATIVE CORONARY ARTERY OF NATIVE HEART WITHOUT ANGINA PECTORIS: ICD-10-CM

## 2019-04-05 DIAGNOSIS — E11.8 CONTROLLED TYPE 2 DIABETES MELLITUS WITH COMPLICATION, WITHOUT LONG-TERM CURRENT USE OF INSULIN (HCC): ICD-10-CM

## 2019-04-05 DIAGNOSIS — Z99.89 OSA ON CPAP: ICD-10-CM

## 2019-04-05 DIAGNOSIS — I27.20 PULMONARY HYPERTENSION (CMD): ICD-10-CM

## 2019-04-05 DIAGNOSIS — Z95.2 S/P AVR (AORTIC VALVE REPLACEMENT): ICD-10-CM

## 2019-04-05 DIAGNOSIS — R82.90 ABNORMAL URINALYSIS: ICD-10-CM

## 2019-04-05 DIAGNOSIS — J43.8 OTHER EMPHYSEMA (HCC): ICD-10-CM

## 2019-04-05 DIAGNOSIS — E11.42 DIABETIC POLYNEUROPATHY ASSOCIATED WITH TYPE 2 DIABETES MELLITUS (HCC): ICD-10-CM

## 2019-04-05 DIAGNOSIS — E03.2 HYPOTHYROIDISM DUE TO MEDICATION: ICD-10-CM

## 2019-04-05 DIAGNOSIS — J96.11 CHRONIC RESPIRATORY FAILURE WITH HYPOXIA (HCC): ICD-10-CM

## 2019-04-05 DIAGNOSIS — N18.9 CHRONIC RENAL IMPAIRMENT, UNSPECIFIED CKD STAGE: ICD-10-CM

## 2019-04-05 DIAGNOSIS — Z00.00 ENCOUNTER FOR ANNUAL HEALTH EXAMINATION: Primary | ICD-10-CM

## 2019-04-05 DIAGNOSIS — J44.9 CHRONIC OBSTRUCTIVE PULMONARY DISEASE, UNSPECIFIED COPD TYPE (CMD): Primary | ICD-10-CM

## 2019-04-05 PROCEDURE — 87086 URINE CULTURE/COLONY COUNT: CPT

## 2019-04-05 PROCEDURE — 82570 ASSAY OF URINE CREATININE: CPT

## 2019-04-05 PROCEDURE — 99215 OFFICE O/P EST HI 40 MIN: CPT | Performed by: INTERNAL MEDICINE

## 2019-04-05 PROCEDURE — G0439 PPPS, SUBSEQ VISIT: HCPCS | Performed by: FAMILY MEDICINE

## 2019-04-05 PROCEDURE — 99213 OFFICE O/P EST LOW 20 MIN: CPT | Performed by: FAMILY MEDICINE

## 2019-04-05 PROCEDURE — 81003 URINALYSIS AUTO W/O SCOPE: CPT | Performed by: FAMILY MEDICINE

## 2019-04-05 PROCEDURE — 82043 UR ALBUMIN QUANTITATIVE: CPT

## 2019-04-05 RX ORDER — SULFAMETHOXAZOLE AND TRIMETHOPRIM 800; 160 MG/1; MG/1
1 TABLET ORAL 2 TIMES DAILY
Qty: 20 TABLET | Refills: 1 | Status: SHIPPED | OUTPATIENT
Start: 2019-04-05 | End: 2019-04-15

## 2019-04-05 RX ORDER — GABAPENTIN 300 MG/1
300 CAPSULE ORAL 4 TIMES DAILY
COMMUNITY

## 2019-04-05 RX ORDER — METHYLPREDNISOLONE 4 MG/1
4 TABLET ORAL DAILY
COMMUNITY
Start: 2019-03-20 | End: 2019-01-01 | Stop reason: ALTCHOICE

## 2019-04-05 RX ORDER — MYCOPHENOLATE MOFETIL 500 MG/1
500 TABLET ORAL 4 TIMES DAILY
COMMUNITY
Start: 2019-03-29

## 2019-04-05 RX ORDER — PREDNISONE 1 MG/1
2 TABLET ORAL 2 TIMES DAILY
COMMUNITY
Start: 2019-02-12 | End: 2019-05-13

## 2019-04-05 RX ORDER — AZELASTINE 1 MG/ML
SPRAY, METERED NASAL 2 TIMES DAILY PRN
COMMUNITY

## 2019-04-05 RX ORDER — LEVOTHYROXINE SODIUM 0.03 MG/1
25 TABLET ORAL DAILY
COMMUNITY

## 2019-04-05 RX ORDER — GABAPENTIN 300 MG/1
CAPSULE ORAL
Qty: 720 CAPSULE | Refills: 1 | Status: SHIPPED | OUTPATIENT
Start: 2019-04-05 | End: 2019-01-01

## 2019-04-05 RX ORDER — LEVOTHYROXINE SODIUM 0.05 MG/1
50 TABLET ORAL
Qty: 90 TABLET | Refills: 1 | Status: SHIPPED | OUTPATIENT
Start: 2019-04-05 | End: 2019-07-23 | Stop reason: DRUGHIGH

## 2019-04-05 NOTE — PROGRESS NOTES
HPI:   Cathie Ramos is a 67year old male who presents for a Medicare Subsequent Annual Wellness visit (Pt already had Initial Annual Wellness). Cathie Ramos is a 67year old male who presents for recheck of hyperlipidemia.  Patient reports rosy 1-Yes  Do you have difficulty seeing?: (P) 0-No  Do you have any difficulty walking or getting up?: (P) 1-Yes  Do you have any tripping hazards?: (P) 0-No  Are you on multiple medications?: (P) 1-Yes  Does pain affect your day to day activities?: (P) 0-No screening of functional status. Problems with daily activities? : (P) Yes   He has problems with Memory based on screening of functional status.    Memory Problems?: (P) Yes       Depression Screening (PHQ-2/PHQ-9): Over the LAST 2 WEEKS         Advanced Restrictive pericarditis     Diabetic polyneuropathy associated with type 2 diabetes mellitus (HCC)     Sensory hearing loss, bilateral     Mild pulmonary hypertension (HCC)     Chronic combined systolic and diastolic congestive heart failure (HCC)     Chr TAKE 1 TABLET BY MOUTH DAILY   MYCOPHENOLATE MOFETIL 500 MG Oral Tab TAKE 2 TABLETS(1000 MG) BY MOUTH TWICE DAILY   TORSEMIDE 20 MG Oral Tab TAKE ONE TABLET BY MOUTH TWICE DAILY( EVERY MORNING AND EVERY EVENING)   predniSONE 1 MG Oral Tab Take 4 tablets (4 of breath, Sleep apnea, Type 2 diabetes mellitus ([facility] [address] Unspecified intestinal obstruction, and Visual impairment.     He  has a past surgical history that includes cholecystectomy (1980); hernia surgery (2006); arthroscopy of joint unlisted (Left, 1994); o Dose 65 yr and older (39967) 11/16/2017, 10/11/2018   • HIGH DOSE FLU 65 YRS AND OLDER PRSV FREE SINGLE D (33002) FLU CLINIC 09/26/2016   • Pneumococcal (Prevnar 13) 09/26/2016   • Pneumovax 23 10/23/2008   • TDAP 12/18/2017   • Zoster Vaccine Live Nile [place] VISIT,EST,LEVL III  -     CBC WITH DIFFERENTIAL WITH PLATELET;  Future  - Continue torsemide 20 mg 1 tablet twice daily  - Continue Spironolactone 1 tablet daily    Chronic atrial fibrillation (HCC)  -     OFFICE/OUTPT VISIT,EST,LEVL III  -     CBC WITH DIF Diabetes Screening      HbgA1C   Annually HEMOGLOBIN A1c (% of total Hgb)   Date Value   11/18/2015 6.5 (H)     HgbA1C (%)   Date Value   04/03/2019 6.8 (H)       No flowsheet data found.     Fasting Blood Sugar (FSB)Annually Glucose (mg/dL)   Date Value virus carrier   Homosexual men   Illicit injectable drug abusers     Tetanus Toxoid  Only covered with a cut with metal- TD and TDaP Not covered by Medicare Part B) No vaccine history found This may be covered with your prescription benefits, but Medicare

## 2019-04-08 DIAGNOSIS — E11.8 CONTROLLED TYPE 2 DIABETES MELLITUS WITH COMPLICATION, WITHOUT LONG-TERM CURRENT USE OF INSULIN (HCC): Primary | ICD-10-CM

## 2019-04-10 ENCOUNTER — TELEPHONE (OUTPATIENT)
Dept: FAMILY MEDICINE CLINIC | Facility: CLINIC | Age: 79
End: 2019-04-10

## 2019-04-11 NOTE — PATIENT INSTRUCTIONS
[facility] Replaced by [facility]'s SCREENING SCHEDULE   Tests on this list are recommended by your physician but may not be covered, or covered at this frequency, by your insurer. Please check with your insurance carrier before scheduling to verify coverage.     Yanci Reese No results found for this or any previous visit.  Limited to patients who meet one of the following criteria:   • Men who are 73-68 years old and have smoked more than 100 cigarettes in their lifetime   • Anyone with a family history    Colorectal Cancer S previous visit.  Medium/high risk factors:   End-stage renal disease   Hemophiliacs who received Factor VIII or IX concentrates   Clients of institutions for the mentally retarded   Persons who live in the same house as a HepB virus carrier   Homosexual men

## 2019-04-11 NOTE — TELEPHONE ENCOUNTER
Please call the patient and tell him his TSH was 77. What dose of levothyroxine has been taking? Has he missed any doses?

## 2019-05-03 ENCOUNTER — APPOINTMENT (RX ONLY)
Dept: URBAN - METROPOLITAN AREA CLINIC 153 | Facility: CLINIC | Age: 79
Setting detail: DERMATOLOGY
End: 2019-05-03

## 2019-05-03 DIAGNOSIS — L98419 CHRONIC ULCER OF OTHER SPECIFIED SITES: ICD-10-CM

## 2019-05-03 DIAGNOSIS — L98429 CHRONIC ULCER OF OTHER SPECIFIED SITES: ICD-10-CM

## 2019-05-03 PROBLEM — M12.9 ARTHROPATHY, UNSPECIFIED: Status: ACTIVE | Noted: 2019-05-03

## 2019-05-03 PROBLEM — J44.9 CHRONIC OBSTRUCTIVE PULMONARY DISEASE, UNSPECIFIED: Status: ACTIVE | Noted: 2019-05-03

## 2019-05-03 PROBLEM — I51.9 HEART DISEASE, UNSPECIFIED: Status: ACTIVE | Noted: 2019-05-03

## 2019-05-03 PROBLEM — L85.3 XEROSIS CUTIS: Status: ACTIVE | Noted: 2019-05-03

## 2019-05-03 PROBLEM — I10 ESSENTIAL (PRIMARY) HYPERTENSION: Status: ACTIVE | Noted: 2019-05-03

## 2019-05-03 PROBLEM — E05.90 THYROTOXICOSIS, UNSPECIFIED WITHOUT THYROTOXIC CRISIS OR STORM: Status: ACTIVE | Noted: 2019-05-03

## 2019-05-03 PROBLEM — E13.9 OTHER SPECIFIED DIABETES MELLITUS WITHOUT COMPLICATIONS: Status: ACTIVE | Noted: 2019-05-03

## 2019-05-03 PROBLEM — R23.3 SPONTANEOUS ECCHYMOSES: Status: ACTIVE | Noted: 2019-05-03

## 2019-05-03 PROBLEM — L97.319 NON-PRESSURE CHRONIC ULCER OF RIGHT ANKLE WITH UNSPECIFIED SEVERITY: Status: ACTIVE | Noted: 2019-05-03

## 2019-05-03 PROCEDURE — ? PRESCRIPTION

## 2019-05-03 PROCEDURE — ? TREATMENT REGIMEN

## 2019-05-03 PROCEDURE — 99201: CPT

## 2019-05-03 PROCEDURE — ? COUNSELING

## 2019-05-03 RX ORDER — SILVER SULFADIAZINE 10 MG/G
1 CREAM TOPICAL BID
Qty: 1 | Refills: 2 | Status: ERX | COMMUNITY
Start: 2019-05-03

## 2019-05-03 RX ADMIN — SILVER SULFADIAZINE 1: 10 CREAM TOPICAL at 17:10

## 2019-05-03 ASSESSMENT — LOCATION DETAILED DESCRIPTION DERM: LOCATION DETAILED: RIGHT POSTERIOR LATERAL MALLEOLUS

## 2019-05-03 ASSESSMENT — LOCATION ZONE DERM: LOCATION ZONE: LEG

## 2019-05-03 ASSESSMENT — LOCATION SIMPLE DESCRIPTION DERM: LOCATION SIMPLE: RIGHT ANKLE

## 2019-05-03 NOTE — PROCEDURE: TREATMENT REGIMEN
Initiate Treatment: 2.0 x 2.0 cm punched out wound present. Plan to treat with Silvadene cream, apply TID to wound on right ankle.
Detail Level: Detailed
Discontinue Regimen: Neosporin.

## 2019-05-14 DIAGNOSIS — E11.8 CONTROLLED TYPE 2 DIABETES MELLITUS WITH COMPLICATION, WITHOUT LONG-TERM CURRENT USE OF INSULIN (HCC): ICD-10-CM

## 2019-05-14 DIAGNOSIS — E11.42 DIABETIC POLYNEUROPATHY ASSOCIATED WITH TYPE 2 DIABETES MELLITUS (HCC): ICD-10-CM

## 2019-05-14 RX ORDER — GLIMEPIRIDE 1 MG/1
TABLET ORAL
Qty: 90 TABLET | Refills: 0 | Status: SHIPPED | OUTPATIENT
Start: 2019-05-14 | End: 2019-07-18

## 2019-06-05 ENCOUNTER — TELEPHONE (OUTPATIENT)
Dept: FAMILY MEDICINE CLINIC | Facility: CLINIC | Age: 79
End: 2019-06-05

## 2019-06-05 RX ORDER — BLOOD-GLUCOSE METER
1 EACH MISCELLANEOUS DAILY
Qty: 1 KIT | Refills: 0 | Status: SHIPPED | OUTPATIENT
Start: 2019-06-05 | End: 2019-06-21

## 2019-06-07 DIAGNOSIS — J90 PLEURAL EFFUSION: ICD-10-CM

## 2019-06-07 DIAGNOSIS — I50.42 CHRONIC COMBINED SYSTOLIC AND DIASTOLIC CONGESTIVE HEART FAILURE (HCC): ICD-10-CM

## 2019-06-07 RX ORDER — TORSEMIDE 20 MG/1
TABLET ORAL
Qty: 180 TABLET | Refills: 0 | Status: SHIPPED | OUTPATIENT
Start: 2019-06-07 | End: 2019-06-14

## 2019-06-14 DIAGNOSIS — I50.42 CHRONIC COMBINED SYSTOLIC AND DIASTOLIC CONGESTIVE HEART FAILURE (HCC): ICD-10-CM

## 2019-06-14 DIAGNOSIS — J90 PLEURAL EFFUSION: ICD-10-CM

## 2019-06-14 RX ORDER — TORSEMIDE 20 MG/1
TABLET ORAL
Qty: 180 TABLET | Refills: 0 | Status: SHIPPED | OUTPATIENT
Start: 2019-06-14 | End: 2019-09-09

## 2019-06-14 NOTE — TELEPHONE ENCOUNTER
he wants sent to [place] pharmacy-I did confirm he did not  the #180 from [place]. They will cancel that order. Please approve.

## 2019-06-14 NOTE — TELEPHONE ENCOUNTER
Per patient TORSEMIDE 20 MG Oral Tab was sent to wrong Pharmacy. Pharmacy will not transfer. Needs 90 day supply. Please sent to [address], [address] [place] [address], [phone], [phone].

## 2019-06-21 ENCOUNTER — MED REC SCAN ONLY (OUTPATIENT)
Dept: FAMILY MEDICINE CLINIC | Facility: CLINIC | Age: 79
End: 2019-06-21

## 2019-06-21 RX ORDER — BLOOD-GLUCOSE METER
1 EACH MISCELLANEOUS DAILY
Qty: 1 KIT | Refills: 0 | Status: SHIPPED | OUTPATIENT
Start: 2019-06-21

## 2019-06-29 DIAGNOSIS — E11.8 CONTROLLED TYPE 2 DIABETES MELLITUS WITH COMPLICATION, WITHOUT LONG-TERM CURRENT USE OF INSULIN (HCC): ICD-10-CM

## 2019-06-29 DIAGNOSIS — E11.42 DIABETIC POLYNEUROPATHY ASSOCIATED WITH TYPE 2 DIABETES MELLITUS (HCC): ICD-10-CM

## 2019-07-01 RX ORDER — GLIMEPIRIDE 1 MG/1
TABLET ORAL
Qty: 90 TABLET | Refills: 0 | OUTPATIENT
Start: 2019-07-01

## 2019-07-03 ENCOUNTER — MED REC SCAN ONLY (OUTPATIENT)
Dept: FAMILY MEDICINE CLINIC | Facility: CLINIC | Age: 79
End: 2019-07-03

## 2019-07-05 ENCOUNTER — TELEPHONE (OUTPATIENT)
Dept: FAMILY MEDICINE CLINIC | Facility: CLINIC | Age: 79
End: 2019-07-05

## 2019-07-05 NOTE — TELEPHONE ENCOUNTER
Patient is having issues getting the following med. GLIMEPIRIDE 1 MG Oral Tab    It was ordered in [place]  [phone] phone    They won't fill it because \"too many scripts too close together\"    Please Advise. Thank you.

## 2019-07-05 NOTE — TELEPHONE ENCOUNTER
Patient informed that Rx sent 5/14/19 for 90 count. He needs to have [facility] [place] [facility].

## 2019-07-18 DIAGNOSIS — E11.42 DIABETIC POLYNEUROPATHY ASSOCIATED WITH TYPE 2 DIABETES MELLITUS (HCC): ICD-10-CM

## 2019-07-18 DIAGNOSIS — E11.8 CONTROLLED TYPE 2 DIABETES MELLITUS WITH COMPLICATION, WITHOUT LONG-TERM CURRENT USE OF INSULIN (HCC): ICD-10-CM

## 2019-07-18 RX ORDER — GLIMEPIRIDE 1 MG/1
TABLET ORAL
Qty: 90 TABLET | Refills: 0 | Status: SHIPPED | OUTPATIENT
Start: 2019-07-18 | End: 2019-10-05

## 2019-07-19 RX ORDER — SPIRONOLACTONE 25 MG/1
TABLET ORAL
Qty: 90 TABLET | Refills: 0 | Status: SHIPPED | OUTPATIENT
Start: 2019-07-19 | End: 2019-10-05 | Stop reason: SDUPTHER

## 2019-07-22 ENCOUNTER — TELEPHONE (OUTPATIENT)
Dept: FAMILY MEDICINE CLINIC | Facility: CLINIC | Age: 79
End: 2019-07-22

## 2019-07-22 NOTE — TELEPHONE ENCOUNTER
Labs faxed from [address] [place] [address] in [facility]. Dr. Vasquez Falling patient. TSH is still elevated at 29.2. Free T4 is 0.7. Increase levothyroxine to 88mcg once daily. Recheck TSH and Free T4 in 8 weeks. Update medication list.  Thanks. Labs sent to scan.

## 2019-07-22 NOTE — TELEPHONE ENCOUNTER
Call to pt's home (preferred contact # in snapshot) reaches identified voice mail. Left vmm req call back to triage nurse tomorrow for test results/instructions. Advised will also try his cell-if we do not connect, req call back to triage nurse tomorrow.

## 2019-07-23 DIAGNOSIS — E03.2 HYPOTHYROIDISM DUE TO MEDICATION: Primary | ICD-10-CM

## 2019-07-23 RX ORDER — LEVOTHYROXINE SODIUM 88 UG/1
88 CAPSULE ORAL
Qty: 90 CAPSULE | Refills: 0 | Status: SHIPPED | OUTPATIENT
Start: 2019-07-23 | End: 2019-07-25

## 2019-07-23 NOTE — TELEPHONE ENCOUNTER
Incoming call from patient, information and recommendations discussed with patient, voiced understanding, agreed with plan. None

## 2019-07-25 RX ORDER — LEVOTHYROXINE SODIUM 88 UG/1
88 TABLET ORAL
Qty: 90 TABLET | Refills: 0 | Status: SHIPPED | OUTPATIENT
Start: 2019-07-25 | End: 2019-01-01

## 2019-08-14 ENCOUNTER — OFFICE VISIT (OUTPATIENT)
Dept: WOUND CARE | Facility: HOSPITAL | Age: 79
End: 2019-08-14
Attending: INTERNAL MEDICINE
Payer: MEDICARE

## 2019-08-14 DIAGNOSIS — L97.509 DIABETIC FOOT ULCER (HCC): ICD-10-CM

## 2019-08-14 DIAGNOSIS — S91.001A UNSPECIFIED OPEN WOUND, RIGHT ANKLE, INITIAL ENCOUNTER: ICD-10-CM

## 2019-08-14 DIAGNOSIS — E11.621 DIABETIC FOOT ULCER (HCC): ICD-10-CM

## 2019-08-14 DIAGNOSIS — E11.8 CONTROLLED TYPE 2 DIABETES MELLITUS WITH COMPLICATION, WITHOUT LONG-TERM CURRENT USE OF INSULIN (HCC): Primary | ICD-10-CM

## 2019-08-14 LAB — GLUCOSE BLD-MCNC: 241 MG/DL (ref 70–99)

## 2019-08-14 PROCEDURE — 99214 OFFICE O/P EST MOD 30 MIN: CPT

## 2019-08-14 PROCEDURE — 82962 GLUCOSE BLOOD TEST: CPT

## 2019-08-14 PROCEDURE — 11042 DBRDMT SUBQ TIS 1ST 20SQCM/<: CPT

## 2019-08-15 NOTE — PROGRESS NOTES
Chief Complaint  This information was obtained from the patient  Patient is here for an initial consult. He presents with wounds on his lower legs. He believes that it was caused by physical therapy and his ankle rubbing against the shoes.      Allergies patient  Patient has a medical history of:  Peripheral vascular disease  Hypertension  Hyperlipidemia  COPD  Obstructive sleep apnea  Pleural effusion  Type II diabetes  Hypothyroidism  Congestive heart disease  Pericarditis  Renal disorder  Neuropathy mg/dl.     Physical Exam Notes   AAOX3 - PLEASANT NO DISTRESS - EMILY EOMI - NO JVD - MUCOSA MOIST; hEART RATE OK - S1 S2 NORMAL - LUNGS CLEAR AE GOOD - ABDOMEN SOFT BS + NEURO NONFOCAL; WOUNDS AS DESCRIBED.      Integumentary (Hair, Skin)  Wound #1 Left, La Used: Ace Wrap  Calf Measurement 32 cm from heel with left measurement of 29.5 cm  Ankle Measurement 10 cm from heel with left measurement of 22 cm  Right Extremity: Edema is present  Compression Device In Use: Yes  Device Used Correctly: Yes  Compression A moderate amount of bleeding was controlled with pressure. The procedure was tolerated well with a pain level of 0 throughout and a pain level of 0 following the procedure.  Post Debridement Measurements: 0.9cm length x 0.5cm width x 0.4cm depth; with an a Lidocaine    Wound Cleansing & Dressings  May shower with protection. Cleanse with saline or wound cleanser  Collagen - endoform  Hydrofera Transfer   Kerlix  Change dressing 3x/week    Compression Therapy:  Tubigrip  Compression to Right Leg.   Compressio

## 2019-08-19 PROBLEM — I31.1 PERICARDITIS, CONSTRICTIVE: Status: ACTIVE | Noted: 2017-11-21

## 2019-08-19 PROBLEM — I25.10 ATHEROSCLEROSIS OF CORONARY ARTERY: Status: ACTIVE | Noted: 2019-04-02

## 2019-08-19 PROBLEM — I27.20 PULMONARY HYPERTENSION (HCC): Status: ACTIVE | Noted: 2017-03-01

## 2019-08-21 ENCOUNTER — OFFICE VISIT (OUTPATIENT)
Dept: WOUND CARE | Facility: HOSPITAL | Age: 79
End: 2019-08-21
Attending: INTERNAL MEDICINE
Payer: MEDICARE

## 2019-08-21 DIAGNOSIS — E11.8 CONTROLLED TYPE 2 DIABETES MELLITUS WITH COMPLICATION, WITHOUT LONG-TERM CURRENT USE OF INSULIN (HCC): Primary | ICD-10-CM

## 2019-08-21 DIAGNOSIS — L97.509 DIABETIC FOOT ULCER (HCC): ICD-10-CM

## 2019-08-21 DIAGNOSIS — E11.621 DIABETIC FOOT ULCER (HCC): ICD-10-CM

## 2019-08-21 PROCEDURE — 82962 GLUCOSE BLOOD TEST: CPT

## 2019-08-21 PROCEDURE — 11042 DBRDMT SUBQ TIS 1ST 20SQCM/<: CPT

## 2019-08-21 NOTE — PROGRESS NOTES
Chief Complaint  This information was obtained from the patient  Patient is here for a follow up for bilateral ankle. Patients denies pain on the wound.  Patients took off the spanda- after shower    Allergies  Iodinated Contrast- Oral and IV Dye Muriel Blanchard Blood Pressure: 120/78 mmHg, Capillary Blood Glucose: 124 mg/dl. Integumentary (Hair, Skin)  Wound #1 Left, Lateral Ankle is a chronic Douglas Grade 1 Diabetic Ulcer and has received a status of Not Healed. Subsequent wound encounter measurements are 0. 22.5 cm  Right Extremity: Edema is present  Compression Device In Use: Yes  Device Used Correctly: Yes  Compression Device Used:  Ace Wrap  Calf Measurement 32 cm from heel with right measurement of 31.5 cm  Ankle Measurement 10 cm from heel with right abiola 0.3 sq cm and a volume of 0.12 cubic cm; Wound #2  Wound #2 (Diabetic Ulcer) is located on the right, lateral ankle.  A skin/subcutaneous tissue level excisional/surgical debridement with a total area debrided of 0.99 sq cm was performed by Hortensia Marroquin Leg.  Compression to Left Leg  Avoid prolonged standing in one place. Elevate leg(s)as much as possible. Off-Loading  Pressure relief shoe / inserts / foams    Additional Orders: Follow-Up Appointments  Return Appointment in 1 week.    Please schedul

## 2019-08-22 LAB — GLUCOSE BLD-MCNC: 124 MG/DL (ref 70–99)

## 2019-08-23 ENCOUNTER — LAB ENCOUNTER (OUTPATIENT)
Dept: LAB | Age: 79
End: 2019-08-23
Attending: FAMILY MEDICINE
Payer: MEDICARE

## 2019-08-23 ENCOUNTER — HOSPITAL ENCOUNTER (OUTPATIENT)
Dept: CT IMAGING | Facility: HOSPITAL | Age: 79
Discharge: HOME | End: 2019-08-23
Attending: INTERNAL MEDICINE
Payer: MEDICARE

## 2019-08-23 DIAGNOSIS — E03.2 HYPOTHYROIDISM DUE TO MEDICATION: ICD-10-CM

## 2019-08-23 DIAGNOSIS — J96.11 CHRONIC RESPIRATORY FAILURE WITH HYPOXIA (HCC): ICD-10-CM

## 2019-08-23 DIAGNOSIS — I48.20 CHRONIC ATRIAL FIBRILLATION (HCC): ICD-10-CM

## 2019-08-23 DIAGNOSIS — I10 ESSENTIAL HYPERTENSION: ICD-10-CM

## 2019-08-23 DIAGNOSIS — E78.00 PURE HYPERCHOLESTEROLEMIA: ICD-10-CM

## 2019-08-23 DIAGNOSIS — R09.1 PLEURITIS: ICD-10-CM

## 2019-08-23 DIAGNOSIS — E11.8 CONTROLLED TYPE 2 DIABETES MELLITUS WITH COMPLICATION, WITHOUT LONG-TERM CURRENT USE OF INSULIN (HCC): ICD-10-CM

## 2019-08-23 DIAGNOSIS — I50.42 CHRONIC COMBINED SYSTOLIC AND DIASTOLIC CONGESTIVE HEART FAILURE (HCC): ICD-10-CM

## 2019-08-23 DIAGNOSIS — E11.42 DIABETIC POLYNEUROPATHY ASSOCIATED WITH TYPE 2 DIABETES MELLITUS (HCC): ICD-10-CM

## 2019-08-23 LAB
ALBUMIN SERPL-MCNC: 3.7 G/DL (ref 3.4–5)
ALBUMIN/GLOB SERPL: 1 {RATIO} (ref 1–2)
ALP LIVER SERPL-CCNC: 93 U/L (ref 45–117)
ALT SERPL-CCNC: 15 U/L (ref 16–61)
ANION GAP SERPL CALC-SCNC: 4 MMOL/L (ref 0–18)
AST SERPL-CCNC: 10 U/L (ref 15–37)
BASOPHILS # BLD AUTO: 0.03 X10(3) UL (ref 0–0.2)
BASOPHILS NFR BLD AUTO: 0.7 %
BILIRUB SERPL-MCNC: 0.5 MG/DL (ref 0.1–2)
BUN BLD-MCNC: 22 MG/DL (ref 7–18)
BUN/CREAT SERPL: 20.4 (ref 10–20)
CALCIUM BLD-MCNC: 9.2 MG/DL (ref 8.5–10.1)
CHLORIDE SERPL-SCNC: 100 MMOL/L (ref 98–112)
CHOLEST SMN-MCNC: 130 MG/DL (ref ?–200)
CO2 SERPL-SCNC: 35 MMOL/L (ref 21–32)
CREAT BLD-MCNC: 1.08 MG/DL (ref 0.7–1.3)
CREAT UR-SCNC: 23.2 MG/DL
DEPRECATED RDW RBC AUTO: 46.9 FL (ref 35.1–46.3)
EOSINOPHIL # BLD AUTO: 0.11 X10(3) UL (ref 0–0.7)
EOSINOPHIL NFR BLD AUTO: 2.5 %
ERYTHROCYTE [DISTWIDTH] IN BLOOD BY AUTOMATED COUNT: 13.3 % (ref 11–15)
EST. AVERAGE GLUCOSE BLD GHB EST-MCNC: 151 MG/DL (ref 68–126)
GLOBULIN PLAS-MCNC: 3.7 G/DL (ref 2.8–4.4)
GLUCOSE BLD-MCNC: 150 MG/DL (ref 70–99)
HBA1C MFR BLD HPLC: 6.9 % (ref ?–5.7)
HCT VFR BLD AUTO: 45.7 % (ref 39–53)
HDLC SERPL-MCNC: 36 MG/DL (ref 40–59)
HGB BLD-MCNC: 14.1 G/DL (ref 13–17.5)
IMM GRANULOCYTES # BLD AUTO: 0.02 X10(3) UL (ref 0–1)
IMM GRANULOCYTES NFR BLD: 0.5 %
LDLC SERPL CALC-MCNC: 65 MG/DL (ref ?–100)
LYMPHOCYTES # BLD AUTO: 0.56 X10(3) UL (ref 1–4)
LYMPHOCYTES NFR BLD AUTO: 12.8 %
M PROTEIN MFR SERPL ELPH: 7.4 G/DL (ref 6.4–8.2)
MCH RBC QN AUTO: 29.6 PG (ref 26–34)
MCHC RBC AUTO-ENTMCNC: 30.9 G/DL (ref 31–37)
MCV RBC AUTO: 95.8 FL (ref 80–100)
MICROALBUMIN UR-MCNC: 0.78 MG/DL
MICROALBUMIN/CREAT 24H UR-RTO: 33.6 UG/MG (ref ?–30)
MONOCYTES # BLD AUTO: 0.33 X10(3) UL (ref 0.1–1)
MONOCYTES NFR BLD AUTO: 7.5 %
NEUTROPHILS # BLD AUTO: 3.33 X10 (3) UL (ref 1.5–7.7)
NEUTROPHILS # BLD AUTO: 3.33 X10(3) UL (ref 1.5–7.7)
NEUTROPHILS NFR BLD AUTO: 76 %
NONHDLC SERPL-MCNC: 94 MG/DL (ref ?–130)
OSMOLALITY SERPL CALC.SUM OF ELEC: 294 MOSM/KG (ref 275–295)
PLATELET # BLD AUTO: 155 10(3)UL (ref 150–450)
POTASSIUM SERPL-SCNC: 4.4 MMOL/L (ref 3.5–5.1)
RBC # BLD AUTO: 4.77 X10(6)UL (ref 3.8–5.8)
SODIUM SERPL-SCNC: 139 MMOL/L (ref 136–145)
T4 FREE SERPL-MCNC: 1.1 NG/DL (ref 0.8–1.7)
TRIGL SERPL-MCNC: 144 MG/DL (ref 30–149)
TSI SER-ACNC: 13.6 MIU/ML (ref 0.36–3.74)
VLDLC SERPL CALC-MCNC: 29 MG/DL (ref 0–30)
WBC # BLD AUTO: 4.4 X10(3) UL (ref 4–11)

## 2019-08-23 PROCEDURE — 82570 ASSAY OF URINE CREATININE: CPT

## 2019-08-23 PROCEDURE — 80061 LIPID PANEL: CPT

## 2019-08-23 PROCEDURE — 71250 CT THORAX DX C-: CPT | Performed by: INTERNAL MEDICINE

## 2019-08-23 PROCEDURE — 84443 ASSAY THYROID STIM HORMONE: CPT

## 2019-08-23 PROCEDURE — 82043 UR ALBUMIN QUANTITATIVE: CPT

## 2019-08-23 PROCEDURE — 83036 HEMOGLOBIN GLYCOSYLATED A1C: CPT

## 2019-08-23 PROCEDURE — 80053 COMPREHEN METABOLIC PANEL: CPT

## 2019-08-23 PROCEDURE — 84439 ASSAY OF FREE THYROXINE: CPT

## 2019-08-23 PROCEDURE — 36415 COLL VENOUS BLD VENIPUNCTURE: CPT

## 2019-08-23 PROCEDURE — 85025 COMPLETE CBC W/AUTO DIFF WBC: CPT

## 2019-08-27 ENCOUNTER — OFFICE VISIT (OUTPATIENT)
Dept: FAMILY MEDICINE CLINIC | Facility: CLINIC | Age: 79
End: 2019-08-27
Payer: MEDICARE

## 2019-08-27 VITALS
WEIGHT: 212 LBS | RESPIRATION RATE: 16 BRPM | TEMPERATURE: 97 F | HEIGHT: 73 IN | BODY MASS INDEX: 28.1 KG/M2 | DIASTOLIC BLOOD PRESSURE: 60 MMHG | SYSTOLIC BLOOD PRESSURE: 104 MMHG | HEART RATE: 68 BPM

## 2019-08-27 DIAGNOSIS — I48.20 CHRONIC ATRIAL FIBRILLATION (HCC): ICD-10-CM

## 2019-08-27 DIAGNOSIS — E78.00 PURE HYPERCHOLESTEROLEMIA: ICD-10-CM

## 2019-08-27 DIAGNOSIS — G62.9 NEUROPATHY: ICD-10-CM

## 2019-08-27 DIAGNOSIS — E03.2 HYPOTHYROIDISM DUE TO MEDICATION: ICD-10-CM

## 2019-08-27 DIAGNOSIS — J43.8 OTHER EMPHYSEMA (HCC): ICD-10-CM

## 2019-08-27 DIAGNOSIS — I31.8 RESTRICTIVE PERICARDITIS: ICD-10-CM

## 2019-08-27 DIAGNOSIS — I50.42 CHRONIC COMBINED SYSTOLIC AND DIASTOLIC CONGESTIVE HEART FAILURE (HCC): ICD-10-CM

## 2019-08-27 DIAGNOSIS — E11.8 CONTROLLED TYPE 2 DIABETES MELLITUS WITH COMPLICATION, WITHOUT LONG-TERM CURRENT USE OF INSULIN (HCC): Primary | ICD-10-CM

## 2019-08-27 DIAGNOSIS — Z99.89 OSA ON CPAP: ICD-10-CM

## 2019-08-27 DIAGNOSIS — I10 ESSENTIAL HYPERTENSION: ICD-10-CM

## 2019-08-27 DIAGNOSIS — G47.33 OSA ON CPAP: ICD-10-CM

## 2019-08-27 PROCEDURE — 99214 OFFICE O/P EST MOD 30 MIN: CPT | Performed by: FAMILY MEDICINE

## 2019-08-27 NOTE — PROGRESS NOTES
Teressa Ponce is a 67year old male. HPI:   Teressa Ponce is a 67year old male who presents for recheck of hyperlipidemia. Patient reports taking medications as instructed, no medication side effects noted. Denies any generalized muscle aches.   His Propionate 50 MCG/ACT Nasal Suspension by Each Nare route as needed. Disp:  Rfl:    Azelastine HCl 0.1 % Nasal Solution by Nasal route 2 (two) times daily.  Disp:  Rfl:    gabapentin 300 MG Oral Cap TAKE 4 CAPSULES BY MOUTH TWICE DAILY Disp: 720 capsule R palpitations    • High cholesterol    • Hyperlipidemia LDL goal <70    • Hypertension, essential, benign    • Leaking of urine    • Loss of appetite    • Microalbuminuria due to type 2 diabetes mellitus (HCC)    • Neuropathy     hands, legs, feet   • Pain auscultation  CARDIO: RRR without murmur  GI: good BS's,no masses, HSM or tenderness  EXTREMITIES: no cyanosis, clubbing or edema    ASSESSMENT AND PLAN:   1.  Controlled type 2 diabetes mellitus with complication, without long-term current use of insulin (

## 2019-08-28 ENCOUNTER — OFFICE VISIT (OUTPATIENT)
Dept: WOUND CARE | Facility: HOSPITAL | Age: 79
End: 2019-08-28
Attending: INTERNAL MEDICINE
Payer: MEDICARE

## 2019-08-28 DIAGNOSIS — E11.621 DIABETIC FOOT ULCER (HCC): ICD-10-CM

## 2019-08-28 DIAGNOSIS — L97.509 DIABETIC FOOT ULCER (HCC): ICD-10-CM

## 2019-08-28 DIAGNOSIS — E11.8 CONTROLLED TYPE 2 DIABETES MELLITUS WITH COMPLICATION, WITHOUT LONG-TERM CURRENT USE OF INSULIN (HCC): Primary | ICD-10-CM

## 2019-08-28 LAB — GLUCOSE BLD-MCNC: 191 MG/DL (ref 70–99)

## 2019-08-28 PROCEDURE — 29581 APPL MULTLAYER CMPRN SYS LEG: CPT

## 2019-08-28 PROCEDURE — 82962 GLUCOSE BLOOD TEST: CPT

## 2019-08-30 ENCOUNTER — MED REC SCAN ONLY (OUTPATIENT)
Dept: FAMILY MEDICINE CLINIC | Facility: CLINIC | Age: 79
End: 2019-08-30

## 2019-08-30 NOTE — PROGRESS NOTES
Chief Complaint  This information was obtained from the patient  Patient is here for a wound care follow up. He denies pain to the wounds, but is having pain to his feet.      Allergies  Iodinated Contrast- Oral and IV Dye (Reaction: Tightness in throat) Pulse: 72 bpm, Respiratory Rate: 16 breaths/min, Blood Pressure: 104/71 mmHg, Capillary Blood Glucose: 191 mg/dl.      Integumentary (Hair, Skin)  Wound #1 Left, Lateral Ankle is a chronic Douglas Grade 1 Diabetic Ulcer and has received a status of Not Heale heel with left measurement of 28.3 cm  Ankle Measurement 10 cm from heel with left measurement of 22.1 cm  Right Extremity: Edema is present  Compression Device In Use: Yes  Device Used Correctly: Yes  Compression Device Used:  Ace Wrap  Calf Measurement 32 Leg.  Compression to Left Leg  Avoid prolonged standing in one place. Elevate leg(s)as much as possible. Off-Loading  Pressure relief shoe / inserts / foams    Additional Orders: Follow-Up Appointments  Return Appointment in 1 week.    Please schedul

## 2019-09-04 ENCOUNTER — OFFICE VISIT (OUTPATIENT)
Dept: WOUND CARE | Facility: HOSPITAL | Age: 79
End: 2019-09-04
Attending: INTERNAL MEDICINE
Payer: MEDICARE

## 2019-09-04 DIAGNOSIS — E11.621 DIABETIC FOOT ULCER (HCC): ICD-10-CM

## 2019-09-04 DIAGNOSIS — L97.509 DIABETIC FOOT ULCER (HCC): ICD-10-CM

## 2019-09-04 DIAGNOSIS — E11.8 CONTROLLED TYPE 2 DIABETES MELLITUS WITH COMPLICATION, WITHOUT LONG-TERM CURRENT USE OF INSULIN (HCC): Primary | ICD-10-CM

## 2019-09-04 LAB — GLUCOSE BLD-MCNC: 160 MG/DL (ref 70–99)

## 2019-09-04 PROCEDURE — 29581 APPL MULTLAYER CMPRN SYS LEG: CPT

## 2019-09-04 PROCEDURE — 82962 GLUCOSE BLOOD TEST: CPT

## 2019-09-05 NOTE — PROGRESS NOTES
Chief Complaint  This information was obtained from the patient  Patient is here for a wound care follow up. Patients stated no pain on the wound.      Allergies  Iodinated Contrast- Oral and IV Dye (Reaction: Tightness in throat)    HPI  This information Yes        Objective    Constitutional  Height/Length: 73 in (185.42 cm), Weight: 212 lbs (96.36 kgs), BMI: 28, Temperature: 97.5 °F (36.39 °C), Pulse: 71 bpm, Respiratory Rate: 16 breaths/min, Blood Pressure: 115/72 mmHg, Capillary Blood Glucose: 160 mg/d present  Compression Device In Use: Yes  Device Used Correctly: Yes  Compression Device Used:  Ace Wrap  Calf Measurement 32 cm from heel with left measurement of 29.2 cm  Ankle Measurement 10 cm from heel with left measurement of 21 cm  Right Extremity: Ed performed by Concha Almanzar MD. to remove devitalized tissue: slough. The following instrument(s) were used: curette. No anesthetic was required due to loss of sensation. A time out was not conducted prior to the start of the procedure.  A moderate a 3x/week    Compression Therapy:  Coflex calamine unna boot  Compression to Right Leg. Compression to Left Leg  Avoid prolonged standing in one place. Elevate leg(s)as much as possible.     Off-Loading  Pressure relief shoe / inserts / foams    Additional

## 2019-09-09 DIAGNOSIS — I50.42 CHRONIC COMBINED SYSTOLIC AND DIASTOLIC CONGESTIVE HEART FAILURE (HCC): ICD-10-CM

## 2019-09-09 DIAGNOSIS — J90 PLEURAL EFFUSION: ICD-10-CM

## 2019-09-09 RX ORDER — TORSEMIDE 20 MG/1
TABLET ORAL
Qty: 180 TABLET | Refills: 1 | Status: SHIPPED | OUTPATIENT
Start: 2019-09-09 | End: 2020-01-01

## 2019-09-11 ENCOUNTER — OFFICE VISIT (OUTPATIENT)
Dept: WOUND CARE | Facility: HOSPITAL | Age: 79
End: 2019-09-11
Attending: INTERNAL MEDICINE
Payer: MEDICARE

## 2019-09-11 DIAGNOSIS — E11.8 CONTROLLED TYPE 2 DIABETES MELLITUS WITH COMPLICATION, WITHOUT LONG-TERM CURRENT USE OF INSULIN (HCC): Primary | ICD-10-CM

## 2019-09-11 DIAGNOSIS — L97.509 DIABETIC FOOT ULCER (HCC): ICD-10-CM

## 2019-09-11 DIAGNOSIS — E11.621 DIABETIC FOOT ULCER (HCC): ICD-10-CM

## 2019-09-11 PROCEDURE — 97597 DBRDMT OPN WND 1ST 20 CM/<: CPT

## 2019-09-11 PROCEDURE — 82962 GLUCOSE BLOOD TEST: CPT

## 2019-09-11 NOTE — PROGRESS NOTES
Chief Complaint  This information was obtained from the patient  Patient is here for a wound care follow up. Patients stated no pain on the wound. No concerns or issues.     Allergies  Iodinated Contrast- Oral and IV Dye (Reaction: Tightness in throat) reconciliation completed at today's visit. : Yes        Objective    Constitutional  Height/Length: 73 in (185.42 cm), Weight: 212 lbs (96.36 kgs), BMI: 28, Temperature: 96.8 °F (36 °C), Pulse: 69 bpm, Respiratory Rate: 18 breaths/min, Blood Pressure: 125/ Assessment:  Left Extremity: Edema is present  Compression Device In Use: Yes  Device Used Correctly: Yes  Compression Device Used:  Ace Wrap  Calf Measurement 32 cm from heel with left measurement of 29.2 cm  Ankle Measurement 10 cm from heel with left gilmer area debrided of 0.81 sq cm was performed by Daryle Dakins., MD. to remove devitalized tissue: biofilm and slough. The following instrument(s) were used: curette. Pain control was achieved using 4% Lido.  A time out was not conducted prior to the star pad  Norma  Change dressing 3x/week    Compression Therapy:  Coflex calamine unna boot  Compression to Right Leg. Compression to Left Leg  Avoid prolonged standing in one place. Elevate leg(s)as much as possible.     Off-Loading  Pressure relief shoe / i

## 2019-09-12 LAB — GLUCOSE BLD-MCNC: 125 MG/DL (ref 70–99)

## 2019-09-17 ENCOUNTER — OFFICE VISIT (OUTPATIENT)
Dept: NEUROLOGY | Facility: CLINIC | Age: 79
End: 2019-09-17
Payer: MEDICARE

## 2019-09-17 VITALS — SYSTOLIC BLOOD PRESSURE: 112 MMHG | HEART RATE: 60 BPM | RESPIRATION RATE: 24 BRPM | DIASTOLIC BLOOD PRESSURE: 70 MMHG

## 2019-09-17 DIAGNOSIS — M79.2 NEUROPATHIC PAIN: Primary | ICD-10-CM

## 2019-09-17 DIAGNOSIS — R53.1 WEAKNESS: ICD-10-CM

## 2019-09-17 DIAGNOSIS — E11.00 TYPE 2 DIABETES MELLITUS WITH HYPEROSMOLARITY WITHOUT COMA, WITHOUT LONG-TERM CURRENT USE OF INSULIN (HCC): ICD-10-CM

## 2019-09-17 DIAGNOSIS — G62.9 NEUROPATHY: ICD-10-CM

## 2019-09-17 PROBLEM — R53.82 CHRONIC FATIGUE: Status: ACTIVE | Noted: 2019-09-17

## 2019-09-17 PROCEDURE — 99205 OFFICE O/P NEW HI 60 MIN: CPT | Performed by: OTHER

## 2019-09-17 RX ORDER — DULOXETIN HYDROCHLORIDE 30 MG/1
30 CAPSULE, DELAYED RELEASE ORAL DAILY
Qty: 30 CAPSULE | Refills: 11 | Status: SHIPPED | OUTPATIENT
Start: 2019-09-17 | End: 2019-01-01

## 2019-09-17 NOTE — PROGRESS NOTES
[facility]   Neurology; INITIAL CLINIC VISIT  2019, 11:27 AM     Taryn Diggs Patient Status:  No patient class for patient encounter    1940 MRN [id number]   Location [address] [facility] Robby Fish • Former smoker    • Frequent urination    • Heart murmur    • Heart palpitations    • High cholesterol    • Hyperlipidemia LDL goal <70    • Hypertension, essential, benign    • Leaking of urine    • Loss of appetite    • Microalbuminuria due to type 2 Cirrhosis in his father and mother; Heart Attack in his brother; Heart Disease in his brother and father; Other in his brother. SOCIAL HISTORY:   reports that he quit smoking about 13 years ago. His smoking use included cigarettes.  He has a 100.00 pack- Tab Take 1 tablet (25 mg total) by mouth daily. (Patient taking differently: Take 25 mg by mouth every morning.  ) Disp: 30 tablet Rfl: 3   ipratropium-albuterol 0.5-2.5 (3) MG/3ML Inhalation Solution Take 3 mL by nebulization 4 (four) times daily.  Disp: 2 Patient is a 67year old male in no acute distress. appearance: Normal developed and well nourished , in no acute stress;   HEENT:  Normal conjunctiva, no abnormal secretion, normal structure of skull, no tenderness  Neck supple,  No carotid bruit,  thyroi (CYMBALTA) 30 MG Oral Cap DR Particles    Other Relevant Orders    EMG    VITAMIN P11    FOLIC ACID SERUM(FOLATE)    VITAMIN B6    Neuropathy    Relevant Orders    NATE, DIRECT, REFLEX TO 9 ENAS    IMMUNOELECTROPHORESIS, SERUM    C-REACTIVE PROTEIN    RHEUM [address]   9/17/2019

## 2019-09-19 ENCOUNTER — OFFICE VISIT (OUTPATIENT)
Dept: WOUND CARE | Facility: HOSPITAL | Age: 79
End: 2019-09-19
Attending: INTERNAL MEDICINE
Payer: MEDICARE

## 2019-09-19 ENCOUNTER — LAB ENCOUNTER (OUTPATIENT)
Dept: LAB | Facility: HOSPITAL | Age: 79
End: 2019-09-19
Attending: Other
Payer: MEDICARE

## 2019-09-19 DIAGNOSIS — M79.2 NEUROPATHIC PAIN: ICD-10-CM

## 2019-09-19 DIAGNOSIS — L97.509 DIABETIC FOOT ULCER (HCC): ICD-10-CM

## 2019-09-19 DIAGNOSIS — E11.621 DIABETIC FOOT ULCER (HCC): ICD-10-CM

## 2019-09-19 DIAGNOSIS — E11.8 CONTROLLED TYPE 2 DIABETES MELLITUS WITH COMPLICATION, WITHOUT LONG-TERM CURRENT USE OF INSULIN (HCC): Primary | ICD-10-CM

## 2019-09-19 DIAGNOSIS — G62.9 NEUROPATHY: ICD-10-CM

## 2019-09-19 LAB
CRP SERPL-MCNC: 3.81 MG/DL (ref ?–0.3)
FOLATE SERPL-MCNC: 15.4 NG/ML (ref 8.7–?)
GLUCOSE BLD-MCNC: 170 MG/DL (ref 70–99)
RHEUMATOID FACT SERPL-ACNC: 12 IU/ML (ref ?–15)
SED RATE-ML: 42 MM/HR (ref 0–12)
VIT B12 SERPL-MCNC: 976 PG/ML (ref 193–986)

## 2019-09-19 PROCEDURE — 85652 RBC SED RATE AUTOMATED: CPT

## 2019-09-19 PROCEDURE — 86431 RHEUMATOID FACTOR QUANT: CPT

## 2019-09-19 PROCEDURE — 86235 NUCLEAR ANTIGEN ANTIBODY: CPT

## 2019-09-19 PROCEDURE — 82746 ASSAY OF FOLIC ACID SERUM: CPT

## 2019-09-19 PROCEDURE — 82607 VITAMIN B-12: CPT

## 2019-09-19 PROCEDURE — 36415 COLL VENOUS BLD VENIPUNCTURE: CPT

## 2019-09-19 PROCEDURE — 82962 GLUCOSE BLOOD TEST: CPT

## 2019-09-19 PROCEDURE — 86225 DNA ANTIBODY NATIVE: CPT

## 2019-09-19 PROCEDURE — 29581 APPL MULTLAYER CMPRN SYS LEG: CPT

## 2019-09-19 PROCEDURE — 86038 ANTINUCLEAR ANTIBODIES: CPT

## 2019-09-19 PROCEDURE — 84165 PROTEIN E-PHORESIS SERUM: CPT

## 2019-09-19 PROCEDURE — 84207 ASSAY OF VITAMIN B-6: CPT

## 2019-09-19 PROCEDURE — 86334 IMMUNOFIX E-PHORESIS SERUM: CPT

## 2019-09-19 PROCEDURE — 86140 C-REACTIVE PROTEIN: CPT

## 2019-09-19 PROCEDURE — 83883 ASSAY NEPHELOMETRY NOT SPEC: CPT

## 2019-09-23 ENCOUNTER — OFFICE VISIT (OUTPATIENT)
Dept: WOUND CARE | Facility: HOSPITAL | Age: 79
End: 2019-09-23
Attending: INTERNAL MEDICINE
Payer: MEDICARE

## 2019-09-23 DIAGNOSIS — E11.621 DIABETIC ULCER OF RIGHT FOOT ASSOCIATED WITH TYPE 2 DIABETES MELLITUS, WITH FAT LAYER EXPOSED, UNSPECIFIED PART OF FOOT (HCC): Primary | ICD-10-CM

## 2019-09-23 DIAGNOSIS — E11.621 DIABETIC FOOT ULCER (HCC): ICD-10-CM

## 2019-09-23 DIAGNOSIS — E11.8 CONTROLLED TYPE 2 DIABETES MELLITUS WITH COMPLICATION, WITHOUT LONG-TERM CURRENT USE OF INSULIN (HCC): ICD-10-CM

## 2019-09-23 DIAGNOSIS — L97.509 DIABETIC FOOT ULCER (HCC): ICD-10-CM

## 2019-09-23 DIAGNOSIS — L97.512 DIABETIC ULCER OF RIGHT FOOT ASSOCIATED WITH TYPE 2 DIABETES MELLITUS, WITH FAT LAYER EXPOSED, UNSPECIFIED PART OF FOOT (HCC): Primary | ICD-10-CM

## 2019-09-23 LAB
ANA SCREEN: POSITIVE
CENTROMERE AUTOAB: <100 AU/ML (ref ?–100)
DSDNA AUTOAB: <100 IU/ML (ref ?–100)
GLUCOSE BLD-MCNC: 127 MG/DL (ref 70–99)
HISTONE AUTOAB: <100 AU/ML (ref ?–100)
JO-1 AUTOAB: <100 AU/ML (ref ?–100)
RNP AUTOAB: <100 AU/ML (ref ?–100)
SCL-70 AUTOAB: <100 AU/ML (ref ?–100)
SM AUTOAB (SMITH): <100 AU/ML (ref ?–100)
SSA AUTOAB: 128 AU/ML (ref ?–100)
SSB AUTOAB: <100 AU/ML (ref ?–100)
VITAMIN B6: 15.1 NMOL/L

## 2019-09-23 PROCEDURE — 82962 GLUCOSE BLOOD TEST: CPT

## 2019-09-23 PROCEDURE — 15275 SKIN SUB GRAFT FACE/NK/HF/G: CPT

## 2019-09-24 LAB
ALBUMIN SERPL-MCNC: 4 G/DL (ref 3.1–4.5)
ALBUMIN/GLOB SERPL: 1.33 {RATIO}
ALPHA1 GLOB SERPL ELPH-MCNC: 0.27 G/DL (ref 0.1–0.3)
ALPHA2 GLOB SERPL ELPH-MCNC: 0.95 G/DL (ref 0.6–1)
B-GLOBULIN SERPL ELPH-MCNC: 0.83 G/DL (ref 0.7–1.2)
GAMMA GLOB SERPL ELPH-MCNC: 0.96 G/DL (ref 0.6–1.6)
KAPPA FREE LIGHT CHAIN: 2.76 MG/DL (ref 0.33–1.94)
KAPPA/LAMBDA FLC RATIO: 1.21 (ref 0.26–1.65)
LAMBDA FREE LIGHT CHAIN: 2.28 MG/DL (ref 0.57–2.63)
M PROTEIN MFR SERPL ELPH: 7 G/DL (ref 6.4–8.2)

## 2019-09-25 ENCOUNTER — TELEPHONE (OUTPATIENT)
Dept: NEUROLOGY | Facility: CLINIC | Age: 79
End: 2019-09-25

## 2019-09-25 ENCOUNTER — OFFICE VISIT (OUTPATIENT)
Dept: ELECTROPHYSIOLOGY | Facility: HOSPITAL | Age: 79
End: 2019-09-25
Attending: Other
Payer: MEDICARE

## 2019-09-25 DIAGNOSIS — M79.2 NEUROPATHIC PAIN: ICD-10-CM

## 2019-09-25 DIAGNOSIS — G62.9 NEUROPATHY: Primary | ICD-10-CM

## 2019-09-25 PROCEDURE — 95910 NRV CNDJ TEST 7-8 STUDIES: CPT | Performed by: OTHER

## 2019-09-25 NOTE — PROCEDURES
[address]      PATIENT'S NAME: Ray Blunt   REFERRING PHYSICIAN: Jenny Arredondo M.D.    PATIENT ACCOUNT #: [de-identified] LOCATION: [facility]   MEDICAL RECORD #: [id number] DATE OF BIRTH: [birth year]

## 2019-09-30 ENCOUNTER — OFFICE VISIT (OUTPATIENT)
Dept: WOUND CARE | Facility: HOSPITAL | Age: 79
End: 2019-09-30
Attending: INTERNAL MEDICINE
Payer: MEDICARE

## 2019-09-30 DIAGNOSIS — E11.8 CONTROLLED TYPE 2 DIABETES MELLITUS WITH COMPLICATION, WITHOUT LONG-TERM CURRENT USE OF INSULIN (HCC): ICD-10-CM

## 2019-09-30 DIAGNOSIS — E11.621 DIABETIC ULCER OF RIGHT FOOT ASSOCIATED WITH TYPE 2 DIABETES MELLITUS, WITH FAT LAYER EXPOSED, UNSPECIFIED PART OF FOOT (HCC): Primary | ICD-10-CM

## 2019-09-30 DIAGNOSIS — L97.512 DIABETIC ULCER OF RIGHT FOOT ASSOCIATED WITH TYPE 2 DIABETES MELLITUS, WITH FAT LAYER EXPOSED, UNSPECIFIED PART OF FOOT (HCC): Primary | ICD-10-CM

## 2019-09-30 LAB — GLUCOSE BLD-MCNC: 257 MG/DL (ref 70–99)

## 2019-09-30 PROCEDURE — 82962 GLUCOSE BLOOD TEST: CPT

## 2019-09-30 PROCEDURE — 29581 APPL MULTLAYER CMPRN SYS LEG: CPT

## 2019-09-30 NOTE — PROGRESS NOTES
Chief Complaint  This information was obtained from the patient  Patient is here for a wound care follow up. Patient denies pain on the wound.     Allergies  Iodinated Contrast- Oral and IV Dye (Reaction: Tightness in throat)    HPI  This information was (GI)  Neurological  Endocrine  Hematologic/Lymphatic    General Notes:  negative except HPI - denies fever / increased pain / periwound redness - denies chest pain    Additional Information  Medication reconciliation completed at today's visit. : Yes Edema is present  Compression Device In Use: Yes  Device Used Correctly: Yes  Compression Device Used: Multi-Layer Wrap  Calf Measurement 34 cm from heel with left measurement of 27.8 cm  Ankle Measurement 10 cm from heel with left measurement of 21 cm  Ri calamine unna boot  Compression to Right Leg. Compression to Left Leg  Avoid prolonged standing in one place. Elevate leg(s)as much as possible.     Off-Loading  Pressure relief shoe / inserts / foams    Misc/Additional Orders  Increase dietary protein  D

## 2019-10-05 DIAGNOSIS — E11.8 CONTROLLED TYPE 2 DIABETES MELLITUS WITH COMPLICATION, WITHOUT LONG-TERM CURRENT USE OF INSULIN (HCC): ICD-10-CM

## 2019-10-05 DIAGNOSIS — E11.42 DIABETIC POLYNEUROPATHY ASSOCIATED WITH TYPE 2 DIABETES MELLITUS (HCC): ICD-10-CM

## 2019-10-05 DIAGNOSIS — E03.2 HYPOTHYROIDISM DUE TO MEDICATION: ICD-10-CM

## 2019-10-07 ENCOUNTER — OFFICE VISIT (OUTPATIENT)
Dept: WOUND CARE | Facility: HOSPITAL | Age: 79
End: 2019-10-07
Attending: INTERNAL MEDICINE
Payer: MEDICARE

## 2019-10-07 ENCOUNTER — OFFICE VISIT (OUTPATIENT)
Dept: SURGERY | Facility: CLINIC | Age: 79
End: 2019-10-07
Payer: MEDICARE

## 2019-10-07 VITALS
HEIGHT: 73 IN | SYSTOLIC BLOOD PRESSURE: 128 MMHG | DIASTOLIC BLOOD PRESSURE: 78 MMHG | WEIGHT: 212 LBS | HEART RATE: 74 BPM | BODY MASS INDEX: 28.1 KG/M2

## 2019-10-07 DIAGNOSIS — E11.621 DIABETIC ULCER OF RIGHT FOOT ASSOCIATED WITH TYPE 2 DIABETES MELLITUS, WITH FAT LAYER EXPOSED, UNSPECIFIED PART OF FOOT (HCC): ICD-10-CM

## 2019-10-07 DIAGNOSIS — G62.9 NEUROPATHY: Primary | ICD-10-CM

## 2019-10-07 DIAGNOSIS — R29.898 HAND WEAKNESS: ICD-10-CM

## 2019-10-07 DIAGNOSIS — E11.8 CONTROLLED TYPE 2 DIABETES MELLITUS WITH COMPLICATION, WITHOUT LONG-TERM CURRENT USE OF INSULIN (HCC): Primary | ICD-10-CM

## 2019-10-07 DIAGNOSIS — L97.512 DIABETIC ULCER OF RIGHT FOOT ASSOCIATED WITH TYPE 2 DIABETES MELLITUS, WITH FAT LAYER EXPOSED, UNSPECIFIED PART OF FOOT (HCC): ICD-10-CM

## 2019-10-07 DIAGNOSIS — G56.03 BILATERAL CARPAL TUNNEL SYNDROME: ICD-10-CM

## 2019-10-07 PROCEDURE — 29581 APPL MULTLAYER CMPRN SYS LEG: CPT

## 2019-10-07 PROCEDURE — 99204 OFFICE O/P NEW MOD 45 MIN: CPT | Performed by: PHYSICIAN ASSISTANT

## 2019-10-07 PROCEDURE — 82962 GLUCOSE BLOOD TEST: CPT

## 2019-10-07 PROCEDURE — 15275 SKIN SUB GRAFT FACE/NK/HF/G: CPT

## 2019-10-07 RX ORDER — LEVOTHYROXINE SODIUM 0.03 MG/1
TABLET ORAL
Qty: 30 TABLET | Refills: 5 | OUTPATIENT
Start: 2019-10-07

## 2019-10-07 RX ORDER — SPIRONOLACTONE 25 MG/1
TABLET ORAL
Qty: 90 TABLET | Refills: 2 | Status: SHIPPED | OUTPATIENT
Start: 2019-10-07 | End: 2020-01-01

## 2019-10-07 RX ORDER — GLIMEPIRIDE 1 MG/1
TABLET ORAL
Qty: 90 TABLET | Refills: 1 | Status: SHIPPED | OUTPATIENT
Start: 2019-10-07 | End: 2020-01-01

## 2019-10-07 NOTE — PROGRESS NOTES
Chief Complaint  This information was obtained from the patient  Patient is here for a wound care follow up. Patient denies any pain or new wound concerns.     Allergies  Iodinated Contrast- Oral and IV Dye (Reaction: Tightness in throat)    HPI  This inf (Central/Peripheral)  Gastrointestinal (GI)  Neurological  Endocrine  Hematologic/Lymphatic    General Notes:  negative except HPI - denies fever / increased pain / periwound redness - denies chest pain / Sob    Additional Information  Medication reconcili Callus, Moist, Erythema. The temperature of the periwound skin is WNL. Periwound skin does not exhibit signs or symptoms of infection. Local Pulse is Weak.     Lower Extremity Assessment  Edema Assessment:  Left Extremity: Edema is present  Compression Barbara Ditch moderate 15-25 mmhg. The procedure was tolerated well. General Notes:  Calamine unna boot    Wound #2  Wound #2 (Diabetic Ulcer) is located on the right, lateral ankle. A Multi-Layer Compression Wrap procedure was performed.  A Multi-Layer was applied with Appointment in 1 week. Please schedule for 15 minute time slot. Care summary  Discussed the Plan of Care at bedside with patient. The patient verbally acknowledges understanding of all instructions and all questions were answered. Wound improving.  No

## 2019-10-07 NOTE — H&P
Neurosurgery Clinic Visit  10/7/2019    Shirley Alvarez PCP:  Riddhi Aldana MD    1940 MRN [id number]       CC:  Hand numbness/tingling, CTS    HPI:    Domingo Bazan is a very pleasant 67year old male with PMH of COPD on 4.5 L O2, diabetic neuropath unspecified type of vessel, native or graft    • Diabetes mellitus ([facility] [place] [address]    • Difficulty breathing    • Easy bruising    • Fatigue    • Flatulence/gas pain/belching    • Former smoker    • Frequent urination    • Heart murmur    • Heart palpitations    • H Father    • Other (Cirrhosis) Father    • Other (Cirrhosis) Mother    • Heart Disease Brother    • Heart Attack Brother         several MI   • Other (Other) Brother         HF   • Diabetes Neg      ROS:  A 10-point system was reviewed.   Pertinent positives explained in detail. The diagnosis, treatment options, and expectations were discussed. All questions were answered to satisfaction. Total visit time = 45 Minutes; More than 50% spent coordinating care and counseling.     Cathy Cruz PA-C  [address]

## 2019-10-14 NOTE — PROGRESS NOTES
Chief Complaint  This information was obtained from the patient  Patient is here for a wound care follow up.  He denies pain on the wound and no other issues    Allergies  Iodinated Contrast- Oral and IV Dye (Reaction: Tightness in throat)    HPI  This in Health)  Eyes  Ear/Nose/Mouth/Throat  Respiratory  Cardiovascular (Central/Peripheral)  Gastrointestinal (GI)  Neurological  Endocrine  Hematologic/Lymphatic    General Notes:  negative except HPI - denies fever / increased pain / periwound redness - denie Pulse is Weak. Height/Length: 73 in (185.42 cm), Weight: 212 lbs (96.36 kgs), BMI: 28, Temperature: 97.0 °F (36.11 °C), Pulse: 81 bpm, Respiratory Rate: 16 breaths/min, Blood Pressure: 115/77 mmHg, Capillary Blood Glucose: 121 mg/dl.     Lower Extremity bed contains fibrin or eschar. 4% Topical Lidocaine    Wound Cleansing & Dressings  May shower with protection.   Cleanse with saline or wound cleanser  Other skin sub: - theraskin- secure with contact layer / steristrips  Bordered foam  Change dressing ev

## 2019-10-21 NOTE — PROGRESS NOTES
Chief Complaint  This information was obtained from the patient  Patient is here for a wound care follow up. He denies any pain or new wound concerns.     Allergies  Iodinated Contrast- Oral and IV Dye (Reaction: Tightness in throat)    HPI  This informat patient    Complaints and Symptoms  Patient denies complaints or symptoms related to:  Constitutional Symptoms (General Health)  Eyes  Ear/Nose/Mouth/Throat  Respiratory  Cardiovascular (Central/Peripheral)  Gastrointestinal (GI)  Neurological  Endocrine WNL. Periwound skin does not exhibit signs or symptoms of infection. Local Pulse is Weak.     Height/Length: 73 in (185.42 cm), Weight: 212 lbs (96.36 kgs), BMI: 28, Temperature: 96.7 °F (35.94 °C), Pulse: 66 bpm, Respiratory Rate: 16 breaths/min, Blood Pre applied: adaptic, foam. A time out was not conducted prior to the start of the procedure. The procedure was tolerated well with a pain level of 0 throughout and a pain level of 0 following the procedure.     Wound #2 (Diabetic Ulcer) is located on the right Appointment in 1 week. Please schedule for 15 minute time slot. Care summary  Discussed the Plan of Care at bedside with patient. The patient verbally acknowledges understanding of all instructions and all questions were answered. Wound improving.  No

## 2019-10-28 NOTE — PROGRESS NOTES
Chief Complaint  This information was obtained from the patient  Patient is here for a wound care follow up. He denies any pain or new wound concerns.     Allergies  Iodinated Contrast- Oral and IV Dye (Reaction: Tightness in throat)    HPI  This informat information was obtained from the patient    Complaints and Symptoms  Patient denies complaints or symptoms related to:  Constitutional Symptoms (General Health)  Eyes  Ear/Nose/Mouth/Throat  Respiratory  Cardiovascular (Central/Peripheral)  Gastrointestin place.    Vitals  Height/Length: 73 in (185.42 cm), Weight: 212 lbs (96.36 kgs), BMI: 28, Temperature: 96.3 °F (35.72 °C), Pulse: 81 bpm, Respiratory Rate: 16 breaths/min, Blood Pressure: 110/74 mmHg, Capillary Blood Glucose: 515 mg/dl.     Lower Extremity wound bed contains fibrin or eschar. 4% Topical Lidocaine    Wound Cleansing & Dressings  May shower with protection. Bordered foam  Change dressing every: - week    Compression Therapy:  Coflex calamine unna boot  Compression to Right Leg.   Compression

## 2019-11-04 NOTE — PROGRESS NOTES
Chief Complaint  This information was obtained from the patient  Patient is here for a wound care follow up.  Patients denies any issues or pain    Allergies  Iodinated Contrast- Oral and IV Dye (Reaction: Tightness in throat)    HPI  This information was repair, CAD c/p CABG, AAA erpair. SH - quit smoking 10 years ago, but has 100 pack year smoking history.     Review of Systems (ROS)  This information was obtained from the patient    Complaints and Symptoms  Patient denies complaints or symptoms related defined. Wound bed has % pink, spongy granulation. The periwound skin did not exhibit: Edema, Callus, Moist, Erythema. The temperature of the periwound skin is WNL. Periwound skin does not exhibit signs or symptoms of infection. Local Pulse is Weak. well.  General Notes:  Lina tapiaa boot        Plan    Wound Orders:  Wound #1 Left, Lateral Ankle    Topicals:  Initial Anesthetic Order: Apply lidocaine to wound bed on all future wound center visits during preparation for physician exam if wound bed

## 2019-11-05 NOTE — ANESTHESIA PREPROCEDURE EVALUATION
PRE-OP EVALUATION    Patient Name: Lorne Kulkarni    Pre-op Diagnosis: Personal history of colonic polyps [Z86.010]    Procedure(s):  Colonoscopy    Surgeon(s) and Role:     Magalys Beckett MD - Primary    Pre-op vitals reviewed. Temp: 98.1 °F (36. MG Oral Tab EC, Take 1 tablet by mouth daily. , Disp: 30 tablet, Rfl: 11        Allergies: Radiology Contrast Iodinated Dyes; Iodine I 131 Tositumomab      Anesthesia Evaluation    Patient summary reviewed.     Anesthetic Complications           GI/Hepatic/R frequency: Never      Drug use: No     Available pre-op labs reviewed.   Lab Results   Component Value Date    WBC 4.4 08/23/2019    WBC 4.35 08/16/2019    RBC 4.77 08/23/2019    RBC 4.88 08/16/2019    HGB 14.1 08/23/2019    HGB 14.4 08/16/2019    HCT 45.7

## 2019-11-05 NOTE — OPERATIVE REPORT
Cathie Ramos Patient Status:  Hospital Outpatient Surgery    1940 MRN [id number]   [facility] ENDOSCOPY Attending Katelyn Quintana MD   Date 2019 PCP Rubens Dooley MD     PREOPERATIVE DIAGNOSIS/INDICATION: H/o polyps biopsies.  - Repeat colonoscopy in 5 years if clinically indicated at that time.     Wing Cipro  11/5/2019  10:21 AM

## 2019-11-05 NOTE — ANESTHESIA POSTPROCEDURE EVALUATION
[facility] Patient Status:  Hospital Outpatient Surgery   Age/Gender 67year old male MRN [id number]   Location [facility]. Attending Iraj James MD   Hosp Day # 0 PCP Joann Valdovinos MD       Anesthesia Post

## 2019-11-05 NOTE — H&P
[facility] Patient Status:  Hospital Outpatient Surgery    1940 MRN [id number]   [facility] ENDOSCOPY Attending Trey Manzanares MD   Date 2019 PCP Walker Luu MD     CC: H/o polyp obstruction    • Visual impairment     glasses   • Wears glasses      Past Surgical History:   Procedure Laterality Date   • ANGIOGRAM  06/16/2017       • APPENDECTOMY     • ARTHROSCOPY OF JOINT UNLISTED Left 1994    knee   • CARPAL TUNNEL RELEASE Left 4 Oral, Q15 Min PRN **OR** dextrose 50 % injection 50 mL, 50 mL, Intravenous, Q15 Min PRN **OR** glucose (DEX4) oral liquid 30 g, 30 g, Oral, Q15 Min PRN **OR** Glucose-Vitamin C (DEX-4) chewable tab 8 tablet, 8 tablet, Oral, Q15 Min PRN    Facility-Administ Hypothyroidism due to medication     Aneurysm, abdominal aortic (HCC)     Atherosclerosis of coronary artery     Chronic renal insufficiency     Neuropathic pain     Neuropathy     Weakness     Chronic fatigue      H/o polyps  Plan:  Colonoscopy    Katherin Judd

## 2019-11-11 NOTE — PROGRESS NOTES
Chief Complaint  This information was obtained from the patient  Patient is here for a wound care follow up. He denies any pain.     Allergies  Iodinated Contrast- Oral and IV Dye (Reaction: Tightness in throat)    HPI  This information was obtained from replacement with bovine valve, kidney stone removal, left knee repair, CAD c/p CABG, AAA erpair. SH - quit smoking 10 years ago, but has 100 pack year smoking history. General Notes:  4th application of Becki this visit.  Mario    Review of S been noted. There is a scant amount of serous drainage noted which has no odor. The patient reports no wound pain due to the wound being insensate. The wound margin is well defined. Wound bed has % pink, spongy granulation.   The periwound skin did no to large for wounds. 6 sq cm of product was utilized and was not secured. Post application, a dressing was applied: versatel contact layer, gauze, border foam. A time out was not conducted prior to the start of the procedure.  The procedure was tolerated we sub: - theraskin / contact layer / gauze bolster / thick foam.  Kerlix  Change dressing every: - week    Compression Therapy:  Coflex calamine unna boot  Compression to Right Leg. Compression to Left Leg  Avoid prolonged standing in one place.   Elevate le

## 2019-11-13 NOTE — PROGRESS NOTES
Date: 2019    To: Kenia Ludwig  : 1940    I hope this letter finds you doing well. I am writing to inform you of the following:         The biopsies obtained from your recent colonoscopy indicate that the polyps were adenomas which, altho

## 2019-11-18 NOTE — PROGRESS NOTES
Chief Complaint  This information was obtained from the patient  Patient is here for a wound care follow up. He denies any pain.     Allergies  Iodinated Contrast- Oral and IV Dye (Reaction: Tightness in throat)    HPI  This information was obtained from appendectomy, cholecystectomy, heart valve replacement with bovine valve, kidney stone removal, left knee repair, CAD c/p CABG, AAA erpair. SH - quit smoking 10 years ago, but has 100 pack year smoking history.     Review of Systems (ROS)  This informati the periwound skin is WNL. Periwound skin does not exhibit signs or symptoms of infection. Local Pulse is Weak. General Notes:  Theraskin left in place.     Vitals  Height/Length: 73 in (185.42 cm), Weight: 212 lbs (96.36 kgs), BMI: 28, Temperature: 96.5 ° Ankle    Topicals:  Initial Anesthetic Order: Apply lidocaine to wound bed on all future wound center visits during preparation for physician exam if wound bed contains fibrin or eschar.   4% Topical Lidocaine    Wound Cleansing & Dressings  May shower with assessed by doppler. - triphasic Rt DP ; left DP not heard; left PT good waveform

## 2019-12-02 NOTE — PROGRESS NOTES
Chief Complaint  This information was obtained from the patient  Patient is here for a wound care follow up. He denies any new concerns or pain.     Allergies  Iodinated Contrast- Oral and IV Dye (Reaction: Tightness in throat)    HPI  This information wa Valvular heart disease, Hyperlipidemia  PSH - appendectomy, cholecystectomy, heart valve replacement with bovine valve, kidney stone removal, left knee repair, CAD c/p CABG, AAA erpair.     SH - quit smoking 10 years ago, but has 100 pack year smoking histo wound pain due to the wound being insensate. The wound margin is well defined. Wound bed has % epithelialization. The periwound skin exhibited: Edema, Dry/Scaly. The periwound skin did not exhibit: Callus, Moist, Erythema.  The temperature of the per

## 2019-12-17 PROBLEM — G56.21 ULNAR NEUROPATHY AT ELBOW OF RIGHT UPPER EXTREMITY: Status: ACTIVE | Noted: 2019-01-01

## 2019-12-17 PROBLEM — M47.22 OSTEOARTHRITIS OF SPINE WITH RADICULOPATHY, CERVICAL REGION: Status: ACTIVE | Noted: 2019-01-01

## 2019-12-17 PROBLEM — G56.03 BILATERAL CARPAL TUNNEL SYNDROME: Status: ACTIVE | Noted: 2019-01-01

## 2019-12-17 NOTE — PROGRESS NOTES
[facility] [address]   Neurology; follow up CLINIC VISIT  2019    Paddy Louis Patient Status:  No patient class for patient encounter    1940 MRN [id number]   Location ED [facility], [address], [address] [place] [address] PCP breathing    • Disorder of thyroid    • Easy bruising    • Fatigue    • Flatulence/gas pain/belching    • Former smoker    • Frequent urination    • Heart murmur    • Heart palpitations    • High blood pressure    • High cholesterol    • Hyperlipidemia LDL OTHER SURGICAL HISTORY      On 2/13/2018: re-do sternotomy and pericardectomy   • VALVE REPAIR  07/21/2017    [facility] in [place], [place]   • VALVE REPLACEMENT  01/30/2015    [facility] at [facility]       FAMILY HISTORY:  family history includes Cirrhosis in his father and mo • Azelastine HCl 0.1 % Nasal Solution by Nasal route 2 (two) times daily as needed. • INCRUSE ELLIPTA 62.5 MCG/INH Inhalation Aerosol Powder, Breath Activated 1 puff daily.     5   • spironolactone 25 MG Oral Tab Take 1 tablet (25 mg total) by mouth 67year old male in no acute distress. appearance: Normal developed and well nourished , in no acute stress;   HEENT:  Normal conjunctiva, no abnormal secretion, normal structure of skull, no tenderness  Neck supple,  No carotid bruit,  thyroid normal  Mady consistent with bilateral carpal tunnel syndrome,  to a mild degree on the right and to a moderate to severe degree on the left. There is also finding of right ulnar neuropathy across the elbow of moderate to severe degree.   Left ulnar neuropathy below th cervical region  Plan: PHYSICAL THERAPY EXTERNAL    (G56.21) Ulnar neuropathy at elbow of right upper extremity        [place]:  Clinically, This  patient presented with constant neuropathic pain in both arms, likely part of severe neuropathy,   But can no

## 2019-12-17 NOTE — PROGRESS NOTES
Patient states his left hand is more worse than the right. Patient states it is starting to close more often.

## 2020-01-01 ENCOUNTER — ORDER TRANSCRIPTION (OUTPATIENT)
Dept: ADMINISTRATIVE | Facility: HOSPITAL | Age: 80
End: 2020-01-01

## 2020-01-01 ENCOUNTER — MED REC SCAN ONLY (OUTPATIENT)
Dept: FAMILY MEDICINE CLINIC | Facility: CLINIC | Age: 80
End: 2020-01-01

## 2020-01-01 ENCOUNTER — ANESTHESIA EVENT (OUTPATIENT)
Dept: MEDSURG UNIT | Facility: HOSPITAL | Age: 80
DRG: 208 | End: 2020-01-01
Payer: MEDICARE

## 2020-01-01 ENCOUNTER — OFFICE VISIT (OUTPATIENT)
Dept: OCCUPATIONAL MEDICINE | Facility: HOSPITAL | Age: 80
End: 2020-01-01
Attending: Other
Payer: MEDICARE

## 2020-01-01 ENCOUNTER — TELEPHONE (OUTPATIENT)
Dept: PHYSICAL THERAPY | Facility: HOSPITAL | Age: 80
End: 2020-01-01

## 2020-01-01 ENCOUNTER — TELEPHONE (OUTPATIENT)
Dept: OCCUPATIONAL MEDICINE | Facility: HOSPITAL | Age: 80
End: 2020-01-01

## 2020-01-01 ENCOUNTER — EXTERNAL RECORD (OUTPATIENT)
Dept: HEALTH INFORMATION MANAGEMENT | Facility: OTHER | Age: 80
End: 2020-01-01

## 2020-01-01 ENCOUNTER — TELEMEDICINE (OUTPATIENT)
Dept: FAMILY MEDICINE CLINIC | Facility: CLINIC | Age: 80
End: 2020-01-01

## 2020-01-01 ENCOUNTER — HOSPITAL ENCOUNTER (OUTPATIENT)
Dept: GENERAL RADIOLOGY | Facility: HOSPITAL | Age: 80
Discharge: HOME | End: 2020-01-01
Attending: INTERNAL MEDICINE
Payer: MEDICARE

## 2020-01-01 ENCOUNTER — APPOINTMENT (OUTPATIENT)
Dept: GENERAL RADIOLOGY | Facility: HOSPITAL | Age: 80
DRG: 208 | End: 2020-01-01
Attending: INTERNAL MEDICINE
Payer: MEDICARE

## 2020-01-01 ENCOUNTER — APPOINTMENT (OUTPATIENT)
Dept: CV DIAGNOSTICS | Facility: HOSPITAL | Age: 80
DRG: 208 | End: 2020-01-01
Attending: INTERNAL MEDICINE
Payer: MEDICARE

## 2020-01-01 ENCOUNTER — OFFICE VISIT (OUTPATIENT)
Dept: FAMILY MEDICINE CLINIC | Facility: CLINIC | Age: 80
End: 2020-01-01
Payer: MEDICARE

## 2020-01-01 ENCOUNTER — HOSPITAL ENCOUNTER (INPATIENT)
Facility: HOSPITAL | Age: 80
LOS: 10 days | DRG: 208 | End: 2020-01-01
Attending: EMERGENCY MEDICINE | Admitting: HOSPITALIST
Payer: MEDICARE

## 2020-01-01 ENCOUNTER — NURSE ONLY (OUTPATIENT)
Dept: ELECTROPHYSIOLOGY | Facility: HOSPITAL | Age: 80
DRG: 208 | End: 2020-01-01
Attending: INTERNAL MEDICINE
Payer: MEDICARE

## 2020-01-01 ENCOUNTER — APPOINTMENT (OUTPATIENT)
Dept: LAB | Age: 80
End: 2020-01-01
Attending: FAMILY MEDICINE
Payer: MEDICARE

## 2020-01-01 ENCOUNTER — OFFICE VISIT (OUTPATIENT)
Dept: WOUND CARE | Facility: HOSPITAL | Age: 80
End: 2020-01-01
Attending: INTERNAL MEDICINE
Payer: MEDICARE

## 2020-01-01 ENCOUNTER — LAB ENCOUNTER (OUTPATIENT)
Dept: LAB | Facility: HOSPITAL | Age: 80
End: 2020-01-01
Attending: FAMILY MEDICINE
Payer: MEDICARE

## 2020-01-01 ENCOUNTER — APPOINTMENT (OUTPATIENT)
Dept: CT IMAGING | Facility: HOSPITAL | Age: 80
DRG: 208 | End: 2020-01-01
Attending: EMERGENCY MEDICINE
Payer: MEDICARE

## 2020-01-01 ENCOUNTER — APPOINTMENT (OUTPATIENT)
Dept: LAB | Facility: HOSPITAL | Age: 80
End: 2020-01-01
Attending: INTERNAL MEDICINE
Payer: MEDICARE

## 2020-01-01 ENCOUNTER — HOSPITAL ENCOUNTER (EMERGENCY)
Facility: HOSPITAL | Age: 80
Discharge: HOME | End: 2020-01-01
Attending: EMERGENCY MEDICINE
Payer: MEDICARE

## 2020-01-01 ENCOUNTER — TELEPHONE (OUTPATIENT)
Dept: NEUROLOGY | Facility: CLINIC | Age: 80
End: 2020-01-01

## 2020-01-01 ENCOUNTER — ANESTHESIA (OUTPATIENT)
Dept: MEDSURG UNIT | Facility: HOSPITAL | Age: 80
DRG: 208 | End: 2020-01-01
Payer: MEDICARE

## 2020-01-01 ENCOUNTER — APPOINTMENT (OUTPATIENT)
Dept: ULTRASOUND IMAGING | Facility: HOSPITAL | Age: 80
DRG: 208 | End: 2020-01-01
Attending: INTERNAL MEDICINE
Payer: MEDICARE

## 2020-01-01 ENCOUNTER — TELEPHONE (OUTPATIENT)
Dept: CARDIOLOGY | Age: 80
End: 2020-01-01

## 2020-01-01 ENCOUNTER — LAB ENCOUNTER (OUTPATIENT)
Dept: LAB | Age: 80
End: 2020-01-01
Attending: FAMILY MEDICINE
Payer: MEDICARE

## 2020-01-01 ENCOUNTER — APPOINTMENT (OUTPATIENT)
Dept: GENERAL RADIOLOGY | Facility: HOSPITAL | Age: 80
DRG: 208 | End: 2020-01-01
Attending: EMERGENCY MEDICINE
Payer: MEDICARE

## 2020-01-01 VITALS
HEART RATE: 84 BPM | RESPIRATION RATE: 20 BRPM | BODY MASS INDEX: 23.33 KG/M2 | HEIGHT: 73 IN | DIASTOLIC BLOOD PRESSURE: 70 MMHG | TEMPERATURE: 97 F | SYSTOLIC BLOOD PRESSURE: 124 MMHG | WEIGHT: 176 LBS

## 2020-01-01 VITALS
OXYGEN SATURATION: 61 % | WEIGHT: 143.94 LBS | BODY MASS INDEX: 19.08 KG/M2 | HEIGHT: 73 IN | TEMPERATURE: 99 F | SYSTOLIC BLOOD PRESSURE: 84 MMHG | RESPIRATION RATE: 16 BRPM | DIASTOLIC BLOOD PRESSURE: 69 MMHG

## 2020-01-01 VITALS
HEIGHT: 73 IN | HEART RATE: 87 BPM | SYSTOLIC BLOOD PRESSURE: 114 MMHG | DIASTOLIC BLOOD PRESSURE: 70 MMHG | TEMPERATURE: 98 F | WEIGHT: 176 LBS | OXYGEN SATURATION: 99 % | RESPIRATION RATE: 18 BRPM | BODY MASS INDEX: 23.33 KG/M2

## 2020-01-01 DIAGNOSIS — J84.9 ILD (INTERSTITIAL LUNG DISEASE) (HCC): ICD-10-CM

## 2020-01-01 DIAGNOSIS — S71.001A BLEEDING FROM RIGHT HIP WOUND, INITIAL ENCOUNTER: ICD-10-CM

## 2020-01-01 DIAGNOSIS — R63.0 DECREASED APPETITE: ICD-10-CM

## 2020-01-01 DIAGNOSIS — Z00.00 ENCOUNTER FOR ANNUAL HEALTH EXAMINATION: Primary | ICD-10-CM

## 2020-01-01 DIAGNOSIS — E78.00 PURE HYPERCHOLESTEROLEMIA: ICD-10-CM

## 2020-01-01 DIAGNOSIS — L89.311 PRESSURE INJURY OF RIGHT BUTTOCK, STAGE 1: ICD-10-CM

## 2020-01-01 DIAGNOSIS — E11.42 DIABETIC POLYNEUROPATHY ASSOCIATED WITH TYPE 2 DIABETES MELLITUS (HCC): ICD-10-CM

## 2020-01-01 DIAGNOSIS — I48.20 CHRONIC ATRIAL FIBRILLATION (HCC): ICD-10-CM

## 2020-01-01 DIAGNOSIS — E11.8 CONTROLLED TYPE 2 DIABETES MELLITUS WITH COMPLICATION, WITHOUT LONG-TERM CURRENT USE OF INSULIN (HCC): ICD-10-CM

## 2020-01-01 DIAGNOSIS — E11.8 CONTROLLED TYPE 2 DIABETES MELLITUS WITH COMPLICATION, WITHOUT LONG-TERM CURRENT USE OF INSULIN (HCC): Primary | ICD-10-CM

## 2020-01-01 DIAGNOSIS — R26.9 UNSPECIFIED ABNORMALITIES OF GAIT AND MOBILITY: ICD-10-CM

## 2020-01-01 DIAGNOSIS — G56.03 BILATERAL CARPAL TUNNEL SYNDROME: ICD-10-CM

## 2020-01-01 DIAGNOSIS — M79.2 NEUROPATHIC PAIN: ICD-10-CM

## 2020-01-01 DIAGNOSIS — Z99.89 OSA ON CPAP: ICD-10-CM

## 2020-01-01 DIAGNOSIS — Z95.2 S/P AVR (AORTIC VALVE REPLACEMENT): ICD-10-CM

## 2020-01-01 DIAGNOSIS — G47.33 OSA ON CPAP: ICD-10-CM

## 2020-01-01 DIAGNOSIS — R62.7 FAILURE TO THRIVE IN ADULT: ICD-10-CM

## 2020-01-01 DIAGNOSIS — J96.11 CHRONIC RESPIRATORY FAILURE WITH HYPOXIA (HCC): ICD-10-CM

## 2020-01-01 DIAGNOSIS — J90 PLEURAL EFFUSION: ICD-10-CM

## 2020-01-01 DIAGNOSIS — I50.42 CHRONIC COMBINED SYSTOLIC AND DIASTOLIC CONGESTIVE HEART FAILURE (HCC): ICD-10-CM

## 2020-01-01 DIAGNOSIS — G56.21 ULNAR NEUROPATHY AT ELBOW OF RIGHT UPPER EXTREMITY: ICD-10-CM

## 2020-01-01 DIAGNOSIS — M79.2 NEUROPATHIC PAIN: Primary | ICD-10-CM

## 2020-01-01 DIAGNOSIS — E03.2 HYPOTHYROIDISM DUE TO MEDICATION: ICD-10-CM

## 2020-01-01 DIAGNOSIS — R63.4 WEIGHT LOSS: ICD-10-CM

## 2020-01-01 DIAGNOSIS — I10 ESSENTIAL (PRIMARY) HYPERTENSION: ICD-10-CM

## 2020-01-01 DIAGNOSIS — E86.0 DEHYDRATION: ICD-10-CM

## 2020-01-01 DIAGNOSIS — L89.153 PRESSURE ULCER OF SACRAL REGION, STAGE 3 (HCC): Primary | ICD-10-CM

## 2020-01-01 DIAGNOSIS — J43.8 OTHER EMPHYSEMA (HCC): ICD-10-CM

## 2020-01-01 DIAGNOSIS — I10 ESSENTIAL HYPERTENSION: ICD-10-CM

## 2020-01-01 DIAGNOSIS — N40.0 BPH WITHOUT OBSTRUCTION/LOWER URINARY TRACT SYMPTOMS: ICD-10-CM

## 2020-01-01 DIAGNOSIS — Z12.5 SCREENING FOR PROSTATE CANCER: ICD-10-CM

## 2020-01-01 DIAGNOSIS — J98.4 RESTRICTIVE LUNG DISEASE: ICD-10-CM

## 2020-01-01 DIAGNOSIS — D89.2 HYPERGAMMAGLOBULINEMIA: ICD-10-CM

## 2020-01-01 DIAGNOSIS — E03.2 HYPOTHYROIDISM DUE TO MEDICATION: Primary | ICD-10-CM

## 2020-01-01 DIAGNOSIS — Z79.899 LONG-TERM USE OF HIGH-RISK MEDICATION: ICD-10-CM

## 2020-01-01 DIAGNOSIS — I27.20 MILD PULMONARY HYPERTENSION (HCC): ICD-10-CM

## 2020-01-01 DIAGNOSIS — D89.2 HYPERGAMMAGLOBULINEMIA: Primary | ICD-10-CM

## 2020-01-01 DIAGNOSIS — L89.159 PRESSURE INJURY OF SKIN OF SACRAL REGION, UNSPECIFIED INJURY STAGE: Primary | ICD-10-CM

## 2020-01-01 DIAGNOSIS — H91.93 CHANGE IN HEARING, BILATERAL: ICD-10-CM

## 2020-01-01 DIAGNOSIS — J98.4 RESTRICTIVE LUNG DISEASE: Primary | ICD-10-CM

## 2020-01-01 DIAGNOSIS — R09.02 HYPOXEMIA: Primary | ICD-10-CM

## 2020-01-01 LAB
ALBUMIN SERPL ELPH-MCNC: 3.72 G/DL (ref 3.75–5.21)
ALBUMIN SERPL-MCNC: 3 G/DL (ref 3.4–5)
ALBUMIN/GLOB SERPL: 0.8 {RATIO} (ref 1–2)
ALBUMIN/GLOB SERPL: 1.14 {RATIO} (ref 1–2)
ALLENS TEST: POSITIVE
ALP LIVER SERPL-CCNC: 93 U/L (ref 45–117)
ALPHA1 GLOB SERPL ELPH-MCNC: 0.45 G/DL (ref 0.19–0.46)
ALPHA2 GLOB SERPL ELPH-MCNC: 1.04 G/DL (ref 0.48–1.05)
ALT SERPL-CCNC: 13 U/L (ref 16–61)
ANION GAP SERPL CALC-SCNC: 1 MMOL/L (ref 0–18)
APTT PPP: 35 SECONDS (ref 25.4–36.1)
ARTERIAL BLD GAS O2 SATURATION: 89 % (ref 92–100)
ARTERIAL BLOOD GAS BASE EXCESS: 7.7 MMOL/L (ref ?–2)
ARTERIAL BLOOD GAS HCO3: 33.2 MEQ/L (ref 22–26)
ARTERIAL BLOOD GAS PCO2: 51 MM HG (ref 35–45)
ARTERIAL BLOOD GAS PH: 7.43 (ref 7.35–7.45)
ARTERIAL BLOOD GAS PO2: 56 MM HG (ref 80–105)
AST SERPL-CCNC: 8 U/L (ref 15–37)
B-GLOBULIN SERPL ELPH-MCNC: 0.81 G/DL (ref 0.68–1.23)
BASOPHILS # BLD AUTO: 0.04 X10(3) UL (ref 0–0.2)
BASOPHILS NFR BLD AUTO: 0.7 %
BILIRUB SERPL-MCNC: 0.3 MG/DL (ref 0.1–2)
BUN BLD-MCNC: 25 MG/DL (ref 7–18)
BUN/CREAT SERPL: 32.9 (ref 10–20)
CALCIUM BLD-MCNC: 9.5 MG/DL (ref 8.5–10.1)
CALCULATED O2 SATURATION: 91 % (ref 92–100)
CARBOXYHEMOGLOBIN: 1.3 % SAT (ref 0–3)
CHLORIDE SERPL-SCNC: 96 MMOL/L (ref 98–112)
CO2 SERPL-SCNC: 38 MMOL/L (ref 21–32)
CREAT BLD-MCNC: 0.76 MG/DL (ref 0.7–1.3)
DEPRECATED RDW RBC AUTO: 44 FL (ref 35.1–46.3)
EOSINOPHIL # BLD AUTO: 0.18 X10(3) UL (ref 0–0.7)
EOSINOPHIL NFR BLD AUTO: 3.1 %
ERYTHROCYTE [DISTWIDTH] IN BLOOD BY AUTOMATED COUNT: 13.3 % (ref 11–15)
FIO2: 21 %
GAMMA GLOB SERPL ELPH-MCNC: 0.98 G/DL (ref 0.62–1.7)
GLOBULIN PLAS-MCNC: 3.7 G/DL (ref 2.8–4.4)
GLUCOSE BLD-MCNC: 104 MG/DL (ref 70–99)
HCT VFR BLD AUTO: 35.6 % (ref 39–53)
HGB BLD-MCNC: 10.5 G/DL (ref 13–17.5)
IMM GRANULOCYTES # BLD AUTO: 0.02 X10(3) UL (ref 0–1)
IMM GRANULOCYTES NFR BLD: 0.3 %
INR BLD: 1.09 (ref 0.89–1.11)
KAPPA FREE LIGHT CHAIN: 2.18 MG/DL (ref 0.33–1.94)
KAPPA/LAMBDA FLC RATIO: 1.11 (ref 0.26–1.65)
LAMBDA FREE LIGHT CHAIN: 1.97 MG/DL (ref 0.57–2.63)
LYMPHOCYTES # BLD AUTO: 0.31 X10(3) UL (ref 1–4)
LYMPHOCYTES NFR BLD AUTO: 5.4 %
M PROTEIN MFR SERPL ELPH: 6.7 G/DL (ref 6.4–8.2)
M PROTEIN MFR SERPL ELPH: 7 G/DL (ref 6.4–8.2)
MCH RBC QN AUTO: 26.6 PG (ref 26–34)
MCHC RBC AUTO-ENTMCNC: 29.5 G/DL (ref 31–37)
MCV RBC AUTO: 90.4 FL (ref 80–100)
METHEMOGLOBIN: 0.6 % SAT (ref 0.4–1.5)
MONOCYTES # BLD AUTO: 0.43 X10(3) UL (ref 0.1–1)
MONOCYTES NFR BLD AUTO: 7.5 %
NEUTROPHILS # BLD AUTO: 4.76 X10 (3) UL (ref 1.5–7.7)
NEUTROPHILS # BLD AUTO: 4.76 X10(3) UL (ref 1.5–7.7)
NEUTROPHILS NFR BLD AUTO: 83 %
OSMOLALITY SERPL CALC.SUM OF ELEC: 285 MOSM/KG (ref 275–295)
P/F RATIO: 267.5 MMHG
PATIENT TEMPERATURE: 98.6 F
PLATELET # BLD AUTO: 216 10(3)UL (ref 150–450)
POTASSIUM SERPL-SCNC: 4.6 MMOL/L (ref 3.5–5.1)
PSA SERPL DL<=0.01 NG/ML-MCNC: 14.4 SECONDS (ref 12.4–14.6)
RBC # BLD AUTO: 3.94 X10(6)UL (ref 3.8–5.8)
SODIUM SERPL-SCNC: 135 MMOL/L (ref 136–145)
TOTAL HEMOGLOBIN: 12.5 G/DL (ref 12.6–17.4)
WBC # BLD AUTO: 5.7 X10(3) UL (ref 4–11)

## 2020-01-01 PROCEDURE — 83883 ASSAY NEPHELOMETRY NOT SPEC: CPT

## 2020-01-01 PROCEDURE — 83036 HEMOGLOBIN GLYCOSYLATED A1C: CPT

## 2020-01-01 PROCEDURE — 99291 CRITICAL CARE FIRST HOUR: CPT | Performed by: INTERNAL MEDICINE

## 2020-01-01 PROCEDURE — 99283 EMERGENCY DEPT VISIT LOW MDM: CPT

## 2020-01-01 PROCEDURE — 99232 SBSQ HOSP IP/OBS MODERATE 35: CPT | Performed by: INTERNAL MEDICINE

## 2020-01-01 PROCEDURE — 74230 X-RAY XM SWLNG FUNCJ C+: CPT | Performed by: INTERNAL MEDICINE

## 2020-01-01 PROCEDURE — 12001 RPR S/N/AX/GEN/TRNK 2.5CM/<: CPT

## 2020-01-01 PROCEDURE — 71045 X-RAY EXAM CHEST 1 VIEW: CPT | Performed by: INTERNAL MEDICINE

## 2020-01-01 PROCEDURE — 84165 PROTEIN E-PHORESIS SERUM: CPT

## 2020-01-01 PROCEDURE — 80061 LIPID PANEL: CPT

## 2020-01-01 PROCEDURE — 36415 COLL VENOUS BLD VENIPUNCTURE: CPT

## 2020-01-01 PROCEDURE — 97597 DBRDMT OPN WND 1ST 20 CM/<: CPT

## 2020-01-01 PROCEDURE — 86140 C-REACTIVE PROTEIN: CPT

## 2020-01-01 PROCEDURE — 97140 MANUAL THERAPY 1/> REGIONS: CPT

## 2020-01-01 PROCEDURE — 86334 IMMUNOFIX E-PHORESIS SERUM: CPT

## 2020-01-01 PROCEDURE — 99233 SBSQ HOSP IP/OBS HIGH 50: CPT | Performed by: INTERNAL MEDICINE

## 2020-01-01 PROCEDURE — G0439 PPPS, SUBSEQ VISIT: HCPCS | Performed by: NURSE PRACTITIONER

## 2020-01-01 PROCEDURE — 5A1935Z RESPIRATORY VENTILATION, LESS THAN 24 CONSECUTIVE HOURS: ICD-10-PCS | Performed by: INTERNAL MEDICINE

## 2020-01-01 PROCEDURE — 80053 COMPREHEN METABOLIC PANEL: CPT

## 2020-01-01 PROCEDURE — 99232 SBSQ HOSP IP/OBS MODERATE 35: CPT | Performed by: HOSPITALIST

## 2020-01-01 PROCEDURE — 93306 TTE W/DOPPLER COMPLETE: CPT | Performed by: INTERNAL MEDICINE

## 2020-01-01 PROCEDURE — 0BH17EZ INSERTION OF ENDOTRACHEAL AIRWAY INTO TRACHEA, VIA NATURAL OR ARTIFICIAL OPENING: ICD-10-PCS | Performed by: ANESTHESIOLOGY

## 2020-01-01 PROCEDURE — 85025 COMPLETE CBC W/AUTO DIFF WBC: CPT | Performed by: EMERGENCY MEDICINE

## 2020-01-01 PROCEDURE — 71046 X-RAY EXAM CHEST 2 VIEWS: CPT | Performed by: INTERNAL MEDICINE

## 2020-01-01 PROCEDURE — 97110 THERAPEUTIC EXERCISES: CPT

## 2020-01-01 PROCEDURE — 99223 1ST HOSP IP/OBS HIGH 75: CPT | Performed by: HOSPITALIST

## 2020-01-01 PROCEDURE — 82570 ASSAY OF URINE CREATININE: CPT

## 2020-01-01 PROCEDURE — 76770 US EXAM ABDO BACK WALL COMP: CPT | Performed by: INTERNAL MEDICINE

## 2020-01-01 PROCEDURE — 02HV33Z INSERTION OF INFUSION DEVICE INTO SUPERIOR VENA CAVA, PERCUTANEOUS APPROACH: ICD-10-PCS | Performed by: INTERNAL MEDICINE

## 2020-01-01 PROCEDURE — 71045 X-RAY EXAM CHEST 1 VIEW: CPT | Performed by: EMERGENCY MEDICINE

## 2020-01-01 PROCEDURE — 99214 OFFICE O/P EST MOD 30 MIN: CPT

## 2020-01-01 PROCEDURE — 71250 CT THORAX DX C-: CPT | Performed by: EMERGENCY MEDICINE

## 2020-01-01 PROCEDURE — 83050 HGB METHEMOGLOBIN QUAN: CPT

## 2020-01-01 PROCEDURE — 82248 BILIRUBIN DIRECT: CPT

## 2020-01-01 PROCEDURE — 85610 PROTHROMBIN TIME: CPT | Performed by: EMERGENCY MEDICINE

## 2020-01-01 PROCEDURE — 86160 COMPLEMENT ANTIGEN: CPT

## 2020-01-01 PROCEDURE — 36600 WITHDRAWAL OF ARTERIAL BLOOD: CPT

## 2020-01-01 PROCEDURE — 99221 1ST HOSP IP/OBS SF/LOW 40: CPT | Performed by: INTERNAL MEDICINE

## 2020-01-01 PROCEDURE — 82043 UR ALBUMIN QUANTITATIVE: CPT

## 2020-01-01 PROCEDURE — 85730 THROMBOPLASTIN TIME PARTIAL: CPT | Performed by: EMERGENCY MEDICINE

## 2020-01-01 PROCEDURE — 85025 COMPLETE CBC W/AUTO DIFF WBC: CPT

## 2020-01-01 PROCEDURE — 80053 COMPREHEN METABOLIC PANEL: CPT | Performed by: EMERGENCY MEDICINE

## 2020-01-01 PROCEDURE — 97166 OT EVAL MOD COMPLEX 45 MIN: CPT

## 2020-01-01 PROCEDURE — 85018 HEMOGLOBIN: CPT

## 2020-01-01 PROCEDURE — 99233 SBSQ HOSP IP/OBS HIGH 50: CPT | Performed by: HOSPITALIST

## 2020-01-01 PROCEDURE — 99214 OFFICE O/P EST MOD 30 MIN: CPT | Performed by: FAMILY MEDICINE

## 2020-01-01 PROCEDURE — 85652 RBC SED RATE AUTOMATED: CPT

## 2020-01-01 PROCEDURE — 95816 EEG AWAKE AND DROWSY: CPT | Performed by: OTHER

## 2020-01-01 PROCEDURE — 82803 BLOOD GASES ANY COMBINATION: CPT

## 2020-01-01 PROCEDURE — 82375 ASSAY CARBOXYHB QUANT: CPT

## 2020-01-01 RX ORDER — TRANEXAMIC ACID 100 MG/ML
5 INJECTION, SOLUTION INTRAVENOUS ONCE
Status: COMPLETED | OUTPATIENT
Start: 2020-01-01 | End: 2020-01-01

## 2020-01-01 RX ORDER — LIDOCAINE HYDROCHLORIDE 10 MG/ML
5 INJECTION, SOLUTION EPIDURAL; INFILTRATION; INTRACAUDAL; PERINEURAL AS NEEDED
Status: DISCONTINUED | OUTPATIENT
Start: 2020-01-01 | End: 2020-01-01

## 2020-01-01 RX ORDER — ACETAMINOPHEN 325 MG/1
650 TABLET ORAL EVERY 6 HOURS PRN
Status: DISCONTINUED | OUTPATIENT
Start: 2020-01-01 | End: 2020-01-01

## 2020-01-01 RX ORDER — IPRATROPIUM BROMIDE AND ALBUTEROL SULFATE 2.5; .5 MG/3ML; MG/3ML
3 SOLUTION RESPIRATORY (INHALATION) EVERY 6 HOURS PRN
Status: DISCONTINUED | OUTPATIENT
Start: 2020-01-01 | End: 2020-01-01

## 2020-01-01 RX ORDER — ACETAZOLAMIDE 500 MG/1
500 CAPSULE, EXTENDED RELEASE ORAL 2 TIMES DAILY
Status: COMPLETED | OUTPATIENT
Start: 2020-01-01 | End: 2020-01-01

## 2020-01-01 RX ORDER — METHYLPREDNISOLONE SODIUM SUCCINATE 40 MG/ML
40 INJECTION, POWDER, LYOPHILIZED, FOR SOLUTION INTRAMUSCULAR; INTRAVENOUS EVERY 12 HOURS
Status: DISCONTINUED | OUTPATIENT
Start: 2020-01-01 | End: 2020-01-01

## 2020-01-01 RX ORDER — DILTIAZEM HYDROCHLORIDE 5 MG/ML
10 INJECTION INTRAVENOUS ONCE
Status: COMPLETED | OUTPATIENT
Start: 2020-01-01 | End: 2020-01-01

## 2020-01-01 RX ORDER — SPIRONOLACTONE 25 MG/1
25 TABLET ORAL EVERY MORNING
Status: DISCONTINUED | OUTPATIENT
Start: 2020-01-01 | End: 2020-01-01

## 2020-01-01 RX ORDER — METOPROLOL SUCCINATE 25 MG/1
25 TABLET, EXTENDED RELEASE ORAL
Status: DISCONTINUED | OUTPATIENT
Start: 2020-01-01 | End: 2020-01-01

## 2020-01-01 RX ORDER — ONDANSETRON 2 MG/ML
4 INJECTION INTRAMUSCULAR; INTRAVENOUS EVERY 4 HOURS PRN
Status: DISCONTINUED | OUTPATIENT
Start: 2020-01-01 | End: 2020-01-01

## 2020-01-01 RX ORDER — MYCOPHENOLATE MOFETIL 500 MG/1
1000 TABLET ORAL 2 TIMES DAILY
Status: DISCONTINUED | OUTPATIENT
Start: 2020-01-01 | End: 2020-01-01

## 2020-01-01 RX ORDER — PREDNISONE 20 MG/1
40 TABLET ORAL
Status: DISCONTINUED | OUTPATIENT
Start: 2020-01-01 | End: 2020-01-01

## 2020-01-01 RX ORDER — DILTIAZEM HYDROCHLORIDE 5 MG/ML
INJECTION INTRAVENOUS
Status: DISPENSED
Start: 2020-01-01 | End: 2020-01-01

## 2020-01-01 RX ORDER — LEVOTHYROXINE SODIUM 88 UG/1
TABLET ORAL
Qty: 90 TABLET | Refills: 0 | Status: SHIPPED | OUTPATIENT
Start: 2020-01-01

## 2020-01-01 RX ORDER — ONDANSETRON 2 MG/ML
INJECTION INTRAMUSCULAR; INTRAVENOUS
Status: COMPLETED
Start: 2020-01-01 | End: 2020-01-01

## 2020-01-01 RX ORDER — DEXTROSE MONOHYDRATE 25 G/50ML
50 INJECTION, SOLUTION INTRAVENOUS
Status: DISCONTINUED | OUTPATIENT
Start: 2020-01-01 | End: 2020-01-01 | Stop reason: SDUPTHER

## 2020-01-01 RX ORDER — HEPARIN SODIUM 5000 [USP'U]/ML
5000 INJECTION, SOLUTION INTRAVENOUS; SUBCUTANEOUS EVERY 8 HOURS SCHEDULED
Status: DISCONTINUED | OUTPATIENT
Start: 2020-01-01 | End: 2020-01-01

## 2020-01-01 RX ORDER — MELATONIN
3 NIGHTLY
Status: DISCONTINUED | OUTPATIENT
Start: 2020-01-01 | End: 2020-01-01

## 2020-01-01 RX ORDER — MYCOPHENOLATE MOFETIL 250 MG/1
1000 CAPSULE ORAL
Status: DISCONTINUED | OUTPATIENT
Start: 2020-01-01 | End: 2020-01-01

## 2020-01-01 RX ORDER — IPRATROPIUM BROMIDE AND ALBUTEROL SULFATE 2.5; .5 MG/3ML; MG/3ML
3 SOLUTION RESPIRATORY (INHALATION)
Status: DISCONTINUED | OUTPATIENT
Start: 2020-01-01 | End: 2020-01-01

## 2020-01-01 RX ORDER — METOPROLOL TARTRATE 5 MG/5ML
5 INJECTION INTRAVENOUS EVERY 6 HOURS
Status: DISCONTINUED | OUTPATIENT
Start: 2020-01-01 | End: 2020-01-01

## 2020-01-01 RX ORDER — ETOMIDATE 2 MG/ML
15 INJECTION INTRAVENOUS ONCE
Status: COMPLETED | OUTPATIENT
Start: 2020-01-01 | End: 2020-01-01

## 2020-01-01 RX ORDER — ACETAMINOPHEN 160 MG/5ML
650 SOLUTION ORAL EVERY 6 HOURS PRN
Status: DISCONTINUED | OUTPATIENT
Start: 2020-01-01 | End: 2020-01-01

## 2020-01-01 RX ORDER — POTASSIUM CHLORIDE 20 MEQ/1
40 TABLET, EXTENDED RELEASE ORAL EVERY 4 HOURS
Status: COMPLETED | OUTPATIENT
Start: 2020-01-01 | End: 2020-01-01

## 2020-01-01 RX ORDER — CHLORHEXIDINE GLUCONATE 0.12 MG/ML
15 RINSE ORAL
Status: DISCONTINUED | OUTPATIENT
Start: 2020-01-01 | End: 2020-01-01

## 2020-01-01 RX ORDER — POTASSIUM CHLORIDE 20 MEQ/1
40 TABLET, EXTENDED RELEASE ORAL ONCE
Status: COMPLETED | OUTPATIENT
Start: 2020-01-01 | End: 2020-01-01

## 2020-01-01 RX ORDER — METHYLPREDNISOLONE SODIUM SUCCINATE 40 MG/ML
40 INJECTION, POWDER, LYOPHILIZED, FOR SOLUTION INTRAMUSCULAR; INTRAVENOUS EVERY 8 HOURS
Status: DISCONTINUED | OUTPATIENT
Start: 2020-01-01 | End: 2020-01-01

## 2020-01-01 RX ORDER — SODIUM CHLORIDE 9 MG/ML
INJECTION, SOLUTION INTRAVENOUS CONTINUOUS
Status: DISCONTINUED | OUTPATIENT
Start: 2020-01-01 | End: 2020-01-01

## 2020-01-01 RX ORDER — DEXTROSE MONOHYDRATE 25 G/50ML
50 INJECTION, SOLUTION INTRAVENOUS
Status: DISCONTINUED | OUTPATIENT
Start: 2020-01-01 | End: 2020-01-01

## 2020-01-01 RX ORDER — ATORVASTATIN CALCIUM 40 MG/1
40 TABLET, FILM COATED ORAL DAILY
Status: DISCONTINUED | OUTPATIENT
Start: 2020-01-01 | End: 2020-01-01

## 2020-01-01 RX ORDER — ASPIRIN 81 MG/1
81 TABLET ORAL DAILY
Status: DISCONTINUED | OUTPATIENT
Start: 2020-01-01 | End: 2020-01-01

## 2020-01-01 RX ORDER — METOCLOPRAMIDE HYDROCHLORIDE 5 MG/ML
10 INJECTION INTRAMUSCULAR; INTRAVENOUS EVERY 8 HOURS PRN
Status: DISCONTINUED | OUTPATIENT
Start: 2020-01-01 | End: 2020-01-01

## 2020-01-01 RX ORDER — GABAPENTIN 300 MG/1
1200 CAPSULE ORAL 2 TIMES DAILY
Qty: 720 CAPSULE | Refills: 1 | Status: SHIPPED | OUTPATIENT
Start: 2020-01-01

## 2020-01-01 RX ORDER — ECHINACEA PURPUREA EXTRACT 125 MG
1 TABLET ORAL
Status: DISCONTINUED | OUTPATIENT
Start: 2020-01-01 | End: 2020-01-01

## 2020-01-01 RX ORDER — ENOXAPARIN SODIUM 100 MG/ML
40 INJECTION SUBCUTANEOUS DAILY
Status: DISCONTINUED | OUTPATIENT
Start: 2020-01-01 | End: 2020-01-01

## 2020-01-01 RX ORDER — HYDROMORPHONE HYDROCHLORIDE 1 MG/ML
0.5 INJECTION, SOLUTION INTRAMUSCULAR; INTRAVENOUS; SUBCUTANEOUS EVERY 30 MIN PRN
Status: ACTIVE | OUTPATIENT
Start: 2020-01-01 | End: 2020-01-01

## 2020-01-01 RX ORDER — ACETAMINOPHEN 650 MG/1
650 SUPPOSITORY RECTAL EVERY 6 HOURS PRN
Status: DISCONTINUED | OUTPATIENT
Start: 2020-01-01 | End: 2020-01-01

## 2020-01-01 RX ORDER — HEPARIN SODIUM AND DEXTROSE 10000; 5 [USP'U]/100ML; G/100ML
INJECTION INTRAVENOUS CONTINUOUS
Status: DISCONTINUED | OUTPATIENT
Start: 2020-01-01 | End: 2020-01-01

## 2020-01-01 RX ORDER — PHENYLEPHRINE HCL IN 0.9% NACL 50MG/250ML
PLASTIC BAG, INJECTION (ML) INTRAVENOUS CONTINUOUS
Status: DISCONTINUED | OUTPATIENT
Start: 2020-01-01 | End: 2020-01-01

## 2020-01-01 RX ORDER — PHENYLEPHRINE HCL IN 0.9% NACL 50MG/250ML
PLASTIC BAG, INJECTION (ML) INTRAVENOUS
Status: DISCONTINUED
Start: 2020-01-01 | End: 2020-01-01

## 2020-01-01 RX ORDER — ATORVASTATIN CALCIUM 40 MG/1
TABLET, FILM COATED ORAL
Qty: 90 TABLET | Refills: 0 | Status: SHIPPED | OUTPATIENT
Start: 2020-01-01

## 2020-01-01 RX ORDER — MIDAZOLAM HYDROCHLORIDE 1 MG/ML
2 INJECTION INTRAMUSCULAR; INTRAVENOUS EVERY 5 MIN PRN
Status: DISCONTINUED | OUTPATIENT
Start: 2020-01-01 | End: 2020-01-01

## 2020-01-01 RX ORDER — BISACODYL 10 MG
10 SUPPOSITORY, RECTAL RECTAL
Status: DISCONTINUED | OUTPATIENT
Start: 2020-01-01 | End: 2020-01-01

## 2020-01-01 RX ORDER — HEPARIN SODIUM AND DEXTROSE 10000; 5 [USP'U]/100ML; G/100ML
12 INJECTION INTRAVENOUS ONCE
Status: COMPLETED | OUTPATIENT
Start: 2020-01-01 | End: 2020-01-01

## 2020-01-01 RX ORDER — TORSEMIDE 20 MG/1
TABLET ORAL
Qty: 180 TABLET | Refills: 1 | Status: SHIPPED | OUTPATIENT
Start: 2020-01-01

## 2020-01-01 RX ORDER — DULOXETIN HYDROCHLORIDE 30 MG/1
30 CAPSULE, DELAYED RELEASE ORAL 2 TIMES DAILY
Status: DISCONTINUED | OUTPATIENT
Start: 2020-01-01 | End: 2020-01-01

## 2020-01-01 RX ORDER — GABAPENTIN 300 MG/1
1200 CAPSULE ORAL 2 TIMES DAILY
Status: DISCONTINUED | OUTPATIENT
Start: 2020-01-01 | End: 2020-01-01

## 2020-01-01 RX ORDER — LEVOTHYROXINE SODIUM 88 UG/1
88 TABLET ORAL
Status: DISCONTINUED | OUTPATIENT
Start: 2020-01-01 | End: 2020-01-01

## 2020-01-01 RX ORDER — DOCUSATE SODIUM 100 MG/1
100 CAPSULE, LIQUID FILLED ORAL EVERY MORNING
Status: DISCONTINUED | OUTPATIENT
Start: 2020-01-01 | End: 2020-01-01

## 2020-01-01 RX ORDER — LEVOTHYROXINE SODIUM 88 UG/1
TABLET ORAL
Qty: 90 TABLET | Refills: 0 | Status: SHIPPED | OUTPATIENT
Start: 2020-01-01 | End: 2020-01-01

## 2020-01-01 RX ORDER — HEPARIN SODIUM 5000 [USP'U]/ML
60 INJECTION INTRAVENOUS; SUBCUTANEOUS ONCE
Status: COMPLETED | OUTPATIENT
Start: 2020-01-01 | End: 2020-01-01

## 2020-01-01 RX ORDER — VANCOMYCIN 2 GRAM/500 ML IN 0.9 % SODIUM CHLORIDE INTRAVENOUS
25 ONCE
Status: COMPLETED | OUTPATIENT
Start: 2020-01-01 | End: 2020-01-01

## 2020-01-01 RX ORDER — SODIUM CHLORIDE/ALOE VERA
GEL (GRAM) NASAL AS NEEDED
Status: DISCONTINUED | OUTPATIENT
Start: 2020-01-01 | End: 2020-01-01

## 2020-01-01 RX ORDER — SODIUM PHOSPHATE, DIBASIC AND SODIUM PHOSPHATE, MONOBASIC 7; 19 G/133ML; G/133ML
1 ENEMA RECTAL ONCE AS NEEDED
Status: DISCONTINUED | OUTPATIENT
Start: 2020-01-01 | End: 2020-01-01

## 2020-01-01 RX ORDER — LEVOTHYROXINE SODIUM 88 UG/1
88 TABLET ORAL
Qty: 90 TABLET | Refills: 0 | Status: SHIPPED | OUTPATIENT
Start: 2020-01-01 | End: 2020-01-01

## 2020-01-01 RX ORDER — GLIMEPIRIDE 1 MG/1
TABLET ORAL
Qty: 90 TABLET | Refills: 1 | Status: SHIPPED | OUTPATIENT
Start: 2020-01-01 | End: 2020-01-01

## 2020-01-01 RX ORDER — ATORVASTATIN CALCIUM 40 MG/1
TABLET, FILM COATED ORAL
Qty: 90 TABLET | Refills: 0 | Status: SHIPPED | OUTPATIENT
Start: 2020-01-01 | End: 2020-01-01

## 2020-01-01 RX ORDER — SPIRONOLACTONE 25 MG/1
TABLET ORAL
Qty: 90 TABLET | Refills: 3 | Status: SHIPPED | OUTPATIENT
Start: 2020-01-01

## 2020-01-01 RX ORDER — TORSEMIDE 20 MG/1
20 TABLET ORAL
Status: DISCONTINUED | OUTPATIENT
Start: 2020-01-01 | End: 2020-01-01

## 2020-01-01 RX ORDER — ONDANSETRON 2 MG/ML
4 INJECTION INTRAMUSCULAR; INTRAVENOUS EVERY 6 HOURS PRN
Status: DISCONTINUED | OUTPATIENT
Start: 2020-01-01 | End: 2020-01-01

## 2020-01-01 RX ORDER — POLYETHYLENE GLYCOL 3350 17 G/17G
17 POWDER, FOR SOLUTION ORAL DAILY PRN
Status: DISCONTINUED | OUTPATIENT
Start: 2020-01-01 | End: 2020-01-01

## 2020-01-28 NOTE — TELEPHONE ENCOUNTER
PT/OT order placed. Please give patient order for PT and OT bilateral upper extremities.     Severa Loveless          From: Almond Goldmann   Sent: 1/27/2020   1:44 PM CST   To: Ubaldo Lino MD, Venkatesh Carreon MD   Subject: PT/OT order

## 2020-02-03 NOTE — PROGRESS NOTES
OCCUPATIONAL THERAPY UPPER EXTREMITY EVALUATION   Referring Physician: Dr. 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 . The results of the objective tests and measures show decreased sensation, weakness and fair to poor fine coordination bilaterally. .  Functional deficits include but are not limited to : inability to feel what he is trying to . Luan Rehman was instructed in and issued a HEP for: sensory re-education, strengthening and fine coordination tasks.     Charges: OT Eval: Moderate Complexity, 1x TE,     Total Timed Treatment:50 min     Total Treatment Time: 55 min     Based on clinical rationale and furnished under this plan of treatment and while under my care.     X___________________________________________________ Date____________________    Certification From: 0/8/5586  To:5/3/2020

## 2020-02-25 NOTE — PROGRESS NOTES
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 Ro Swan  Next MD visit: none scheduled Beige twist stik bilat 3 directions. yel putty  and pinch bilat      Review and upgrade HEP adding sensory retraining w/ varying sensory objects Discussed proper sleep position to decrease stress on R ulnar nerve.       Object manip in dry pasta bilat

## 2020-03-03 NOTE — PROGRESS NOTES
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 Shamar Abebe  Next MD visit: none scheduled bilat  Moisturize skin     yel putty  and pinch bilat AROM bilat wrists and fingers velcro  brd bilat tip pinch     velcro  brd bilat tip pinch  Beige twist stik x3 directions yel putty /pinch bilat  bilat thumb flx thru putty     Stella Arbour

## 2020-03-10 NOTE — PROGRESS NOTES
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 Rashaun Odom  Next MD visit: none scheduled directions yel putty /pinch bilat  bilat thumb flx thru putty Beige twist stik 3 directions    Beige twist stik bilat 3 directions.  yel putty  and pinch bilat Red twist stik 3 directions Magnet x's bilat tip pinch    Review and upgrade HEP adding s

## 2020-03-17 NOTE — TELEPHONE ENCOUNTER
Contacted pt to discuss department's initiative to protect all non-essential and high risk patients from COVID-19 exposure. Discussed recommendations relative to pt's home program and for Pt to therapist with any questions he may have. Explained that the clinic is cancelling visits for the next two weeks. Future appts are to be determined on a week-by-week basis and that therapist/staff will contact patient when it is appropriate to reschedule/resume visits. Pt reports understanding and agreement to plan.

## 2020-04-21 NOTE — TELEPHONE ENCOUNTER
Received request for refill of Levothyroxine 88mcg and pt is due for refill of Gabapentin 300mg. Last OV 8/27/19 pt has appointment scheduled 6/12/2020, last refill of Levothyroxine 1/20/20 #90 last refill of gabapentin 10/29/19 #270 + 1.

## 2020-04-28 NOTE — PROGRESS NOTES
Subjective     HPI:   Cathie Ramos verbally consents to a Virtual/Telephone Check-In service on 04/28/20. Patient understands and accepts financial responsibility for any deductible, co-insurance and/or co-pays associated with this service.  This visit Diagnoses and all orders for this visit:    Controlled type 2 diabetes mellitus with complication, without long-term current use of insulin (HCC)    Chronic combined systolic and diastolic congestive heart failure (HCC)    Essential hypertension    Other

## 2020-06-12 NOTE — PROGRESS NOTES
HPI:   Lorne Kulkarni is a 79year old male who presents for a Medicare Subsequent Annual Wellness visit (Pt already had Initial Annual Wellness). No recent hospitalizations. Both ears feel plugged- no pain. Does not wear hearing aids.    Does not n difficulties based on screening of functional status. Preparing your meals: Cannot do without help  He has difficulties Managing Money/Bills based on screening of functional status.    Managing money/bills: Need some help  He has difficulties Shopping for 14.4      Smokeless tobacco: Never Used       Mr. Bety Ricketts already takes aspirin and has it on his medication list.   CAGE Alcohol screening   Amie Perez was screened for Alcohol abuse and had a score of 0 so is at low risk.      Patient Care Team: Pa extremity    Wt Readings from Last 3 Encounters:  06/12/20 : 176 lb (79.8 kg)  02/19/20 : 197 lb (89.4 kg)  01/10/20 : 194 lb (88 kg)     Last Cholesterol Labs:   Lab Results   Component Value Date    CHOLEST 123 05/08/2020    HDL 40 05/08/2020    LDL 61 0 (Patient taking differently: Take 25 mg by mouth every morning.  )  ipratropium-albuterol 0.5-2.5 (3) MG/3ML Inhalation Solution, Take 3 mL by nebulization 4 (four) times daily.  (Patient taking differently: Take 3 mL by nebulization every 6 (six) hours as other surgical history (02/11/2005); other surgical history (1/30/2015); revise median n/carpal tunnel surg (Left, 4/1/2016); angiogram (06/16/2017); valve replacement (01/30/2015); valve repair (07/21/2017); other surgical history; endovas repair, infrare mass index is 23.22 kg/m² as calculated from the following:    Height as of this encounter: 73\". Weight as of this encounter: 176 lb (79.8 kg).     Medicare Hearing Assessment  (Required for AWV/SWV)    Decreased hearing on both left and right with fing • FLUAD High Dose 72 yr and older (69849) 11/16/2017, 10/11/2018   • Fluzone Vaccine Medicare () 09/26/2016   • HIGH DOSE FLU 65 YRS AND OLDER PRSV FREE SINGLE D (30254) FLU CLINIC 09/26/2016   • Pneumococcal (Prevnar 13) 09/26/2016   • Pneumovax 23 ROXI, 6/12/2020     General Health     In the past six months, have you lost more than 10 pounds without trying?: 3 - Don't know  Has your appetite been poor?: Yes  How would you describe your daily physical activity?: Light  How would you describe your cu Please get every year    Pneumococcal 13 (Prevnar)  Covered Once after 65 09/26/2016 Please get once after your 65th birthday    Pneumococcal 23 (Pneumovax)  Covered Once after 65 01/01/2014 Please get once after your 65th birthday    Hepatitis B for Moder LDL-CHOLESTEROL (mg/dL (calc))   Date Value   11/18/2015 75    No flowsheet data found. Dilated Eye exam  Annually Data entered on: 11/7/2017   Last Dilated Eye Exam 11/7/2017     No flowsheet data found.        COPD      Spirometry Testing Annually

## 2020-06-12 NOTE — PATIENT INSTRUCTIONS
Becka Campbell's SCREENING SCHEDULE   Tests on this list are recommended by your physician but may not be covered, or covered at this frequency, by your insurer. Please check with your insurance carrier before scheduling to verify coverage.     aJcqueline Huerta No results found for this or any previous visit.  Limited to patients who meet one of the following criteria:   • Men who are 73-68 years old and have smoked more than 100 cigarettes in their lifetime   • Anyone with a family history    Colorectal Cancer Sc factors:   End-stage renal disease   Hemophiliacs who received Factor VIII or IX concentrates   Clients of institutions for the mentally retarded   Persons who live in the same house as a HepB virus carrier   Homosexual men   Illicit injectable drug abuser

## 2020-06-18 PROBLEM — H93.299 ABNORMAL AUDITORY PERCEPTION, UNSPECIFIED LATERALITY: Status: ACTIVE | Noted: 2020-01-01

## 2020-09-13 NOTE — ED PROVIDER NOTES
Patient Seen in: [facility] Emergency Department      History   Patient presents with:  GI Bleeding    Stated Complaint: GI Bleeding for two hours. Clots.  Pt pale on O2    HPI    23-year-old male presents to the emergency department with persistent • Pericarditis    • Peripheral vascular disease (HCC)    • Presence of other cardiac implants and grafts    • Renal disorder     hx of nephrolithiasis (hx of multiple UTI's)   • S/P pericardial surgery     On 2/13/2018: re-do sternotomy and pericardectomy Frequency: 4 or more times a week      Drinks per session: 1 or 2      Binge frequency: Never    Drug use: No             Review of Systems   All other systems reviewed and are negative. Positive for stated complaint: GI Bleeding for two hours.  Cl A/G Ratio 0.8 (*)     All other components within normal limits   CBC W/ DIFFERENTIAL - Abnormal; Notable for the following components:    HGB 10.5 (*)     HCT 35.6 (*)     MCHC 29.5 (*)     Lymphocyte Absolute 0.31 (*)     All other components within nor Dimitri 96    Schedule an appointment as soon as possible for a visit      [address] [address] MARE Escobar [id number] [phone]  Schedule an appointment as soon

## 2020-09-18 NOTE — PROGRESS NOTES
Chief Complaint  This information was obtained from the patient  Patient is here for a initial visit for a coccyx.  wife states from sitting and wife been applying a neosporine    Allergies  Iodinated Contrast- Oral and IV Dye (Reaction: Tightness in th stenosis  • Aortic valve replaced  • Arthritis  • Calculus of kidney  • Change in hair  • Chronic atrial fibrillation (HCC)  • Congestive heart disease (HCC)  • Constipation  • Constrictive pericarditis  On 2/13/2018: re-do sternotomy and pericardectomy  • HEART CABG REDO N/A 2/13/2018  Performed by Chang Parisi MD at . [address] 2006  ventral hernia repair; complex ventral herniorrhaphy with composix mesh  • OTHER SURGICAL HISTORY 02/11/2005  surgery for abdominal aortic aneurysm  • OTHER AZUCENA diabetes  Hypothyroidism  Congestive heart disease  Pericarditis  Renal disorder  Neuropathy  Aortic Stenosis  Arthritis  Calculus of Kidney  A fib  Coronary atherosclerosis of unspecified type of vessel, native or graft  Thyroid Disorder  Heart murmur  Hy aerosol,spray  fluticasone propionate - nasal 50 mcg/actuation spray,suspension  aspirin - oral 81 mg tablet,chewable  spironolactone - oral 25 mg tablet  duloxetine - oral 30 mg capsule, delayed rel sprinkle  levothyroxine - oral 88 mcg tablet        Obje devitalized tissue: biofilm and slough. The following instrument(s) were used: curette. Pain control was achieved using 4% Lido. A time out was not conducted prior to the start of the procedure. A minimal amount of bleeding was controlled with pressure.  Alline [place]

## 2020-10-02 PROBLEM — E87.1 HYPONATREMIA: Status: ACTIVE | Noted: 2020-01-01

## 2020-10-02 PROBLEM — D64.9 ANEMIA: Status: ACTIVE | Noted: 2020-01-01

## 2020-10-02 PROBLEM — E87.3 METABOLIC ALKALOSIS: Status: ACTIVE | Noted: 2020-01-01

## 2020-10-02 PROBLEM — R79.89 AZOTEMIA: Status: ACTIVE | Noted: 2020-01-01

## 2020-10-02 PROBLEM — R09.02 HYPOXEMIA: Status: ACTIVE | Noted: 2020-01-01

## 2020-10-02 PROBLEM — R73.9 HYPERGLYCEMIA: Status: ACTIVE | Noted: 2020-01-01

## 2020-10-02 NOTE — ED INITIAL ASSESSMENT (HPI)
To the ED from [address] [place] [address] with wife with c/o weakness, MARQUES, no appetite and possible pnuemonia. Seen at wound clinic for buttock wounds. Over last week has increase in faigue, not eating and confusion with speech. Increase in MARQUES and + cough.  Pulmonary M

## 2020-10-02 NOTE — ED NOTES
Patient resting on stretcher, in no apparent distress with wife at bedside. Patient and wife updated on plan of care for CT at this time.

## 2020-10-02 NOTE — PROGRESS NOTES
Chief Complaint  This information was obtained from the patient  Patient is here for a follow up for coccyx. Patients denies any pain on the wound and no other issues .  new wound on the left side of the buttock    Allergies  Iodinated Contrast- Oral and swelling/malodor/purulent drainage. Last A1c value was 5.8% done 6/23/2020. PMH - Dm2 with neuropathy, COPD, DJD / arthritis,.  Hypertension, Hypothyroidism, Valvular heart disease, Hyperlipidemia  PSH - appendectomy, cholecystectomy, heart valve repl Wears glasses    Past Surgical History:  Procedure Laterality Date  • ANGIOGRAM 06/16/2017  [facility]  • APPENDECTOMY  • ARTHROSCOPY OF JOINT UNLISTED Left 1994  knee  • CARPAL TUNNEL RELEASE Left 4/1/2016  Performed by Olivier Orozco MD at [facility] (GI)  Neurological  Endocrine  Hematologic/Lymphatic    General Notes:  per HPI    Additional Information  Medication reconciliation completed at today's visit. : Yes        Objective    Wound Assessment(s)  Wound #3 Coccyx is an acute Stage 1 Pressure Inj Unspecified abnormalities of gait and mobility  (Encounter Diagnosis) I10 - Essential (primary) hypertension        Plan    Wound Orders:  Wound #3 Coccyx    Off-Loading:  EHOB cushion - WAFFLE CUSHION    Follow-Up Appointments:  Return Appointment in 2 we

## 2020-10-02 NOTE — ED PROVIDER NOTES
Patient Seen in: [facility] Emergency Department      History   Patient presents with:  Difficulty Breathing    Stated Complaint: MARQUES    HPI    This is 27-year-old man history of COPD, peripheral vascular disease hypertension, bovine AVR, chronic bi pericardial surgery     On 2/13/2018: re-do sternotomy and pericardectomy   • Shortness of breath    • Sleep apnea     was wearing CPAP at [facility] - hasn't been using it since May 2017   • Sleep disturbance    • Stress    • Thyroid disease    • Type 2 diabetes are as noted in HPI. Constitutional and vital signs reviewed. All other systems reviewed and negative except as noted above.     Physical Exam     ED Triage Vitals [10/02/20 1412]   BP 96/57   Pulse 71   Resp 24   Temp 97.3 °F (36.3 °C)   Temp src Ora Absolute 10.47 (*)     Lymphocyte Absolute 0.18 (*)     All other components within normal limits   TROPONIN I - Normal   RAPID SARS-COV-2 BY PCR - Normal   CBC WITH DIFFERENTIAL WITH PLATELET    Narrative:      The following orders were created for panel o (CST): Armin Montenegro, DO on 10/02/2020 at 3:06 PM             MDM      This is a 27-year-old man history of COPD, mitral valve replacement with bovine valve, hypertension peripheral vascular disease, here with worsening cough, shortness of breath, generally

## 2020-10-03 PROBLEM — R62.7 FAILURE TO THRIVE IN ADULT: Status: ACTIVE | Noted: 2020-01-01

## 2020-10-03 PROBLEM — E86.0 DEHYDRATION: Status: ACTIVE | Noted: 2020-01-01

## 2020-10-03 NOTE — PLAN OF CARE
Assumed patient care at 0730. Vital signs stable. Patient alert and oriented x 4. Patient denies pain. Turned q 2 on pillows. Patient very dyspneic with exertion. Patient and his wife update on plan of care.

## 2020-10-03 NOTE — H&P
[facility]  History and Physical     [facility] Patient Status:  Emergency    1940 MRN [id number]   Location [address] Attending Christin Bowen MD   Hosp Day # 0 PCP Zenaida Marie MD     Chief Complain Hyperlipidemia LDL goal <70    • Hypertension, essential, benign    • Leaking of urine    • Loss of appetite    • Microalbuminuria due to type 2 diabetes mellitus (HCC)    • Neuropathy     hands, legs, feet   • Pain in joints    • Pericarditis    • Periphe years ago. His smoking use included cigarettes. He has a 100.00 pack-year smoking history. He has never used smokeless tobacco. He reports current alcohol use. He reports that he does not use drugs.     Family History:   Family History   Problem Relation Ag every morning.  ), Disp: 30 tablet, Rfl: 3    •  ipratropium-albuterol 0.5-2.5 (3) MG/3ML Inhalation Solution, Take 3 mL by nebulization 4 (four) times daily.  (Patient taking differently: Take 3 mL by nebulization every 6 (six) hours as needed.  ), Disp: 2 deficits. CNII-XII grossly intact. Musculoskeletal: Moves all extremities. Extremities: No edema or cyanosis. Integument: No rashes or lesions. Psychiatric: Appropriate mood and affect.       Diagnostic Data:      Labs:  Recent Labs   Lab 10/02/20  144

## 2020-10-03 NOTE — PROGRESS NOTES
[facility]  Progress Note     Mich Werner Patient Status:  Inpatient    1940 MRN [id number]   [facility] 5NW-A Attending Oli Mcclellan MD   Hosp Day # 0 PCP Javi Schaeffer MD     Chief Complaint: PNA    S: Patient is piperacillin-tazobactam  3.375 g Intravenous Q8H   • Umeclidinium Bromide  1 puff Inhalation Daily   • mycophenolate mofetil  1,000 mg Oral BID AC   • aspirin  81 mg Oral Daily   • atorvastatin  40 mg Oral Daily   • docusate sodium  100 mg Oral QAM   • DUL

## 2020-10-03 NOTE — PROGRESS NOTES
NURSING ADMISSION NOTE      Patient admitted via Cart  Oriented to room. Safety precautions initiated. Bed in low position. Call light in reach. Admission navigator completed. Med rec verified. MD paged for orders.      Pt is A&O x4 but can be for

## 2020-10-03 NOTE — PROGRESS NOTES
[facility]  Progress Note    Estrada Grande Patient Status:  Inpatient    1940 MRN [id number]   [place] 5NW-A Attending Yakelin Fleming MD   [address] Day # 0 PCP Crissy Hopkins MD     Subjective:  Estrada Grande is a(n) 79 97. 2   DENNIS Positive   SITE Right Radial   DEV Bi-PAP   THGB 7.8*       Invalid input(s): CKTOTAL, TROPONINI, TROPONINT, CKMBINDEX    Cultures:  COVID negative  Blood pending    Radiology:  Chest CT 10/2- diffuse emphysema and chronic interstitial changes chewable tab 4 tablet, 4 tablet, Oral, Q15 Min PRN    Or    •  dextrose 50 % injection 50 mL, 50 mL, Intravenous, Q15 Min PRN    Or    •  glucose (DEX4) oral liquid 30 g, 30 g, Oral, Q15 Min PRN    Or    •  Glucose-Vitamin C (DEX-4) chewable tab 8 tablet,

## 2020-10-04 NOTE — PHYSICAL THERAPY NOTE
Order received for Physical Therapy. Attempted to see patient this PM for PT evaluation patient currently refusing due to fatigue, family present. Will reattempt PT services as able.

## 2020-10-04 NOTE — PLAN OF CARE
Assumed care of pt at [address] [place] [address], received on 2L per nc. Pt with shallow breathing. Lungs diminished with left posterior base with crackles noted. Iv sl. Remains with iv antibx and neb tx. Will encourage use of bipap. Bed alarm on will monitor closely.

## 2020-10-04 NOTE — PROGRESS NOTES
[facility]  Progress Note     Jarod Singh Patient Status:  Inpatient    1940 MRN [id number]   [facility] 5NW-A Attending Lexus Vergara MD   [facility] Day # 1 PCP Kimmie Emmanuel MD     Chief Complaint: PNA    S: Patient ha hours.    Imaging: Imaging data reviewed in Epic.     Medications:   • Potassium Chloride ER  40 mEq Oral Q4H   • ipratropium-albuterol  3 mL Nebulization Q6H [address]   • piperacillin-tazobactam  3.375 g Intravenous Q8H   • Umeclidinium Bromide  1 puff Inhalati

## 2020-10-04 NOTE — DIETARY MALNUTRITION NOTE
[facility]    NUTRITION INITIAL ASSESSMENT    Pt meets severe malnutrition criteria.     CRITERIA FOR MALNUTRITION DIAGNOSIS:  Criteria for severe malnutrition diagnosis: chronic illness related to wt loss greater than 5% in 1 month, energy intake less 96.2 kg (212 lb)  08/27/19 : 96.2 kg (212 lb)        NUTRITION:  Diet: general  Oral Supplements: Enlive BID    FOOD/NUTRITION RELATED HISTORY:  Appetite: poor  Intake: <50%  Intake Meeting Needs: No, but supplements to maximize  Food Allergies: No  Cultur

## 2020-10-04 NOTE — PROGRESS NOTES
[facility]  Progress Note    Cathie Ramos Patient Status:  Inpatient    1940 MRN [id number]   [facility] 5NW-A Attending Pernell Castleman, MD   [facility] Day # 1 PCP Rubens Dooley MD     Subjective:  Cathie Ramos is a(n) 79 ABGPHT 7.49*   JYGUUF6I 46*   VOKNI6M 151*   ABGHCO3 34.3*   ABGBE 9.8*   TEMP 97.2   DENNIS Positive   SITE Right Radial   DEV Bi-PAP   THGB 7.8*       Invalid input(s): CKTOTAL, TROPONINI, TROPONINT, CKMBINDEX    Cultures:  COVID negative  Blood pending liquid 15 g, 15 g, Oral, Q15 Min PRN    Or    •  Glucose-Vitamin C (DEX-4) chewable tab 4 tablet, 4 tablet, Oral, Q15 Min PRN    Or    •  dextrose 50 % injection 50 mL, 50 mL, Intravenous, Q15 Min PRN    Or    •  glucose (DEX4) oral liquid 30 g, 30 g, Oral

## 2020-10-04 NOTE — PLAN OF CARE
Assumed patient care at 0730. Vital signs stable. Patient alert and oriented x 4 but forgetful. Patient more lethargic as the day went on. Informed Dr. James Li. ABG's drawn and pt placed on bipap. Wife at bedside.

## 2020-10-05 NOTE — PROGRESS NOTES
Received pt on bipap 12/6 40%. Pt remained on for most of the evening and night except for a few short breaks. Nebs given Q6. Will continue to monitor.       10/05/20 0106   BiPAP   $ RT Standby Charge (per 15 min) 1  (bipap check, neb tx)   $ BiPAP in use

## 2020-10-05 NOTE — PHYSICAL THERAPY NOTE
PHYSICAL THERAPY EVALUATION - INPATIENT     Room Number: 525/525-A  Evaluation Date: 10/5/2020  Type of Evaluation: Initial  Physician Order: PT Eval and Treat    Presenting Problem: weakness, PNA  Reason for Therapy: Mobility Dysfunction and Dischar disease [facility])    • Presence of other cardiac implants and grafts    • Renal disorder     hx of nephrolithiasis (hx of multiple UTI's)   • S/P pericardial surgery     On 2/13/2018: re-do sternotomy and pericardectomy   • Shortness of breath    • Sleep apnea week prior to admit primarily in bed, prior ambulates with rw 25 in home without assist, reports was ind with ADLs wife can assist if needed    SUBJECTIVE  \"I am tired\"    Patient self-stated goal is go home    OBJECTIVE  Precautions: (monitor O2 sats) wheelchair)?: A Lot   -   Need to walk in hospital room?: A Lot   -   Climbing 3-5 steps with a railing?: A Lot       AM-PAC Score:  Raw Score: 14   Approx Degree of Impairment: 61.29%   Standardized Score (AM-PAC Scale): 38.1   CMS Modifier (G-Code): CL themselves as functional limitations in independent bed mobility, transfers, and gait. The patient is below baseline and would benefit from skilled inpatient PT to address the above deficits to assist patient in returning to prior to level of function.   D

## 2020-10-05 NOTE — PROGRESS NOTES
Assumed care of patient at approx 1930 following RN shift report. Patient requesting to come off of bipap at the beginning of the shift so that he could eat. Patient taken off and placed on 3L. Patient with decent appetite and no c/o nausea/vomiting.  Lena

## 2020-10-05 NOTE — PROGRESS NOTES
[facility]  Progress Note     Eula Christianson Patient Status:  Inpatient    1940 MRN [id number]   [facility] 5NW-A Attending Gray Hinojosa MD   [address] Day # 2 PCP Joann Valdovinos MD     Chief Complaint: PNA    S: Patient is for input(s): CK in the last 168 hours. Inflammatory Markers  No results for input(s): CRP, BITA, LDH, DDIMER in the last 168 hours. Imaging: Imaging data reviewed in Epic.     Medications:   • ipratropium-albuterol  3 mL Nebulization Q6H [address]   • piper

## 2020-10-05 NOTE — PROGRESS NOTES
[facility]  Progress Note    Javed Peterson Patient Status:  Inpatient    1940 MRN [id number]   [facility] 5NW-A Attending Good Powell MD   [facility] Day # 2 PCP Yessica Costa MD     Subjective:  Javed Peetrson is a(n) 79 34 24  --   --    BILT 0.4 0.5  --   --    TP 7.5 6.1*  --   --        No results for input(s): MG in the last 168 hours. Lab Results   Component Value Date    PHOS 2.4 (L) 10/02/2020        No results for input(s): PT, INR, PTT in the last 168 hours. tab 650 mg, 650 mg, Oral, Q6H PRN    •  glucose (DEX4) oral liquid 15 g, 15 g, Oral, Q15 Min PRN    Or    •  Glucose-Vitamin C (DEX-4) chewable tab 4 tablet, 4 tablet, Oral, Q15 Min PRN    Or    •  dextrose 50 % injection 50 mL, 50 mL, Intravenous, Q15 Min

## 2020-10-06 NOTE — PLAN OF CARE
Assumed patient at 0730. Vital signs stable. Patient lethargic this morning but oriented. Patient up to chair with sit to stand this afternoon and on 3L nasal canula. Patient eating more today and more alert once up to chair.   Patient incontinent of cara

## 2020-10-06 NOTE — PLAN OF CARE
VSS.  Patient lethargic again today, wakes up to verbal command but quickly falls asleep. Attempted to take off bipap around 10 and placed on 3l oxygen. O2 sats wnl,patient but he became pretty drowsy and abg's showed increased co2.   He was placed back o

## 2020-10-06 NOTE — PLAN OF CARE
A/OX4 but lethargic  3-4L O2 while awake, VSS, NSR per Tele  Bipap on all night while sleeping   Turned and repositioned for comfort  Will cont to monitor.

## 2020-10-06 NOTE — PROGRESS NOTES
[facility]  Progress Note    Olivia Linares Patient Status:  Inpatient    1940 MRN [id number]   [facility] 5NW-A Attending Christian Dixon MD   [address] Day # 3 PCP Gee Degroot MD     Subjective:  Olivia Linares is a(n) 79 GFRNAA 89 100  --   --    CA 9.9 8.9  --   --    ALB 2.3* 1.9*  --   --    * 135*  --   --    K 4.1 3.8 3.4* 4.0   CL 93* 96*  --   --    CO2 38.0* 37.0*  --   --    ALKPHO 113 100  --   --    AST 11* 17  --   --    ALT 34 24  --   --    BILT 0.4 (LOVENOX) 40 MG/0.4ML injection 40 mg, 40 mg, Subcutaneous, Daily    •  ondansetron HCl (ZOFRAN) injection 4 mg, 4 mg, Intravenous, Q6H PRN    •  Metoclopramide HCl (REGLAN) injection 10 mg, 10 mg, Intravenous, Q8H PRN    •  acetaminophen (TYLENOL) tab 650 lovenox  · ELEANOR to chair.   Started work with PT    Devonte john RN and Dr. Chelita Enamorado, [address] [facility] [place]

## 2020-10-06 NOTE — PROGRESS NOTES
[facility]  Progress Note     Shirley Alvarez Patient Status:  Inpatient    1940 MRN [id number]   [facility] 5NW-A Attending Montrell Dover MD   [facility] Day # 3 PCP Riddhi Aldana MD     Chief Complaint: PNA    S: Patient is PBNP 4,252*       Creatinine Kinase  No results for input(s): CK in the last 168 hours. Inflammatory Markers  No results for input(s): CRP, BITA, LDH, DDIMER in the last 168 hours. Imaging: Imaging data reviewed in Epic.     Medications:   • ipratrop

## 2020-10-06 NOTE — RESPIRATORY THERAPY NOTE
Pt received this AM at 1100. PT has BIPAP PRN and NOC at this time and wears 2L NC while off BIPAP. Pt was not very alert when taking ABG. Results show that CO2 is high and so is PO2.  Pt is to stay on BIPAP as much as possible with the exception of meals a

## 2020-10-06 NOTE — CM/SW NOTE
10/06/20 1600   CM/SW Referral Data   Referral Source Nurse   Reason for Referral Discharge planning   Informant Spouse   Patient Info   Patient's Mental Status Alert   Patient's [address]   Patient lives with Spouse; Children   Patient Stat

## 2020-10-07 NOTE — PROGRESS NOTES
[facility]  Progress Note    Jovi Bekc Patient Status:  Inpatient    1940 MRN [id number]   [facility] 5NW-A Attending Glory Haywood MD   [facility] Day # 4 PCP Clark Mccullough MD     Subjective:  Jovi Beck is a(n) 79 y 90.0   MCH 26.3 27.0 26.5   MCHC 28.7* 29.4* 29.5*   RDW 13.7 13.6 14.1   NEPRELIM 10.47* 7.92*  --    WBC 11.6* 9.0 12.0*   .0 238.0 345.0     Recent Labs   Lab 10/02/20  1445 10/03/20  0628 10/04/20  0609 10/04/20  1638 10/07/20  0641   * 1 immunosuppressives  · Wound care  · Prophylaxis- lovenox  · OOB to chair.   Started work with PT    Berry Chow.  10/7/2020  8:09 AM

## 2020-10-07 NOTE — PHYSICAL THERAPY NOTE
PHYSICAL THERAPY TREATMENT NOTE - INPATIENT    Room Number: 525/525-A     Session: 1   Number of Visits to Meet Established Goals: 5    Presenting Problem: weakness, PNA    History related to current admission: admitted from wound clinc being followed for implants and grafts    • Renal disorder     hx of nephrolithiasis (hx of multiple UTI's)   • S/P pericardial surgery     On 2/13/2018: re-do sternotomy and pericardectomy   • Shortness of breath    • Sleep apnea     was wearing CPAP at [facility] - hasn't been us Static Sitting: Fair -  Dynamic Sitting: Fair -           Static Standing: Poor +  Dynamic Standing: Poor +    ACTIVITY TOLERANCE                         O2 WALK                    A breaks between. Pt left in chair, needs met. RN made aware of above.      THERAPEUTIC EXERCISES  Lower Extremity Alternating marching  Ankle pumps  Knee extension  LAQ     Upper Extremity  - open/close, Scapular Retraction and shoulder roll s     Posi

## 2020-10-07 NOTE — PLAN OF CARE
Assumed care of patient at approx 1930 following RN shift report. Patient slightly drowsy but arousable. Patient received on bipap; tolerating fairly well. Vital signs stable, patient afebrile.  Patient off bipap at approx 2230-placed back on bipap by respi

## 2020-10-07 NOTE — DIETARY MALNUTRITION NOTE
[facility]    NUTRITION ASSESSMENT    Pt meets severe malnutrition criteria.     CRITERIA FOR MALNUTRITION DIAGNOSIS:  Criteria for severe malnutrition diagnosis: chronic illness related to wt loss greater than 5% in 1 month, energy intake less than 75 Encounters:  10/07/20 : 68 kg (150 lb)  09/13/20 : 79.8 kg (176 lb)  06/12/20 : 79.8 kg (176 lb)  02/19/20 : 89.4 kg (197 lb)  01/10/20 : 88 kg (194 lb)  12/17/19 : 89.4 kg (197 lb)  12/10/19 : 89.4 kg (197 lb)  11/05/19 : 96.2 kg (212 lb)  10/07/19 : 96. 2

## 2020-10-07 NOTE — PROGRESS NOTES
[facility]  Progress Note     Sam Goodwin Patient Status:  Inpatient    1940 MRN [id number]   [facility] 5NW-A Attending Javi Roblero MD   [address] Day # 4 PCP Makayla Brink MD     Chief Complaint: cough/ sore throat --   --   --    TP 7.5 6.1*  --   --   --        Estimated Creatinine Clearance: 72.9 mL/min (based on SCr of 0.79 mg/dL). No results for input(s): PTP, INR in the last 168 hours.     Recent Labs   Lab 10/02/20  1445   TROP <0.045            Imaging: Ab Hayes require inpatient services that will reasonably be expected to span two midnight's based on the clinical documentation in H+P. Based on patients current state of illness, I anticipate that, after discharge, patient will require TBD.

## 2020-10-08 NOTE — PLAN OF CARE
Patient A&O x 4 this AM with forgetfullness. 3L NC during the day with AVAP at night. Previously noncompliant with AVAP, but was using it throughout shift today. Hoarse voice. Hx of Afib, currently NSR on tele. Incontinent of urine, briefed. QID accucheck.

## 2020-10-08 NOTE — PROGRESS NOTES
[facility]  Progress Note    Amie Perez Patient Status:  Inpatient    1940 MRN [id number]   [facility] 5NW-A Attending Neville Mccrary MD   [facility] Day # 5 PCP Iline Felty, MD     Subjective:  Amie Perez is a(n) 79 y 4.0   CL 90* 90*   CO2 42.0* 48.0*   BUN 24* 36*   CREATSERUM 0.79 0.95     No results for input(s): PTP, INR, PTT in the last 168 hours. Cultures: Blood cultures remain negative    Radiology:  No results found.   CTs reviewed with increasing left poster alkalosis     Hyperglycemia     Hypoxemia     Dehydration     Failure to thrive in adult     Acute hypercapnic respiratory failure (HCC)     BiPAP (biphasic positive airway pressure) dependence      Assessment:  · Failure to thrive with evidence of dehydra

## 2020-10-08 NOTE — PLAN OF CARE
Problem: Impaired Activities of Daily Living  Goal: Achieve highest/safest level of independence in self care  Description: Interventions:  - Assess ability and encourage patient to participate in ADLs to maximize function  - Promote sitting position [facility]

## 2020-10-08 NOTE — PROGRESS NOTES
[facility]  Progress Note     Fer Ibarra Patient Status:  Inpatient    1940 MRN [id number]   [facility] 5NW-A Attending Eugene Alicea MD   Hosp Day # 5 PCP Newton Slaughter MD     Chief Complaint: hypoxia     S: Patien 11* 17  --   --   --   --    ALT 34 24  --   --   --   --    BILT 0.4 0.5  --   --   --   --    TP 7.5 6.1*  --   --   --   --     < > = values in this interval not displayed. Estimated Creatinine Clearance: 60.6 mL/min (based on SCr of 0.95 mg/dL). TBD  Discharge is dependent on: clinical stability  At this point Mr. Bety Ricketts is expected to be discharge to: rehab     Plan of care discussed with patient, RN     Claudio Sharma MD

## 2020-10-08 NOTE — PLAN OF CARE
Pt up to chair during the day, generalized weakness. On AVAPS this morning, NC 2-3L with meals, maintaining O2 sats above 90%. He is very hoarse, frequent tremors, irritable at times. Poor appetite, encouraged oral intake and healthy shakes.  Wife updated w

## 2020-10-08 NOTE — PLAN OF CARE
Assumed care at [address] pt on bed, on O2 3L/HF, AVAP at night,  SOB even at rest,  Denies pain.   IV antibiotic, solumedrol,nebs scheduled,Torsemide PO  Labs in am    Problem: Impaired Functional Mobility  Goal: Achieve highest/safest level of mobility/g perform bladder scan as needed  - Follow urinary retention protocol/standard of care  - Consider collaborating with pharmacy to review patient's medication profile  - Implement strategies to promote bladder emptying  Outcome: Progressing     Problem: METAB preferred)  - Offer food and liquids at a slow rate  - No straws  - Encourage small bites of food and small sips of liquid  - Offer pills one at a time, crush or deliver with applesauce as needed  - Discontinue feeding and notify MD (or speech pathologist)

## 2020-10-08 NOTE — OCCUPATIONAL THERAPY NOTE
OCCUPATIONAL THERAPY EVALUATION - INPATIENT     Room Number: 525/525-A  Evaluation Date: 10/8/2020  Type of Evaluation: Initial  Presenting Problem: COPD    Physician Order: IP Consult to Occupational Therapy  Reason for Therapy: ADL/IADL Dysfunction and D Pain in joints    • Pericarditis    • Peripheral vascular disease (HCC)    • Presence of other cardiac implants and grafts    • Renal disorder     hx of nephrolithiasis (hx of multiple UTI's)   • S/P pericardial surgery     On 2/13/2018: re-do sternotomy a retired  Hand Dominance: Right  Drives: No  Patient Regularly Uses: Glasses; Home O2    Prior Level of Function: week prior to admit primarily in bed, prior ambulates with rw 25 in home without assist, reports was ind with ADLs wife can assist if needed 35.96  CMS Modifier (G-Code): CK    FUNCTIONAL TRANSFER ASSESSMENT  Supine to Sit : Minimum assistance  Sit to Stand: Minimum assistance    Skilled Therapy Provided: PPE worn: surgical mask, gloves.  Pt was received supine in bed for session, pt t/f to EOB detailed assessments, several treatment options    Overall Complexity MODERATE     OT Discharge Recommendations: Sub-acute rehabilitation(ELOS 12-14 days)       PLAN  OT Treatment Plan: Balance activities; Energy conservation/work simplification techniques;

## 2020-10-08 NOTE — CM/SW NOTE
Care Progression Note:  Active Acute Medical Issue: Hypoxemia, PNA, acute on chronic hypoxic/hypercapnic resp failure  Other Contributing Medical Factors/Dx. : failure to thrive, PAF, CAD, HTN, COPD/home o2 and CPAP, PVD, CHF  Length of stay: 5  Avoidable D

## 2020-10-09 NOTE — PLAN OF CARE
Problem: Impaired Swallowing  Goal: Minimize aspiration risk  Description: Interventions:  - Patient should be alert and upright for all feedings (90 degrees preferred)  - Offer food and liquids at a slow rate  - No straws  - Encourage small bites of rocael

## 2020-10-09 NOTE — SLP NOTE
ADULT SWALLOWING EVALUATION    ASSESSMENT    ASSESSMENT/OVERALL IMPRESSION:  Patient seen for swallowing evaluation due to concern for aspiration. Patient admitted due to SOB and weakness. Suspected LLL pneumonia on admission.  He has a history of COPD, P to be fluctuating and a VFSS may not be a true picture of his swallow function. Discussed in detail with wife following evaluation. Will continue to follow.          RECOMMENDATIONS   Diet Recommendations - Solids: Regular  Diet Recommendations - Liquid: Heart murmur    • Heart palpitations    • High blood pressure    • High cholesterol    • Hyperlipidemia LDL goal <70    • Hypertension, essential, benign    • Leaking of urine    • Loss of appetite    • Microalbuminuria due to type 2 diabetes mellitus ([facility])  Respiratory Status: Unlabored  Consistencies Trialed:  Thin liquids;Puree;Hard solid  Method of Presentation: Staff/Clinician assistance;Spoon;Straw;Single sips  Patient Positioning: Upright;Midline(in bed)    Oral Phase of Swallow: Impaired  Bolus

## 2020-10-09 NOTE — PLAN OF CARE
A&O x 4, forgetful at times. 2L O2 via NC. AVAP when sleeping. Accucheck QID. IV Zosyn. Aspiration precaution. Plan for Chest XR and Video swallow. Safety precautions in place. Will continue to monitor.     Problem: Impaired Functional Mobility  Goal: Achie

## 2020-10-09 NOTE — PROGRESS NOTES
[facility]  Progress Note    Zora Springer Patient Status:  Inpatient    1940 MRN [id number]   [facility] 5NW-A Attending Ascencion Cui MD   [facility] Day # 6 PCP Bethanie Pierson MD     Subjective:  Zora Springer is a(n) 79 y 10/07/20  0641 10/09/20  0759   RBC 3.57* 2.78* 3.39* 3.38*   HGB 9.4* 7.5* 9.0* 9.2*   HCT 32.8* 25.5* 30.5* 30.1*   MCV 91.9 91.7 90.0 89.1   MCH 26.3 27.0 26.5 27.2   MCHC 28.7* 29.4* 29.5* 30.6*   RDW 13.7 13.6 14.1 14.3   NEPRELIM 10.47* 7.92*  --  12

## 2020-10-09 NOTE — CM/SW NOTE
Gave accepting [facility]uan list to patient/wife. Patient seems to understand and agreeable to xuan stay. Wife is hesitant, states her and her children \"dont want him in a nursing home\" and scared of eric [address].  They will review list, states considering t

## 2020-10-09 NOTE — PLAN OF CARE
Assumed care at [address] pt on bed, on O2 3l/NC,O2 sats >93%, decrease O2 to 2l/NCAVAP per RT  Family members at the bedside brought food,Per pt, he was able to eat good. Still with SOB  Pt more alert and conversant.   Denies pain,  IV antibiotic, solume Absence of urinary retention  Description: INTERVENTIONS:  - Assess patient’s ability to void and empty bladder  - Monitor intake/output and perform bladder scan as needed  - Follow urinary retention protocol/standard of care  - Consider collaborating with Impaired Activities of Daily Living  Goal: Achieve highest/safest level of independence in self care  Description: Interventions:  - Assess ability and encourage patient to participate in ADLs to maximize function  - Promote sitting position while performi

## 2020-10-09 NOTE — DIETARY MALNUTRITION NOTE
[facility]    NUTRITION ASSESSMENT    Pt meets severe malnutrition criteria.     CRITERIA FOR MALNUTRITION DIAGNOSIS:  Criteria for severe malnutrition diagnosis: chronic illness related to wt loss greater than 5% in 1 month, energy intake less than 75 feeling well upon visit. Agreeable to ONS, RD added. Noted pt with 5% wt loss x 1 month and global wasting identified. RD to follow po, ons, wts. ANTHROPOMETRICS:  Ht:  185.4 cm (6' 1\")  Wt: 68 kg (150 lb).  This is 81 % of IBW  BMI: Body mass index is

## 2020-10-09 NOTE — PROGRESS NOTES
10/09/20 1305   BiPAP   $ RT Standby Charge (per 15 min) 1   $ SEBLE set up charge Yes   Device Trilogy   BiPAP / CPAP CE# 1   Mode AVAPS   Interface Full face mask   Mask Size Medium   Control Settings   Set Rate 18 breaths/min   Oxygen Percent 30 %   In

## 2020-10-09 NOTE — PROGRESS NOTES
[facility]  Progress Note     Javed Peterson Patient Status:  Inpatient    1940 MRN [id number]   [facility] 5NW-A Attending Veronica Gonsalves MD   [facility] Day # 6 PCP Yessica Costa MD     Chief Complaint: eating breakfast    S --   --   --    ALT 34 24  --   --   --   --    BILT 0.4 0.5  --   --   --   --    TP 7.5 6.1*  --   --   --   --     < > = values in this interval not displayed. Estimated Creatinine Clearance: 54.9 mL/min (based on SCr of 1.05 mg/dL).     No results on: clinical stability  At this point Mr. Anusha Stevens is expected to be discharge to: rehab     Plan of care discussed with patient, RN, daughter.  Chante Alfaro MD

## 2020-10-10 NOTE — PLAN OF CARE
Pt AOx3. AVAPS overnight. VSS. NSR on tele. Wife at bedside. Pt resting comfortably, tremors intermittently overnight. Plan for IV antibiotics and steroids. Plan for video swallow study. Will continue to monitor. [id number]:  Attempted to give pt AM meds, p Problem: GENITOURINARY - ADULT  Goal: Absence of urinary retention  Description: INTERVENTIONS:  - Assess patient’s ability to void and empty bladder  - Monitor intake/output and perform bladder scan as needed  - Follow urinary retention protocol/standar noted  Outcome: Progressing     Problem: Impaired Activities of Daily Living  Goal: Achieve highest/safest level of independence in self care  Description: Interventions:  - Assess ability and encourage patient to participate in ADLs to maximize function

## 2020-10-10 NOTE — PLAN OF CARE
10/10/2020    Pt converted to Afib RVR @ 1700 's  Asymptomatic  Notified hospitalist    Pt started on Cardizem gtt @ 5mg/hr    Given one time dose Cardizem IV push 10mg- See MAR    Pt resting in bed with family at bedside  Will cont to monitor    Not

## 2020-10-10 NOTE — PROGRESS NOTES
[facility]  Progress Note    Melba Herrera Patient Status:  Inpatient    1940 MRN [id number]   [facility] 5NW-A Attending Billie Nguyen MD   [facility] Day # 7 PCP Matilda Horvath MD     Subjective:  Melba Herrera is a(n) 79 y RBC 3.39* 3.38*   HGB 9.0* 9.2*   HCT 30.5* 30.1*   MCV 90.0 89.1   MCH 26.5 27.2   MCHC 29.5* 30.6*   RDW 14.1 14.3   NEPRELIM  --  12.63*   WBC 12.0* 13.5*   .0 416.0     Recent Labs   Lab 10/07/20  0641 10/08/20  0626 10/09/20  0759   * respiratory status closely  · Wean o2 for sats >89%  · Check ABG in am  · Continue nocturnal AVAPS with plan to continue at home  · Wean steroids   · Mobilize as able  · Discharge planning    Twila Read MD  SLA

## 2020-10-10 NOTE — PROGRESS NOTES
[facility]  Progress Note     Lorne Kulkarni Patient Status:  Inpatient    1940 MRN [id number]   [facility] 5NW-A Attending Keshia Nunez MD   [facility] Day # 7 PCP Holden Abbott MD     Chief Complaint: hand tremors   S: Ashok Quick Imaging: Imaging data reviewed in Epic.     Medications:   • predniSONE  60 mg Oral Daily with breakfast   • melatonin  3 mg Oral Nightly   • insulin detemir  15 Units Subcutaneous Daily   • Insulin Aspart Pen  2-10 Units Subcutaneous TID CC and HS   •

## 2020-10-10 NOTE — VIDEO SWALLOW STUDY NOTE
ADULT VIDEOFLUOROSCOPIC SWALLOWING STUDY    Admission Date: 10/2/2020  Evaluation Date: 10/10/2020  Radiologist: Dr. Taco Fisher   Diet Recommendations - Solids: Regular  Diet Recommendations - Liquid: Honey thick     * Pt silently aspirat obstructive pulmonary disease) (HCC)     O2 5L continuous   • Coronary atherosclerosis of unspecified type of vessel, native or graft    • Diabetes ([address]    • Diabetes mellitus ([address]    • Difficulty breathing    • Disorder of thyroid    • Easy bruising chair. Patient Viewed: Lateral.  Patient Alertness: Fully alert. Consistencies Presented: Thin liquids; Nectar thick liquids; Honey thick liquids;Puree;Hard solid to assess oropharyngeal swallow function and assess for compensatory strategies to improve sa Honey Thick Liquids): Impaired  Bolus Retrieval (VFSS - Honey Thick Liquids): Intact  Bilabial Seal (VFSS - Honey Thick Liquids): Intact  Bolus Formation (VFSS - Honey Thick Liquids): Impaired  Bolus Propulsion (VFSS - Honey Thick Liquids):  Impaired  Masti delayed swallow trigger, incomplete epiglottic inversion/laryngeal vestibule closure, francine/silent aspiration of thin/nectar thick liquids, and mild pharyngeal residue with all consistencies.      More specifically, francine/silent aspiration occurred before t recommendations with patient/family/caregiver. Agreement/Understanding verbalized and all questions answered to their apparent satisfaction. INTERDISCIPLINARY COMMUNICATION  Reviewed results with Radiologist; agreement verbalized.     Patient Katy Delong

## 2020-10-10 NOTE — SLP NOTE
SPEECH DAILY NOTE - INPATIENT    ASSESSMENT & PLAN   ASSESSMENT  Pt seen this morning for bedside swallow re-evaluation. Nurse approved session and stated if pt was awake upon SLP arrival, pt can be taken off AVAPS. Pt's wife was present.     Pt was easily thin (by tsp) liquids without overt signs or symptoms of aspiration with 100 % accuracy over 2-3 session(s).   In Progress   Goal #2 The patient/family/caregiver will demonstrate understanding and implementation of aspiration precautions and swallow strateg

## 2020-10-11 NOTE — PROGRESS NOTES
[facility]  Progress Note     Amie Amyshukri Patient Status:  Inpatient    1940 MRN [id number]   [facility] 7NE-A Attending Neville Mccrary MD   [facility] Day # 8 PCP Iline Felty, MD     Chief Complaint: \" I want real water\ Oral Daily with breakfast   • insulin detemir  10 Units Subcutaneous [email or web address]   • Insulin Aspart Pen  1-10 Units Subcutaneous TID CC   • Insulin Aspart Pen  1-10 Units Subcutaneous TID CC and HS   • melatonin  3 mg Oral Nightly   • ipratropium-albuter

## 2020-10-11 NOTE — PLAN OF CARE
NURSING TRANSFER NOTE      Patient trasnferred via Cart  Oriented to room. Safety precautions initiated. Bed in low position. Call light in reach.     Report received from Aime Blackburn, [facility] gtt at 10, per RN cardiology to see in am  Wife at bedsid

## 2020-10-11 NOTE — PLAN OF CARE
Assumed pt care at 2100  Pt A/O x4, drowsy. Bilateral tremors  2L NC, AVAPS NOC  A. Fib RVR, cardizem gtt at 10/hr.  Pt. asymptomatic  Converted to NSR at 0150, cardizem decreased down to dosed 5/hr  Incontinent; briefed & condom cath in place  Mapilex on of urinary retention  Description: INTERVENTIONS:  - Assess patient’s ability to void and empty bladder  - Monitor intake/output and perform bladder scan as needed  - Follow urinary retention protocol/standard of care  - Consider collaborating with pharmac Swallowing  Goal: Minimize aspiration risk  Description: Interventions:  - Patient should be alert and upright for all feedings (90 degrees preferred)  - Offer food and liquids at a slow rate  - No straws  - Encourage small bites of food and small sips of

## 2020-10-11 NOTE — PROGRESS NOTES
Called report to [facility] for tranfer to rm [id number]. Awaiting for room to be cleaned.   2100 called cardiac consult, transferred pt to rm [id number]

## 2020-10-11 NOTE — CONSULTS
[facility]  Report of Consultation    Toño [place] Patient Status:  Inpatient    1940 MRN [id number]   [facility] 7NE-A Attending Gavino Babb MD   [facility] Day # 8 PCP Walker Luu MD     Re multiple UTI's)   • S/P pericardial surgery     On 2/13/2018: re-do sternotomy and pericardectomy   • Shortness of breath    • Sleep apnea     was wearing CPAP at [facility] - hasn't been using it since May 2017   • Sleep disturbance    • Stress    • Thyroid dise reports that he does not use drugs.     Allergies:    Radiology Contrast *    Tightness in Throat, ANAPHYLAXIS    Comment:Pt states he stopped breathing  Iodine I 131 Tositu*    OTHER (SEE COMMENTS)    Comment:Intravenous    Medications:    Current Facility Intravenous, Q6H PRN  •  Metoclopramide HCl (REGLAN) injection 10 mg, 10 mg, Intravenous, Q8H PRN  •  acetaminophen (TYLENOL) tab 650 mg, 650 mg, Oral, Q6H PRN  •  glucose (DEX4) oral liquid 15 g, 15 g, Oral, Q15 Min PRN **OR** Glucose-Vitamin C (DEX-4) ch 10/09/2020 1.05   10/08/2020 0.95   10/07/2020 0.79   06/10/2020 1.06   02/20/2020 1.08   01/31/2020 1.07     Lab Results   Component Value Date    INR 1.09 09/13/2020    INR 1.05 04/05/2018    INR 1.24 (H) 02/14/2018         Julius Benedict  10/11/2020

## 2020-10-11 NOTE — PHYSICAL THERAPY NOTE
PHYSICAL THERAPY TREATMENT NOTE - INPATIENT    Room Number: 2951/5479-Q     Session:   Number of Visits to Meet Established Goals: 5    Presenting Problem: weakness, PNA    Problem List  Principal Problem:    Hypoxemia  Active Problems:    Pneumonia, bact - hasn't been using it since May 2017   • Sleep disturbance    • Stress    • Thyroid disease    • Type 2 diabetes mellitus (HCC)     Dx in 2005   • Uncomfortable fullness after meals    • Unspecified intestinal obstruction    • Visual impairment     sandra WALK                    AM-PAC '6-Clicks' INPATIENT SHORT FORM - BASIC MOBILITY  How much difficulty does the patient currently have. ..  -   Turning over in bed (including adjusting bedclothes, sheets and blankets)?: A Little   -   Sitting down on and jayson at EOB     Upper Extremity      Position Sitting and Supine     Repetitions   15 reps all supine LE exs except SLR 10 reps; LAQ 10 reps   Sets        Patient End of Session: In bed;Needs met;Call light within reach;RN aware of session/findings; All patient

## 2020-10-11 NOTE — PLAN OF CARE
Assumed pt care at 0730  VSS  Pt denies pain  Per pulm, maintain oxygen 90-92, patient on 1.5L NC  Aspiration precautions maintained, honey thick  Pills given whole in applesauce  Sacrum mepilex changed  R.  Arm mepilex changed  POC discussed with pt and fa retention  Description: INTERVENTIONS:  - Assess patient’s ability to void and empty bladder  - Monitor intake/output and perform bladder scan as needed  - Follow urinary retention protocol/standard of care  - Consider collaborating with pharmacy to review Minimize aspiration risk  Description: Interventions:  - Patient should be alert and upright for all feedings (90 degrees preferred)  - Offer food and liquids at a slow rate  - No straws  - Encourage small bites of food and small sips of liquid  - Offer pi

## 2020-10-11 NOTE — PLAN OF CARE
10/10/2020    Per Hospitalist- Titrated Cardizem gtt to 10mg/hr  Pt being transferred to cardiac tele unit  Cardiology on consult- needs to be notified to see tm

## 2020-10-11 NOTE — SLP NOTE
SPEECH DAILY NOTE - INPATIENT    ASSESSMENT & PLAN   ASSESSMENT  Pt seen for dysphagia tx to assess tolerance with recommended diet, ensure proper utilization of aspiration precautions and provide pt/family education.   Patient alert and up in bed with daug Plan/Recommendations: Dysphagia therapy    Interdisciplinary Communication: Discussed with RN            GOALS  Goal #1 The patient will tolerate regular consistency and honey thick liquids without overt signs or symptoms of aspiration with 100 % accuracy

## 2020-10-11 NOTE — PROGRESS NOTES
[facility]  Progress Note    Lilian Hernandez Patient Status:  Inpatient    1940 MRN [id number]   [facility] 5NW-A Attending Fidel Acuna MD   [facility] Day # 8 PCP Ubaldo Lino MD     Subjective:  Lilian Hernandez is a(n) 79 y interactive, no focal deficits    Lab Data Review:  Recent Labs   Lab 10/07/20  0641 10/09/20  0759   RBC 3.39* 3.38*   HGB 9.0* 9.2*   HCT 30.5* 30.1*   MCV 90.0 89.1   MCH 26.5 27.2   MCHC 29.5* 30.6*   RDW 14.1 14.3   NEPRELIM  --  12.63*   WBC 12.0* 13 post perivalvular closure 2017-history of postop A. fib     Plan:  · Completed abx for pneumonia  · Follow respiratory status closely  · Wean o2 for sats >89%  · ABG compensated with AVAPS nocturnally.  Continue nocturnal AVAPS with plan to continue at home

## 2020-10-12 NOTE — PHYSICAL THERAPY NOTE
PHYSICAL THERAPY TREATMENT NOTE - INPATIENT    Room Number: 0643/3734-B     Session: 3  Number of Visits to Meet Established Goals: 5    Presenting Problem: weakness, PNA    Problem List  Principal Problem:    Hypoxemia  Active Problems:    Pneumonia, ramana - hasn't been using it since May 2017   • Sleep disturbance    • Stress    • Thyroid disease    • Type 2 diabetes mellitus (HCC)     Dx in 2005   • Uncomfortable fullness after meals    • Unspecified intestinal obstruction    • Visual impairment     sandra AM-PAC '6-Clicks' INPATIENT SHORT FORM - BASIC MOBILITY  How much difficulty does the patient currently have. ..  -   Turning over in bed (including adjusting bedclothes, sheets and blankets)?: A Little   -   Sitting down on and standing up from a pete reps; LAQ 10 reps   Sets        Patient End of Session: Up in chair;Needs met;Call light within reach;RN aware of session/findings; All patient questions and concerns addressed; Alarm set; Family present    ASSESSMENT   Noted to have B UE/LE ataxia as well as

## 2020-10-12 NOTE — PROGRESS NOTES
[facility]    Electrophysiology Follow Up Note      Toño [place] Patient Status:  Inpatient    1940 MRN [id number]   [facility] 7NE-A Attending Gavino Babb MD   [facility] Day # 9 PCP Vergie Keeler, MD Shona Boas gabapentin (NEURONTIN) cap 1,200 mg, 1,200 mg, Oral, BID  •  Umeclidinium Bromide (INCRUSE ELLIPTA) 62.5 MCG/INH inhaler 1 puff, 1 puff, Inhalation, Daily  •  ipratropium-albuterol (DUONEB) nebulizer solution 3 mL, 3 mL, Nebulization, Q6H PRN  •  Levothyro * 134* 133*   K 4.2 4.0 4.3   CL 90* 90* 95*   CO2 42.0* 48.0* 36.0*       Recent Labs   Lab 10/07/20  0641 10/09/20  0759   RBC 3.39* 3.38*   HGB 9.0* 9.2*   HCT 30.5* 30.1*   MCV 90.0 89.1   MCH 26.5 27.2   MCHC 29.5* 30.6*   RDW 14.1 14.3   NEPR Type 2 diabetes mellitus (HCC)     Situational depression     Status post pericardial operation     BPH without obstruction/lower urinary tract symptoms     Nasal congestion     Nasal septal deviation     Hypothyroidism due to medication     Aneurysm, abdo

## 2020-10-12 NOTE — PROGRESS NOTES
[facility]  Progress Note     Jonas Orourke Patient Status:  Inpatient    1940 MRN [id number]   [facility] 7NE-A Attending Dhruv Love MD   [facility] Day # 5 PCP Kayla Brandt MD     Chief Complaint: AFIB    S: Patient de Subcutaneous [email or web address]   • Insulin Aspart Pen  1-10 Units Subcutaneous TID CC   • Insulin Aspart Pen  1-10 Units Subcutaneous TID CC and HS   • melatonin  3 mg Oral Nightly   • ipratropium-albuterol  3 mL Nebulization Q6H [facility]   • mycophenolate mofetil

## 2020-10-12 NOTE — PROGRESS NOTES
[facility]  Progress Note    Chang Torres Patient Status:  Inpatient    1940 MRN [id number]   [facility] 5NW-A Attending Susy Panchal MD   [facility] Day # 9 PCP Camilo Madrid MD     Subjective:  Chang Torres is a(n) 79 y 3.38*   HGB 9.0* 9.2*   HCT 30.5* 30.1*   MCV 90.0 89.1   MCH 26.5 27.2   MCHC 29.5* 30.6*   RDW 14.1 14.3   NEPRELIM  --  12.63*   WBC 12.0* 13.5*   .0 416.0     Recent Labs   Lab 10/07/20  0641 10/08/20  0626 10/09/20  0759   * 278* 269* 2017-history of postop A. fib     Plan:  · Completed abx for pneumonia  · Follow respiratory status closely  · Wean o2 for sats >89%  · ABG compensated with AVAPS nocturnally.  Continue nocturnal AVAPS with plan to continue at home  · Wean steroids   · Mobi

## 2020-10-12 NOTE — CM/SW NOTE
Spoke with pt's dtr Ranjana about family's decision about [facility] vs [facility]. She said that they have not yet decided what they want to.   They have the accepting [facility] list.  Gave them a private duty caregiver list.  She will discuss the options with her mother and si

## 2020-10-12 NOTE — PLAN OF CARE
Vital signs stable. Patient drowsy but easily arousable. Meds given with applesauce. Patient on thickened liquids and chopped diet per previous RN.   Patient working with PT.  ORLY discussing physical therapy recommendations with family to determine discha

## 2020-10-12 NOTE — PLAN OF CARE
Assumed pt care at 2100  Pt A/O x4  Bilateral tremors  1.5L NC, AVAPS NOC  NSR on tele with PVC's  Incontinent; briefed & condom cath in place  Mepilex on sacrum  Mepilex on right hand  No c/o pain  Call light within reach, will continue to monitor    Prob INTERVENTIONS:  - Assess patient’s ability to void and empty bladder  - Monitor intake/output and perform bladder scan as needed  - Follow urinary retention protocol/standard of care  - Consider collaborating with pharmacy to review patient's medication pr risk  Description: Interventions:  - Patient should be alert and upright for all feedings (90 degrees preferred)  - Offer food and liquids at a slow rate  - No straws  - Encourage small bites of food and small sips of liquid  - Offer pills one at a time, c

## 2020-10-13 NOTE — PROGRESS NOTES
Patient arrived at CCU with floor RN. Patient was on [address] Placed back on BiPaP right away. RN at bedside. Awaiting MD and repeat ABG order. Will continue to monitor.       10/13/20 0625   BiPAP   $ RT Standby Charge (per 15 min) 2

## 2020-10-13 NOTE — PROGRESS NOTES
[facility]  Cardiology Progress Note    Karyna Chase Patient Status:  Inpatient    1940 MRN [id number]   [facility] 6NE-A Attending Dalia Hutchinson MD   Hosp Day # 10 PCP Demario Mclaughlin MD       Subjective:   Intubated overn left perihilar consolidations is noted. No pneumothorax. Median sternotomy wires and sequelae of valve replacement is noted.       Medications:    • diltiazem 100mg/100ml in NaCl       • Chlorhexidine Gluconate  15 mL [email or web address]   • pantop given persistent hypotension. Will start heparin gtt per Afib protocol as well. hgb 10.9, plt 640  · D/w Nicko Espinoza, hospitalist PA-d/w family and likely will make patient DNR today.        Discussed plan of care with patient and nursing, and Dr. Tripp Egan who w

## 2020-10-13 NOTE — PROGRESS NOTES
Labored breathing/ tachycardic. ABG-ph-7.24/ Pco2-83/Hco3-34.4 . Pt has been noted to be lethargic. Pulmonology was informed and ordered to transfer to ICU.

## 2020-10-13 NOTE — PLAN OF CARE
Received patient on NRBM, RNs state that RT is coming with the AVAPs machine shortly. Pt in afib with RVR, on cardizem gtt at 20. Updated Dr Berry Caldwell, and orders received. Again updated Dr Berry Caldwell at [address] re bp dropping.  Orders received to give bolus

## 2020-10-13 NOTE — PROGRESS NOTES
[facility]  Progress Note     Estrada Grande Patient Status:  Inpatient    1940 MRN [id number]   [facility] 7NE-A Attending Juana Mdoi MD   Hosp Day # 8 PCP Crissy Hopkins MD     Chief Complaint: intubated    S: [email or web address]   • pantoprazole (PROTONIX) IV push  40 mg Intravenous QAM AC   • meropenem  500 mg Intravenous Q12H   • ipratropium-albuterol  3 mL Nebulization Q4H YENNI   • hydrocortisone Na succinate PF  100 mg Intravenous Q8H [facility]   • sodium chloride 0.9% examined this am, agree with above.      Pt was overall stable through the day yesterday until in the evening when he became more restless per family and per staff overnight developed more agitation, respiratory distress, transferred to ICU and placed on ca

## 2020-10-13 NOTE — DIETARY NOTE
[facility]    NUTRITION ASSESSMENT    Pt meets severe malnutrition criteria.     CRITERIA FOR MALNUTRITION DIAGNOSIS:  Criteria for severe malnutrition diagnosis: chronic illness related to wt loss greater than 5% in 1 month, energy intake less than 75 chewing. Flavor preferences reported for ONS and encouraged usage. +BM. Lunch ordered with patient.      10/4-Per H&P, Orantes Bakari is a 79year old male with extensive medical history who was sent to the ED from wound care with complaints of shortness to meet 100% nutritional needs     MEDICATIONS:  Insulin, Abx, Protonix, BiCarb, IVF, Propofol @ 4.6ml/hr, Levo, Phenylephrine,     LABS:  Glu 205; BUN 78; Cr 2.67; Elevated AST/ALT;      Pt is at high nutrition risk    FOLLOW-UP DATE:  10/16    Tiffanie Raphael

## 2020-10-13 NOTE — ANESTHESIA PROCEDURE NOTES
Airway  Date/Time: 10/13/2020 7:50 AM  Urgency: elective      General Information and Staff    Patient location during procedure: ICU  Anesthesiologist: Amol Forrester MD  Performed: anesthesiologist     Indications and Patient Condition  Indications

## 2020-10-13 NOTE — CM/SW NOTE
Care Progression Note:  Active Acute Medical Issue:   Acute respiratory failure with hypoxia and hypercapnia ([facility] [address]- re intubated this am. Family deciding on code status. On levo, vasopressin, neosynephrine, amiodarone, and heparin gtts.     Other Contributi

## 2020-10-13 NOTE — PROGRESS NOTES
Patient on bipap. abg done on nasal cannula 10 lpm. Results calling in to MD. RN aware. Will continue to monitor.       10/13/20 0444   BiPAP   $ RT Standby Charge (per 15 min) 2   Device Trilogy   BiPAP / CPAP CE# 1   Mode AVAPS   Interface Full face mask

## 2020-10-13 NOTE — PLAN OF CARE
Spoke with spouse/dtr in the waiting room - they report he would not want CPR/shock. She is awaiting further family arrival before making final decision together as a family.   Updated cards/pulm/MD/RN    Amanda WEI  11:10 AM    Addendum:  Leydi Munguia

## 2020-10-13 NOTE — PROGRESS NOTES
[facility]  Progress Note    [facility] Patient Status:  Inpatient    1940 MRN [id number]   [facility] 6NE-A Attending Sonia Fox MD   Hosp Day # 10 PCP Shania Turcios MD     STATUS UPDATE: Over the course of the nig midline, no adenopathy, no thyromegaly. Lungs: Decreased breath sounds bilaterally, tachypnea   Chest wall: No tenderness or deformity.    Heart: Irregularly irregular/atrial fibrillation, rate improved albeit somewhat tachypneic tones are distant, normal will require intubation and mechanical ventilation  · Wean o2 for sats >89%  · Follow closely in the intensive care unit  · Wean steroids   · Mobilize as able  · Consider palliative/comfort care    Discussion: I called the patient's wife at home and inform

## 2020-10-13 NOTE — SLP NOTE
Note patient orally intubated and not appropriate for follow up at this time. Will hold and follow peripherally, resuming services when clinically appropriate.     Opal Ornelas Andrzej 88 CCC-SLP  Pager [phone]

## 2020-10-13 NOTE — PROGRESS NOTES
Pt arrested. Family decided on DNR and informed RN they did not want heroics.   Pt  at 3:55pm.    Leia WEI  3:59 PM

## 2020-10-13 NOTE — PROGRESS NOTES
Generalized shaking/tremors. Pt with  Non comprehensible  Speech but able to indicate needs. Pt on AVAP with episode of not being able to tolerate it well. Pt has indicated to be off for awhile and RT is aware.  Pt placed back on Hi flow NC @10LPM and satti

## 2020-10-13 NOTE — PROGRESS NOTES
Pt on afib with RVR with HR sustaining on the 140's- 150's . Cardiology was informed and pt placed back on cardizem gtt. Per protocol.

## 2020-10-13 NOTE — RESPIRATORY THERAPY NOTE
Arterial line placed per MD. placed  Left radial, threaded with no resistance  Good wave form and drawback from line.

## 2020-10-14 NOTE — PROGRESS NOTES
10/13/20 2039   Clinical Encounter Type   Visited With Patient not available;Family   Routine Visit Introduction   Crisis Visit Death   Patient's Supportive Strategies/Resources Called family by phone since could not see them when they came to be with t

## 2020-10-14 NOTE — PLAN OF CARE
Assumed care this morning at shift change. Pt on avaps, mostly unresponsive. Opens eyes to painful stimuli. Orders to intubate pt, done around 0730. BP dropping, orders for fluid bolus then levophed started.   BP continues to drop, needing levophed to c

## 2020-10-14 NOTE — DISCHARGE SUMMARY
[facility] [place] HOSPITALIST  DISCHARGE SUMMARY     Nguyen Courser Patient Status:  Inpatient    1940 MRN [id number]   [facility] 6NE-A Attending Ayaz Mathur MD   [facility] Day # 10 PCP Gabi Rice MD     Date of Admission: 10/2/2020  Da respiratory distress/failure, presumed septic shock due to aspiration PNA, atrial fibrillation with RVR. He was transferred to ICU and intubated per family's request after discussing [address].    He was max on medical therapy (pressor support, vent support, hepa

## 2020-10-19 NOTE — PROCEDURES
ELECTROENCEPHALOGRAM REPORT      Patient Name: Miranda Clayton   : 1940   Requesting Physician: Dr. Mercedes Tucker   Date of Test: 10/13/20  History: 79year old male with history of atrial fibrillation, COPD, and chronic respiratory failure who was a

## 2020-12-08 ENCOUNTER — APPOINTMENT (OUTPATIENT)
Dept: CARDIOLOGY | Age: 80
End: 2020-12-08
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
## 2021-07-22 NOTE — PLAN OF CARE
PRESERVATIVE FREE ARTIFICIAL TEARS:  I recommended frequent high quality preservative free artificial tears in both eyes. Problem: RESPIRATORY - ADULT  Goal: Achieves optimal ventilation and oxygenation  INTERVENTIONS:  - Assess for changes in respiratory status  - Assess for changes in mentation and behavior  - Position to facilitate oxygenation and minimize respiratory effo

## 2021-10-25 NOTE — TELEPHONE ENCOUNTER
Pt called. He needs a refill on his blood sugar testing equipment and strips. I cannot find any of these in the medication tab.   When refilling please send to [address], [address], [phone]
S/w pt on this. Reports he has contour next meter but it is not working in some time. Asking for new meter, strips, lancets. Tests once a day. Reports he uses dav contour. I have sent for him.
25-Oct-2021 22:38

## 2021-10-27 NOTE — PROGRESS NOTES
Chief Complaint  This information was obtained from the patient  Patient is here for a wound care follow up. Patient denies any current pain.     Allergies  Iodinated Contrast- Oral and IV Dye (Reaction: Tightness in throat)    HPI  This information was o Information  Medication reconciliation completed at today's visit. : Yes        Objective    Constitutional  Height/Length: 73 in (185.42 cm), Weight: 212 lbs (96.36 kgs), BMI: 28, Temperature: 96.2 °F (35.67 °C), Pulse: 62 bpm, Respiratory Rate: 16 breath Weak.    Lower Extremity Assessment  Edema Assessment:  Left Extremity: Edema is present  Compression Device In Use: Yes  Device Used Correctly: Yes  Compression Device Used: Multi-Layer Wrap  Calf Measurement 34 cm from heel with left measurement of 29 cm #2. 6 sq cm of product was utilized and was secured with Steri-Strips. Post application, a dressing was applied: bordered foam. A time out was conducted prior to the start of the procedure.  The procedure was tolerated well with a pain level of 0 throughout boot  Compression to Right Leg. Compression to Left Leg  Avoid prolonged standing in one place. Elevate leg(s)as much as possible.     Off-Loading  Pressure relief shoe / inserts / foams    Misc/Additional Orders  Increase dietary protein  Decrease salt i no

## 2023-02-06 NOTE — PLAN OF CARE
Problem: RESPIRATORY - ADULT  Goal: Achieves optimal ventilation and oxygenation  INTERVENTIONS:  - Assess for changes in respiratory status  - Assess for changes in mentation and behavior  - Position to facilitate oxygenation and minimize respiratory effo Valtrex Counseling: I discussed with the patient the risks of valacyclovir including but not limited to kidney damage, nausea, vomiting and severe allergy.  The patient understands that if the infection seems to be worsening or is not improving, they are to call.

## 2024-10-14 NOTE — PROGRESS NOTES
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 Sylvie Botello  Next MD visit: none scheduled 116

## (undated) DEVICE — PDS II VLT 0 107CM AG ST3: Brand: ENDOLOOP

## (undated) DEVICE — SUTURE SILK 0 FSL

## (undated) DEVICE — CELL SAVER RESERVOIR BRAT

## (undated) DEVICE — ENDOSCOPY PACK - LOWER: Brand: MEDLINE INDUSTRIES, INC.

## (undated) DEVICE — OPEN HEART: Brand: MEDLINE INDUSTRIES, INC.

## (undated) DEVICE — SOL  .9 1000ML BTL

## (undated) DEVICE — TRAP 4 CPTR CHMBR N EZ INLN

## (undated) DEVICE — TRAY ENDOVEIN KTV16 HARVESTING

## (undated) DEVICE — FOUR-WAY STOPCOCK WITH SPIN-LOCK®: Brand: DISCOFIX®

## (undated) DEVICE — SOL  .9 500ML

## (undated) DEVICE — 1200CC GUARDIAN II: Brand: GUARDIAN

## (undated) DEVICE — TIBURON DRAPE TOWELS: Brand: CONVERTORS

## (undated) DEVICE — Device

## (undated) DEVICE — SOL LACT RINGERS 1000ML

## (undated) DEVICE — SNARE 9MM 230CM 2.4MM EXACTO

## (undated) DEVICE — GAUZE SPONGES,USP TYPE VII GAUZE, 12 PLY: Brand: CURITY

## (undated) DEVICE — HEMOCLIP HORIZON LG 004200

## (undated) DEVICE — PAD ELECTRODE MULTIRADIO ADLT

## (undated) DEVICE — SYRINGE 30ML LL TIP

## (undated) DEVICE — SUTURE SILK 0 KS

## (undated) DEVICE — SUTURE POLYDEK 3-0 69-738

## (undated) DEVICE — SUTURE PROLENE 4-0 SH

## (undated) DEVICE — Device: Brand: DEFENDO AIR/WATER/SUCTION AND BIOPSY VALVE

## (undated) DEVICE — SUTURE PROLENE 5-0 C-1

## (undated) DEVICE — 3M™ RED DOT™ MONITORING ELECTRODE WITH FOAM TAPE AND STICKY GEL, 50/BAG, 20/CASE, 72/PLT 2570: Brand: RED DOT™

## (undated) DEVICE — BARD® PTFE FELT PLEDGETS, (OVAL), 4.8 MM X 6 MM: Brand: BARD® PTFE FELT PLEDGETS

## (undated) DEVICE — STERILE POLYISOPRENE POWDER-FREE SURGICAL GLOVES: Brand: PROTEXIS

## (undated) DEVICE — SUTURE SILK 2-0

## (undated) DEVICE — TRANSPOSAL ULTRAFLEX DUO/QUAD ULTRA CART MANIFOLD

## (undated) DEVICE — FILTERLINE NASAL ADULT O2/CO2

## (undated) DEVICE — CELL SAVER BAG 600ML 4R2023

## (undated) DEVICE — FIXED CORE WIRE GUIDE SAFE-T-J, CURVED: Brand: COOK

## (undated) DEVICE — CELL SAVER 5/5+ BOWL KIT-225ML: Brand: HAEMONETICS CELL SAVER 5/5+ SYSTEMS

## (undated) NOTE — ED AVS SNAPSHOT
Chang Torres   MRN: [id number]    Department:  [facility] Emergency Department   Date of Visit:  8/4/2017           Disclosure     Insurance plans vary and the physician(s) referred by the ER may not be covered by your plan.  Please contact you If you have been prescribed any medication(s), please fill your prescription right away and begin taking the medication(s) as directed    If the emergency physician has read X-rays, these will be re-interpreted by a radiologist.  If there is a significant

## (undated) NOTE — LETTER
[facility] [place]ON[place]      In the course of your treatment, it may become necessary to administer a transfusion of blood or blood components.  This form provides basic information concerning this proc If loss of blood poses serious threats in the course of your treatment, THERE IS NO EFFECTIVE ALTERNATIVE TO BLOOD TRANSFUSION.  However, if you have any further questions on this matter, your physician will fully explain the alternatives to you if it has n

## (undated) NOTE — LETTER
[facility]     I agree to have a Peripherally Inserted Central Catheter (PICC) placed in my arm.    1. The PICC insertion procedure, care, maintenance, risks, benefits, and complications have been explained to me by my physic 7. The person performing this procedure has discussed the potential benefits, risks, and side effects of the PICC; the likelihood of achieving goals; and potential problems that might occur during recuperation.  They also discussed reasonable alternatives t

## (undated) NOTE — LETTER
[facility]  Jana Gooden [address] [facility], [address] [place] [address]    Consent for Operation    Date: __________________    Time: _______________    1. I authorize the performance upon Lamberto Villegas the following operation:      Re- do pericardiectomy     2.  I videotape. The \[facility] [place] [facility]\"" will not be responsible for storage or maintenance of this tape. 6. For the purpose of advancing medical education, I consent to the admittance of observers to the Operating Room.     7. I authorize the use of any specimen, organs Signature of Patient:   ___________________________    When the patient is a minor or mentally incompetent to give consent:  Signature of person authorized to consent for patient: ___________________________   Relationship to patient: _____________________ drugs/illegal medications). Failure to inform my anesthesiologist about these medicines may increase my risk of anesthetic complications. · If I am allergic to anything or have had a reaction to anesthesia before.     3. I understand how the anesthesia med I have discussed the procedure and information above with the patient (or patient’s representative) and answered their questions. The patient or their representative has agreed to have anesthesia services.     _______________________________________________

## (undated) NOTE — MR AVS SNAPSHOT
[facility], [address] [address] [phone]               Thank you for choosing us for your health care visit with Joann Valdovinos MD.  We are glad to serve you and happy to provide you with this ocampo Current Medications          This list is accurate as of: 3/30/17  4:48 PM.  Always use your most recent med list.                aspirin 81 MG Tbec   Take 1 tablet by mouth daily. Atorvastatin Calcium 40 MG Tabs   Take 40 mg by mouth nightly. Commonly known as:  DEMADEX           * Notice: This list has 2 medication(s) that are the same as other medications prescribed for you. Read the directions carefully, and ask your doctor or other care provider to review them with you.             Results including white bread, rice and pasta   Eat plenty of protein, keep the fat content low Sugars:  sodas and sports drinks, candies and desserts   Eat plenty of low-fat dairy products High fat meats and dairy   Choose whole grain products Foods high in sodiu

## (undated) NOTE — Clinical Note
Patient is coming for hospital follow up on 12/18/17. Pt stated he is doing better since d/c. Pt denies any SOB, CP or chest tightness. Pt is scheduled to see Dr. Margaux Willams but not until 2/28. Pt is not sure if he should see another Rheumatologist sooner.  Resid

## (undated) NOTE — MR AVS SNAPSHOT
[facility], [address] [phone]               Thank you for choosing us for your health care visit with Gabi Rice MD.  We are glad to serve you and happy to provide you with this ocampo • Overweight (BMI ³25 but <30)   • Family history of diabetes   • Age 72 years or older   • History of gestational diabetes or birth of baby weighing more than 9 pounds     Covered at least every 3 years,           GLUCOSE (mg/dL)   Date Value   02/23/2017 Glaucoma Screening      Ophthalmology Visit   Covered annually for Diabetics, people with Glaucoma family history,   Americans over age 48   Americans over age 72 Data entered on: 9/6/2016   Last Dilated Eye Exam 9/6/2016    OK to schedule i [email or web address] pdf  An information packet, including necessary form from the [address] website. http://www. [place]ph.state. [place]/public/books/advin.htm  A link to the [employer] Fluticasone Propionate 50 MCG/ACT Susp   USE TWO SPRAYS IN EACH NOSTRIL DAILY. What changed:  See the new instructions. Commonly known as:  FLONASE           gabapentin 300 MG Caps   Take 3 capsules (900 mg total) by mouth 2 (two) times daily.  Take - Atorvastatin Calcium 40 MG Tabs  - gabapentin 300 MG Caps  - glimepiride 2 MG Tabs  - Linagliptin 5 MG Tabs  - metoprolol Tartrate 25 MG Tabs  - ramipril 10 MG Caps  - torsemide 20 MG Tabs            MyChart     Visit MyChart  You can access your MyChar ? If you have pets, be careful that you don’t trip over them. OUTSIDE SAFETY TIPS  ? Always wear good shoes with proper support and traction. ? Always use hand rails on stairs and escalators. ? Cover porch steps with gritty weather proof paint.   ? Pay

## (undated) NOTE — IP AVS SNAPSHOT
[facility] [place] [address] [place], [address] ~ [phone]                Discharge Summary   6/16/2017    [address]  [facility]           Admission Information        Provider Department    6/16/2017 Rosa Isela Mishra MD  8ne-A CONTINUE taking these medications        Instructions Authorizing Provider    Morning Afternoon Evening As Needed    aspirin 81 MG Tbec   Last time this was given:  81 mg on 6/18/2017  9:25 AM   Next dose due:  6/19/17        Take 1 tablet by mouth daily. Generic drug:  Tiotropium Bromide Monohydrate   Next dose due:  6/19/17        Inhale 2.5 mcg into the lungs daily.                             SYMBICORT 160-4.5 MCG/ACT Aero   Generic drug:  Budesonide-Formoterol Fumarate   Next dose due:  6/18/17        I 13.3 (06/16/17)  42.0 (06/16/17)  86.6 -- -- -- (06/16/17)  216.2 --      Metabolic Lab Results  (Last result in the past 90 days)    HgbA1C Glucose BUN Creatinine Calcium Alkaline Phosph AST    -- (06/18/17)  138 (H) (06/18/17)  31 (H) (06/18/17)  1.22 (0 office, you can view your past visit information in [employer] by going to Visits < Visit Summaries. [employer] questions? Call [phone] for help. [employer] is NOT to be used for urgent needs.   For medical emergencies, dial 911.             _____________ Salicylates    aspirin 81 MG Oral Tab EC         Use:  “Thinning” of blood to prevent clotting within blood vessels, Pain relief   Most common side effects:  Bleeding, stomach upset   What to report to your healthcare team: Unusual bleeding, abdominal reuben mouth/tongue, shortness of breath, sensation of heart racing, rash, or itching           General Nerve Function Medications     gabapentin 300 MG Oral Cap       Use:  Treat conditions such as seizures, headaches, depression, anxiety and other neurologic co

## (undated) NOTE — IP AVS SNAPSHOT
[address]            (For Outpatient Use Only) Initial Admit Date: 2/13/2018   Inpt/Obs Admit Date: Inpt: 2/13/18 / Obs: N/A   Discharge Date:    Sarah Binet:  [de-identified]   MRN: [de-identified]   CSN: [id number]        ENCOUNTER  Patient Subscriber Name:  Waleska Julio :    Subscriber ID:  Pt Rel to Subscriber:    Hospital Account Financial Class: Medicare    2018

## (undated) NOTE — IP AVS SNAPSHOT
Patient Demographics     Address  [place] [address]  [place] [address] Phone  [phone] (Home) *Preferred* E-mail Address  [email or web address]      Emergency Contact(s)     Name Relation Home Work [address] [place] [address] 63 [phone] Cardiopulmonary Rehabilitation On 3/22/2018. Specialty:  Cardiac Rehabilitation  Why:  Time 2:30 pm  Contact information:  [address] [place] [address] [phone]  A Christin Kemp MD         PROAIR RESPICLICK 028 (17 Base) MCG/ACT Aepb  Generic drug:  Albuterol Sulfate      Inhale 2 puffs into the lungs every 6 (six) hours as needed (for wheezing or shortness of breath).           spironolactone 25 MG Tabs  Commonly k [id number] ipratropium-albuterol (DUONEB) nebulizer solution 3 mL 02/19/18 1647 Given      [id number] ipratropium-albuterol (DUONEB) nebulizer solution 3 mL 02/19/18 1943 Given      [id number] ipratropium-albuterol (DUONEB) nebulizer solution 3 mL 02/20/18 0 Glucose 127 70 - 99 mg/dL [facility]   BUN 37 8 - 20 mg/dL [facility]   Creatinine 1.24 0.70 - 1.30 mg/dL — [facility]   GFR, Non- 56 >=60 [facility]   GFR, -American 64 >=60 — [facility]   Comment:           Estimated GFR unit Order Status:  Completed Lab Status:  Final result Updated:  02/18/18 1648    Specimen:  Other from Heart      Anaerobic Culture No Anaerobes isolated    FUNGUS CULTURE(P) Once [[id number]] Collected:  02/13/18 0920    Order Status:  Completed Lab Status: Narrative: The following orders were created for panel order AFB CULTURE AND SMEAR.   Procedure                               Abnormality         Status                     ---------                               -----------         ------ generalized weakness from pericarditis status post surgical intervention  History of present illness:  Records reviewed, and patient examined. Consult Requested by: Dr. Manohar Cao  This is a 26-year-old gentleman with history of COPD who is been limited by progres Past Medical History:   Diagnosis Date   • Aortic stenosis    • Aortic valve replaced    • Congestive heart disease (HCC)    • Constrictive pericarditis     On 2/13/2018: re-do sternotomy and pericardectomy   • COPD (chronic obstructive pulmonary disease) [COMPLETED] MethylPREDNISolone Sodium Succ (Solu-MEDROL) 500 mg in sodium chloride 0.9 % 100 mL IVPB 500 mg Intravenous Once   amiodarone HCl (PACERONE) tab 400 mg 400 mg Oral Daily   ipratropium-albuterol (DUONEB) nebulizer solution 3 mL 3 mL Nebulization HYDROcodone-acetaminophen (NORCO)  MG per tab 1 tablet 1 tablet Oral Q4H PRN   Or      HYDROcodone-acetaminophen (NORCO)  MG per tab 2 tablet 2 tablet Oral Q4H PRN   morphINE sulfate (PF) 2 MG/ML injection 2 mg 2 mg Intravenous Q1H PRN   Or Q6H          Smoking status: Former Smoker  2.00 Packs/day  For 50.00 Years     Types: Cigarettes    Quit date: 12/31/2005    Smokeless tobacco: Never Used    Alcohol use Yes  8.4 oz/week    14 Glasses of wine per week         Comment: Red wine - about two Cardiovascular:  Heart with regular rate rhythm, no murmurs appreciated. Radial and pedal pulses good. No cyanosis in all extremities. Abdomen:   No tenderness guarding or rigidity. Liver and spleen are not enlarged. Bowel sounds present. rehabilitation, working with PT/OT to upgrade present functional status, provide family training, assess home equipment and assistive device needs.             24 hr rehab nursing also beneficial for medication management, pressure sore prevention, bowel an lungs  bilaterally with stable small bilateral pleural effusions. No pneumothorax. Degenerative changes in the spine. Stable hyperinflation. CONCLUSION:  Further decrease in pneumoperitoneum and pneumomediastinum.   Otherwise no  significant interva Normal size cardiac silhouette. MEDIASTINUM:  Sequelae of valve replacement is noted. Median sternotomy wires are midline. PLEURA:  Small bilateral pleural effusions are noted. No pneumothorax. BONES:  Normal for age.       CONCLUSION:  Small bilateral pl - Transthoracic Echocardiogram Name:Eliud Campbell Date: 2018 :  1940 Ht:  (73in)  BP: 111 / 65 MRN:  [id number]    Age:  77years    Wt:  (197lb) HR: 89bpm Loc:  [facility]        Gndr: M          BSA: 2.14m^2 Sonographer: Vicki BAIN Ordering: 12mm Hg. VTI ratio of LVOT to aortic valve: 0.46, normal    for valve type. Valve area (VTI): 1.76cm^2. Indexed valve area (VTI):    0.82cm^2/m^2. 3. Mitral valve: Mildly calcified annulus.  4. Left atrium: The left atrial volume was moderately to markedly regurgitation. VTI ratio of LVOT to aortic valve: 0.46, normal for valve type. Valve area (VTI): 1.76cm^2. Indexed valve area (VTI): 0.82cm^2/m^2. Peak velocity ratio of LVOT to aortic valve: 0.47. Valve area (Vmax): 1.77cm^2.  Indexed valve area (Vmax): 1.71              ---------  LV ejection fraction                            64    %           >=55  LV e', lateral                                  0.135 m/sec       ---------  LV E/e', lateral                                8                 -------- 12    mm Hg       ---------  Aortic peak gradient, S                         22    mm Hg       ---------  VTI ratio, LVOT/AV                              0.46              ---------  Aortic valve area, VTI                          1.76  cm^2 3.3   m/sec       ---------  Tricuspid peak RV-RA gradient                   43    mm Hg       ---------  Tricuspid maximal regurg velocity, PISA         2.87  m/sec       ---------   Systemic veins                                  Value Presenting Problem: s/p re-do sternotomy, pericardectomy 2/13/18    Problem List  Active Problems:    Peripheral vascular disease, unspecified ([address]    Chronic obstructive pulmonary disease (HCC)    Essential hypertension    Pure hypercholesterolemia    OS Comment: surgery for abdominal aortic aneurysm  1/30/2015: OTHER SURGICAL HISTORY      Comment: aortic valve replacement  No date: OTHER SURGICAL HISTORY      Comment: On 2/13/2018: re-do sternotomy and                pericardectomy  4/1/2016: Mando CHRISTIE AM-PAC Score:  Raw Score: 19   PT Approx Degree of Impairment Score: 41.77%   Standardized Score (AM-PAC Scale): 45.44   CMS Modifier (G-Code): CK    FUNCTIONAL ABILITY STATUS  Gait Assessment   Gait Assistance: Minimum assistance  Distance (ft): 112  As of session/findings; All patient questions and concerns addressed; Family present    ASSESSMENT   Pt demonstrated progress in gait distance this session. Pt continues to desaturate during gait and exercises.  Pt recalled 3/3 sternal precautions independently 3/21/2018 10:15 AM ARTHUR [facility]    3/22/2018 2:30 PM [address] Cardiopulmonary Rehabilitation [facility] AT [facility]

## (undated) NOTE — MR AVS SNAPSHOT
[facility], [address] [address] [id number]               Thank you for choosing us for your health care visit with Marshal Ordoñez MD.  We are glad to serve you and happy to provide you with this ocampo What changed:  Another medication with the same name was changed. Make sure you understand how and when to take each. Commonly known as:  [employer] CONTOUR NEXT TEST           betamethasone dipropionate 0.05 % Crea   2 (two) times daily as needed.  Apply spar your doctor or other care provider to review them with you.             Results of Recent Testing     ELECTROCARDIOGRAM, COMPLETE             MyChart     Visit MyChart  You can access your MyChart to more actively manage your health care and view more detai

## (undated) NOTE — Clinical Note
June 20, 2017        Kenia Ludwig  [address] [address]      Dear Jennie Spear:    I am the Nurse Care Manager with Dr Isa Staton office. Just reaching out to see how you are doing since your discharge home.    When you have a minute

## (undated) NOTE — Clinical Note
Pt does not have his HFU appointment scheduled. Pt was offered an appointment multiple times and pt declined each time. Benefits of HFU were discussed. Pt stated the is doing pretty good however the spouse that he is the same as before.  He is still SOB wit

## (undated) NOTE — LETTER
Myles Anthony M.D., F.A.C.S. Jeffrey Lamb M.D., F.A.C.S. Екатерина Gabriel M.D., Franny Corrales. LYNNE Godfrey M.D., F.A.C.S. Amber Tanner. Agustin Kim M.D., F.A.C.S. DANYELLE Bueno M. Birdena Barbone A.D. Gretta Mazzoni, M.D., F. To that end, on the following page we will ask you some questions to make certain that you understand everything which has been explained to you.  Included in this understanding is that there are both surgical and nonsurgical treatments available for you, t [facility] ([facility] surgeon has met with me and explained the matter of my illness, and what treatments might be available to improve my condition.  As a result of that conversation, I understand the following:    A [facility] surgeon met with The nature and options for treatment for my condition have been explained to me, in detail, by a [facility] surgeon and all questions have been answered to my satisfaction.  I understand that I am not required to undergo surgery, and further, that if I so desire,

## (undated) NOTE — MR AVS SNAPSHOT
After Visit Summary   9/25/2019    Amie Perez    MRN: [id number]           Visit Information     Date & Time  9/25/2019  1:30 PM Provider  Mehdi Gaming MD Department  [address] [place] [address] Dept.  Phone  [phone]      Allergies as o ipratropium-albuterol 0.5-2.5 (3) MG/3ML Inhalation Solution Take 3 mL by nebulization 4 (four) times daily. docusate sodium 100 MG Oral Cap Take 100 mg by mouth every morning.     Albuterol Sulfate (PROAIR RESPICLICK) 162 (90 Base) MCG/ACT Inhalation A illness or injury that does  not require immediate attention.           Average cost  $70*       VIDEO VISITS  Visit face-to-face with a [facility] physician or [facility]  using your mobile device or computer   using [employer]      e-VISITS  Communicate with a [facility] physici

## (undated) NOTE — LETTER
March 15, 2018    Lorne Kulkarni  [address] [address]    Dear Mirela Lima: It was a pleasure speaking with you over the phone recently.  To follow up, I wanted to send you some contact information to utilize when you have a question

## (undated) NOTE — IP AVS SNAPSHOT
Patient Demographics     Address  [place] [address]  [place] [address] Phone  [phone] (Home) *Preferred* E-mail Address  [email or web address]      Emergency Contact(s)     Name Relation Home Work [address] Spouse [phone] functioning and strong. Unless instructed not to exercise, you may walk at a slow to moderate pace for 10-15 minutes 2-3 days per week to start. Pace your activity to prevent shortness of breath or fatigue.  Stop exercise if you develop chest pain, lighthea Commonly known as:  AMARYL      Take 1 mg by mouth every evening. PROAIR  (90 Base) MCG/ACT Aers  Generic drug:  Albuterol Sulfate HFA      Inhale 2 puffs into the lungs every 6 (six) hours as needed for Wheezing or Shortness of Breath.    P [id number] ramipril (ALTACE) cap 2.5 mg 07/18/17 0858 Given      [id number] spironolactone (ALDACTONE) tab 25 mg 07/18/17 0858 Given            LEFT LOWER ABDOMEN     Order ID Medication Name Action Time Action Reason Comments    [id number] Heparin Sodium (P Specimen Information    Type Source Collected On   Blood — 07/18/17 0515          Components    Component Value Reference Range Flag Lab   Glucose 131 70 - 99 mg/dL [facility]   BUN 24 8 - 20 mg/dL [facility]   Creatinine 1.13 0.70 - 1.30 mg/dL — [facility] Body Fluid Culture Result No Growth 3 Days     Body Fld Smear Occasional Neutrophils seen      This is a cytocentrifuged smear.       No organisms seen         H&P - H&P Note      H&P signed by Georgie Cole MD at 7/7/2017  6:45 PM  Version 1 of 1 complication, not stated as uncontrolled    • Unspecified essential hypertension    • Unspecified intestinal obstruction (HCC)    • Visual impairment      Past Surgical History:  No date: ANGIOGRAM  1994: ARTHROSCOPY OF JOINT UNLISTED Left      Comment: navneet •  Umeclidinium Bromide (INCRUSE ELLIPTA) 62.5 MCG/INH inhaler 1 puff, 1 puff, Inhalation, Daily  •  [START ON 7/8/2017] glimepiride (AMARYL) tab, 2 mg, Oral, Daily with breakfast  •  glimepiride (AMARYL) tab 1 mg, 1 mg, Oral, QPM  •  furosemide (LASIX) 25 Diabetes mellitus type 2, controlled, with complications ([address]     Essential hypertension     Pure hypercholesterolemia     SEBLE on CPAP     S/P AVR (aortic valve replacement)     Perennial non-allergic rhinitis     Restrictive pericarditis     Aortic in history of COPD  Severe[TZ.1], hx SEBLE compliant and compensated[TZ.2]. ,HX PVD ,htn and DM. He underwent AVR bovine 1/30/15 after presenting with increasing dyspnea.   At that time it was noted that he had unexpected acute pericarditis as well requiring exte • Coronary atherosclerosis of unspecified type of vessel, native or graft    • Difficulty breathing    • High blood pressure    • High cholesterol    • Hypercholesterolemia    • Neuropathy (HCC)     hands, legs, feet   • Other and unspecified hyperlipidemi 2 (two) times daily. Disp:  Rfl: 4 7/7/2017 at Unknown time   bisacodyl 5 MG Oral Tab EC Take 5 mg by mouth daily.  Disp:  Rfl:  7/7/2017 at Unknown time   Tiotropium Bromide Monohydrate (SPIRIVA RESPIMAT) 2.5 MCG/ACT Inhalation Aero Soln Inhale 2.5 mcg int 07/13/17 0500 - 98 °F (36.7 °C) Oral 55 20 91 % 184 lb 4.9 oz (83.6 kg)   07/13/17 0350 - - - 90 - 96 % -   07/13/17 0000 - 97.6 °F (36.4 °C) Oral 91 20 97 % -   07/12/17 2123 116/36 (!) 97.3 °F (36.3 °C) Oral 59 22 96 % -   07/12/17 1633 106/52 (!) 97.4 ° COPD (chronic obstructive pulmonary disease) (HCC)     Diabetes mellitus type 2, controlled, with complications ([address]     Essential hypertension     Pure hypercholesterolemia     SEBLE on CPAP     S/P AVR (aortic valve replacement)     Perennial non-aller Electronically signed by Stephanie Joiner MD on 7/13/2017  8:07 PM   Attribution Sinclair    TZ. 1 - Stephanie Joiner MD on 7/13/2017  2:28 PM  TZ. 2 - Stephanie Joiner MD on 7/13/2017  7:00 PM  TZ. 3 - Stephanie Joiner MD on 7/13/2017  7:25 PM                     D/C and prosthetic valve noted. No pneumothorax. Biapical pleural-parenchymal scarring. CONCLUSION:  1. Stable chest radiograph compared to 6/17/17 including small to moderate bilateral pleural effusions with atelectasis/consolidation.     Dictated by: Eagle Given hands, legs, feet   • Other and unspecified hyperlipidemia    • Pericarditis    • Renal disorder     hx of nephrolithiasis (hx of multiple UTI's)   • Shortness of breath    • Sleep apnea     CPAP   • Type II or unspecified type diabetes mellitus without m 7 Very hard breathing   8    9 Very, Very Severe   10 MAX - You need to stop     Patient Education provided:    Use this scale to rate the difficulty of your breathing:  - As you would rate your pain from 0-10, you can rate your SOB from 0-10  - Goal is to complete sit>stand to RW with CGA and ambulate 50 feet back to room with Min A.   Pt completed stand>sit with SBA and sit>supine into bed with Mod I.    THERAPEUTIC EXERCISES  Lower Extremity Ankle pumps  Hip AB/AD  Quad sets  SLR  Hooklying bridging     Up Video Swallow Study Notes     No notes of this type exist for this encounter. SLP Notes     No notes of this type exist for this encounter.             Immunizations     Name Date      INFLUENZA 09/26/16     Pneumococcal (Prevnar 13) 09/26/16     Pneum

## (undated) NOTE — IP AVS SNAPSHOT
[address]            (For Outpatient Use Only) Initial Admit Date: 7/7/2017   Inpt/Obs Admit Date: Inpt: 7/7/17 / Obs: N/A   Discharge Date:    Hospital Acct:  [de-identified]   MRN: [de-identified]   CSN: [id number]        ENCOUNTER  Patient C Subscriber ID:  Pt Rel to Subscriber:    Hospital Account Financial Class: Medicare    July 18, 2017

## (undated) NOTE — IP AVS SNAPSHOT
[facility] [place] [address] [place], [address] ~ [phone]                Discharge Summary   3/20/2017    [address]  [facility]           Admission Information        Provider Department    3/20/2017 Elier Souza MD  2ne-A Instructions Authorizing Provider    Morning Afternoon Evening As Needed    torsemide 20 MG Tabs   Last time this was given:  20 mg on 3/22/2017  7:43 AM   Commonly known as:  DEMADEX   What changed:    - how much to take  - when to take this  - additiona Linagliptin 5 MG Tabs   Commonly known as:  TRADJENTA   Next dose due:  03/23/17 9am        Take 1 tablet by mouth daily.    Stop taking on:  3/29/2017    Day Link                           metolazone 2.5 MG Tabs   Commonly known as Please  your prescriptions at the location directed by your doctor or nurse     Bring a paper prescription for each of these medications    - Doxycycline Hyclate 100 MG Caps  - torsemide 20 MG Tabs  - torsemide 20 MG Tabs              Patient Instru Recent Hematology Lab Results  (Last 3 results in the past 90 days)    WBC RBC Hemoglobin Hematocrit MCV MCH MCHC RDW Platelet MPV    (39/72/31)  5.0 (03/20/17)  4.64 (03/20/17)  13.0 (03/20/17)  40.0 (03/20/17)  86.2 -- -- -- (03/20/17)  158.0 --    (02/2 Patient [address] to help you get signed up for insurance coverage. Patient [address] is a Federal Navigator program that can help with your Affordable Care Act coverage, as well as all types of Medicaid plans.   To get signed up and covere diarrhea           Blood Pressure and Cardiac Medications     metoprolol Tartrate 25 MG Oral Tab    ramipril 10 MG Oral Cap         Use: Treat abnormal blood pressure (high or low), cardiac conditions; and/or abnormal heart rates/rhythms   Most common side Most common side effects: Stomach upset   What to report to your healthcare team: Stomach upset, unresolved pain           GI Medications     bisacodyl 5 MG Oral Tab EC       Use:  Nausea/vomiting, acid reflux, low bowel motility, stomach pain   Most commo